# Patient Record
Sex: FEMALE | Race: WHITE | Employment: FULL TIME | URBAN - NONMETROPOLITAN AREA
[De-identification: names, ages, dates, MRNs, and addresses within clinical notes are randomized per-mention and may not be internally consistent; named-entity substitution may affect disease eponyms.]

---

## 2017-01-05 ENCOUNTER — OFFICE VISIT (OUTPATIENT)
Dept: FAMILY MEDICINE CLINIC | Age: 52
End: 2017-01-05
Payer: COMMERCIAL

## 2017-01-05 VITALS
BODY MASS INDEX: 36.32 KG/M2 | HEART RATE: 97 BPM | DIASTOLIC BLOOD PRESSURE: 76 MMHG | HEIGHT: 65 IN | SYSTOLIC BLOOD PRESSURE: 124 MMHG | WEIGHT: 218 LBS | TEMPERATURE: 97.6 F | OXYGEN SATURATION: 98 %

## 2017-01-05 DIAGNOSIS — J06.9 ACUTE URI: ICD-10-CM

## 2017-01-05 DIAGNOSIS — R30.0 DYSURIA: Primary | ICD-10-CM

## 2017-01-05 DIAGNOSIS — R30.0 DYSURIA: ICD-10-CM

## 2017-01-05 LAB
AMORPHOUS: ABNORMAL /HPF
BACTERIA: ABNORMAL /HPF
BILIRUBIN URINE: ABNORMAL
BLOOD, URINE: ABNORMAL
CLARITY: CLEAR
COLOR: ABNORMAL
GLUCOSE URINE: NEGATIVE MG/DL
KETONES, URINE: NEGATIVE MG/DL
LEUKOCYTE ESTERASE, URINE: NEGATIVE
NITRITE, URINE: NEGATIVE
PH UA: 5.5
PROTEIN UA: 30 MG/DL
SPECIFIC GRAVITY UA: >=1.03
URINE TYPE: ABNORMAL
UROBILINOGEN, URINE: 0.2 E.U./DL
WBC UA: ABNORMAL /HPF (ref 0–5)

## 2017-01-05 PROCEDURE — 96372 THER/PROPH/DIAG INJ SC/IM: CPT | Performed by: NURSE PRACTITIONER

## 2017-01-05 PROCEDURE — 99214 OFFICE O/P EST MOD 30 MIN: CPT | Performed by: NURSE PRACTITIONER

## 2017-01-05 RX ORDER — DEXAMETHASONE SODIUM PHOSPHATE 4 MG/ML
4 INJECTION, SOLUTION INTRA-ARTICULAR; INTRALESIONAL; INTRAMUSCULAR; INTRAVENOUS; SOFT TISSUE ONCE
Status: COMPLETED | OUTPATIENT
Start: 2017-01-05 | End: 2017-01-05

## 2017-01-05 RX ORDER — TRIAMCINOLONE ACETONIDE 40 MG/ML
40 INJECTION, SUSPENSION INTRA-ARTICULAR; INTRAMUSCULAR ONCE
Status: COMPLETED | OUTPATIENT
Start: 2017-01-05 | End: 2017-01-05

## 2017-01-05 RX ORDER — CEFTRIAXONE 1 G/1
1 INJECTION, POWDER, FOR SOLUTION INTRAMUSCULAR; INTRAVENOUS ONCE
Status: COMPLETED | OUTPATIENT
Start: 2017-01-05 | End: 2017-01-05

## 2017-01-05 RX ADMIN — CEFTRIAXONE 1 G: 1 INJECTION, POWDER, FOR SOLUTION INTRAMUSCULAR; INTRAVENOUS at 16:03

## 2017-01-05 RX ADMIN — TRIAMCINOLONE ACETONIDE 40 MG: 40 INJECTION, SUSPENSION INTRA-ARTICULAR; INTRAMUSCULAR at 16:04

## 2017-01-05 RX ADMIN — DEXAMETHASONE SODIUM PHOSPHATE 4 MG: 4 INJECTION, SOLUTION INTRA-ARTICULAR; INTRALESIONAL; INTRAMUSCULAR; INTRAVENOUS; SOFT TISSUE at 16:04

## 2017-01-05 ASSESSMENT — ENCOUNTER SYMPTOMS
SHORTNESS OF BREATH: 1
SINUS PRESSURE: 1
RHINORRHEA: 1
COUGH: 1

## 2017-07-27 RX ORDER — ESOMEPRAZOLE MAGNESIUM 40 MG/1
CAPSULE, DELAYED RELEASE ORAL
Qty: 30 CAPSULE | Refills: 0 | Status: SHIPPED | OUTPATIENT
Start: 2017-07-27 | End: 2017-09-04 | Stop reason: SDUPTHER

## 2017-09-05 RX ORDER — ESOMEPRAZOLE MAGNESIUM 40 MG/1
CAPSULE, DELAYED RELEASE ORAL
Qty: 30 CAPSULE | Refills: 0 | Status: SHIPPED | OUTPATIENT
Start: 2017-09-05 | End: 2017-12-01 | Stop reason: SDUPTHER

## 2017-10-22 ENCOUNTER — APPOINTMENT (OUTPATIENT)
Dept: GENERAL RADIOLOGY | Age: 52
DRG: 313 | End: 2017-10-22
Payer: COMMERCIAL

## 2017-10-22 ENCOUNTER — HOSPITAL ENCOUNTER (INPATIENT)
Age: 52
LOS: 1 days | Discharge: HOME | DRG: 313 | End: 2017-10-23
Attending: EMERGENCY MEDICINE | Admitting: INTERNAL MEDICINE
Payer: COMMERCIAL

## 2017-10-22 DIAGNOSIS — R07.89 CHEST DISCOMFORT: Primary | ICD-10-CM

## 2017-10-22 LAB
ALBUMIN SERPL-MCNC: 4.1 G/DL (ref 3.5–5.2)
ALP BLD-CCNC: 79 U/L (ref 35–104)
ALT SERPL-CCNC: 17 U/L (ref 5–33)
ANION GAP SERPL CALCULATED.3IONS-SCNC: 13 MMOL/L (ref 7–19)
AST SERPL-CCNC: 17 U/L (ref 5–32)
BASOPHILS ABSOLUTE: 0.1 K/UL (ref 0–0.2)
BASOPHILS RELATIVE PERCENT: 1.2 % (ref 0–1)
BILIRUB SERPL-MCNC: <0.2 MG/DL (ref 0.2–1.2)
BUN BLDV-MCNC: 13 MG/DL (ref 6–20)
C-REACTIVE PROTEIN: 0.49 MG/DL (ref 0–0.5)
CALCIUM SERPL-MCNC: 9.4 MG/DL (ref 8.6–10)
CHLORIDE BLD-SCNC: 102 MMOL/L (ref 98–111)
CO2: 25 MMOL/L (ref 22–29)
CREAT SERPL-MCNC: 1 MG/DL (ref 0.5–0.9)
EOSINOPHILS ABSOLUTE: 0.1 K/UL (ref 0–0.6)
EOSINOPHILS RELATIVE PERCENT: 2.1 % (ref 0–5)
GFR NON-AFRICAN AMERICAN: 59
GLUCOSE BLD-MCNC: 109 MG/DL (ref 74–109)
HCT VFR BLD CALC: 39 % (ref 37–47)
HEMOGLOBIN: 12.6 G/DL (ref 12–16)
LYMPHOCYTES ABSOLUTE: 2.3 K/UL (ref 1.1–4.5)
LYMPHOCYTES RELATIVE PERCENT: 40.1 % (ref 20–40)
MCH RBC QN AUTO: 29.9 PG (ref 27–31)
MCHC RBC AUTO-ENTMCNC: 32.3 G/DL (ref 33–37)
MCV RBC AUTO: 92.6 FL (ref 81–99)
MONOCYTES ABSOLUTE: 0.4 K/UL (ref 0–0.9)
MONOCYTES RELATIVE PERCENT: 7.3 % (ref 0–10)
NEUTROPHILS ABSOLUTE: 2.7 K/UL (ref 1.5–7.5)
NEUTROPHILS RELATIVE PERCENT: 48.9 % (ref 50–65)
PDW BLD-RTO: 13.6 % (ref 11.5–14.5)
PERFORMED ON: NORMAL
PERFORMED ON: NORMAL
PLATELET # BLD: 297 K/UL (ref 130–400)
PLATELET SLIDE REVIEW: ABNORMAL
PMV BLD AUTO: 10.5 FL (ref 9.4–12.3)
POC TROPONIN I: 0 NG/ML (ref 0–0.08)
POC TROPONIN I: 0 NG/ML (ref 0–0.08)
POTASSIUM SERPL-SCNC: 4.3 MMOL/L (ref 3.5–5)
RBC # BLD: 4.21 M/UL (ref 4.2–5.4)
SEDIMENTATION RATE, ERYTHROCYTE: 22 MM/HR (ref 0–25)
SODIUM BLD-SCNC: 140 MMOL/L (ref 136–145)
TOTAL PROTEIN: 7.2 G/DL (ref 6.6–8.7)
TROPONIN: <0.01 NG/ML (ref 0–0.03)
WBC # BLD: 5.6 K/UL (ref 4.8–10.8)

## 2017-10-22 PROCEDURE — 99223 1ST HOSP IP/OBS HIGH 75: CPT | Performed by: INTERNAL MEDICINE

## 2017-10-22 PROCEDURE — 85025 COMPLETE CBC W/AUTO DIFF WBC: CPT

## 2017-10-22 PROCEDURE — 86140 C-REACTIVE PROTEIN: CPT

## 2017-10-22 PROCEDURE — 36415 COLL VENOUS BLD VENIPUNCTURE: CPT

## 2017-10-22 PROCEDURE — 2140000000 HC CCU INTERMEDIATE R&B

## 2017-10-22 PROCEDURE — 99285 EMERGENCY DEPT VISIT HI MDM: CPT

## 2017-10-22 PROCEDURE — 6370000000 HC RX 637 (ALT 250 FOR IP): Performed by: EMERGENCY MEDICINE

## 2017-10-22 PROCEDURE — 85652 RBC SED RATE AUTOMATED: CPT

## 2017-10-22 PROCEDURE — 71010 XR CHEST PORTABLE: CPT

## 2017-10-22 PROCEDURE — 93005 ELECTROCARDIOGRAM TRACING: CPT

## 2017-10-22 PROCEDURE — 99284 EMERGENCY DEPT VISIT MOD MDM: CPT | Performed by: EMERGENCY MEDICINE

## 2017-10-22 PROCEDURE — 6370000000 HC RX 637 (ALT 250 FOR IP): Performed by: INTERNAL MEDICINE

## 2017-10-22 PROCEDURE — 6360000002 HC RX W HCPCS: Performed by: INTERNAL MEDICINE

## 2017-10-22 PROCEDURE — 80053 COMPREHEN METABOLIC PANEL: CPT

## 2017-10-22 PROCEDURE — 2580000003 HC RX 258: Performed by: INTERNAL MEDICINE

## 2017-10-22 PROCEDURE — 84484 ASSAY OF TROPONIN QUANT: CPT

## 2017-10-22 RX ORDER — LISINOPRIL AND HYDROCHLOROTHIAZIDE 12.5; 1 MG/1; MG/1
1 TABLET ORAL DAILY
Status: DISCONTINUED | OUTPATIENT
Start: 2017-10-23 | End: 2017-10-22 | Stop reason: CLARIF

## 2017-10-22 RX ORDER — ACETAMINOPHEN 325 MG/1
650 TABLET ORAL EVERY 4 HOURS PRN
Status: DISCONTINUED | OUTPATIENT
Start: 2017-10-22 | End: 2017-10-23 | Stop reason: HOSPADM

## 2017-10-22 RX ORDER — FAMOTIDINE 20 MG/1
40 TABLET, FILM COATED ORAL 2 TIMES DAILY
Status: DISCONTINUED | OUTPATIENT
Start: 2017-10-22 | End: 2017-10-23 | Stop reason: HOSPADM

## 2017-10-22 RX ORDER — SODIUM CHLORIDE 0.9 % (FLUSH) 0.9 %
10 SYRINGE (ML) INJECTION EVERY 12 HOURS SCHEDULED
Status: DISCONTINUED | OUTPATIENT
Start: 2017-10-22 | End: 2017-10-23 | Stop reason: HOSPADM

## 2017-10-22 RX ORDER — ASPIRIN 81 MG/1
81 TABLET, CHEWABLE ORAL DAILY
Status: DISCONTINUED | OUTPATIENT
Start: 2017-10-23 | End: 2017-10-23 | Stop reason: HOSPADM

## 2017-10-22 RX ORDER — LISINOPRIL 10 MG/1
10 TABLET ORAL DAILY
Status: DISCONTINUED | OUTPATIENT
Start: 2017-10-23 | End: 2017-10-23 | Stop reason: HOSPADM

## 2017-10-22 RX ORDER — SODIUM CHLORIDE 0.9 % (FLUSH) 0.9 %
10 SYRINGE (ML) INJECTION PRN
Status: DISCONTINUED | OUTPATIENT
Start: 2017-10-22 | End: 2017-10-23 | Stop reason: HOSPADM

## 2017-10-22 RX ORDER — HYDROCHLOROTHIAZIDE 12.5 MG/1
12.5 CAPSULE, GELATIN COATED ORAL DAILY
Status: DISCONTINUED | OUTPATIENT
Start: 2017-10-23 | End: 2017-10-23 | Stop reason: HOSPADM

## 2017-10-22 RX ORDER — PANTOPRAZOLE SODIUM 40 MG/1
40 TABLET, DELAYED RELEASE ORAL
Status: DISCONTINUED | OUTPATIENT
Start: 2017-10-23 | End: 2017-10-23 | Stop reason: HOSPADM

## 2017-10-22 RX ORDER — ONDANSETRON 2 MG/ML
4 INJECTION INTRAMUSCULAR; INTRAVENOUS EVERY 6 HOURS PRN
Status: DISCONTINUED | OUTPATIENT
Start: 2017-10-22 | End: 2017-10-23 | Stop reason: HOSPADM

## 2017-10-22 RX ORDER — ASPIRIN 81 MG/1
324 TABLET, CHEWABLE ORAL ONCE
Status: COMPLETED | OUTPATIENT
Start: 2017-10-22 | End: 2017-10-22

## 2017-10-22 RX ORDER — NITROGLYCERIN 0.4 MG/1
0.4 TABLET SUBLINGUAL EVERY 5 MIN PRN
Status: DISCONTINUED | OUTPATIENT
Start: 2017-10-22 | End: 2017-10-23 | Stop reason: HOSPADM

## 2017-10-22 RX ORDER — ESOMEPRAZOLE MAGNESIUM 40 MG/1
40 CAPSULE, DELAYED RELEASE ORAL
Status: DISCONTINUED | OUTPATIENT
Start: 2017-10-23 | End: 2017-10-22 | Stop reason: CLARIF

## 2017-10-22 RX ORDER — BUTALBITAL, ACETAMINOPHEN AND CAFFEINE 50; 325; 40 MG/1; MG/1; MG/1
1 TABLET ORAL EVERY 4 HOURS PRN
Status: DISCONTINUED | OUTPATIENT
Start: 2017-10-22 | End: 2017-10-23 | Stop reason: HOSPADM

## 2017-10-22 RX ADMIN — ENOXAPARIN SODIUM 40 MG: 40 INJECTION SUBCUTANEOUS at 21:20

## 2017-10-22 RX ADMIN — ACETAMINOPHEN 650 MG: 325 TABLET, FILM COATED ORAL at 21:20

## 2017-10-22 RX ADMIN — NITROGLYCERIN 0.4 MG: 0.4 TABLET SUBLINGUAL at 18:25

## 2017-10-22 RX ADMIN — Medication 10 ML: at 21:20

## 2017-10-22 RX ADMIN — NITROGLYCERIN 0.4 MG: 0.4 TABLET SUBLINGUAL at 18:15

## 2017-10-22 RX ADMIN — ASPIRIN 81 MG CHEWABLE TABLET 324 MG: 81 TABLET CHEWABLE at 18:15

## 2017-10-22 RX ADMIN — FAMOTIDINE 40 MG: 20 TABLET ORAL at 21:52

## 2017-10-22 ASSESSMENT — PAIN DESCRIPTION - PAIN TYPE: TYPE: ACUTE PAIN

## 2017-10-22 ASSESSMENT — ENCOUNTER SYMPTOMS
BACK PAIN: 1
COUGH: 0
VOMITING: 0
NAUSEA: 1
SHORTNESS OF BREATH: 1

## 2017-10-22 ASSESSMENT — PAIN SCALES - GENERAL
PAINLEVEL_OUTOF10: 2
PAINLEVEL_OUTOF10: 7
PAINLEVEL_OUTOF10: 7

## 2017-10-22 ASSESSMENT — PAIN DESCRIPTION - ORIENTATION: ORIENTATION: LEFT

## 2017-10-22 ASSESSMENT — PAIN DESCRIPTION - DESCRIPTORS: DESCRIPTORS: SHARP;PRESSURE

## 2017-10-22 ASSESSMENT — PAIN DESCRIPTION - LOCATION: LOCATION: CHEST

## 2017-10-22 ASSESSMENT — PAIN DESCRIPTION - FREQUENCY: FREQUENCY: INTERMITTENT

## 2017-10-22 ASSESSMENT — PAIN DESCRIPTION - DIRECTION: RADIATING_TOWARDS: THROUGH TO BACK

## 2017-10-22 NOTE — ED PROVIDER NOTES
children: N/A    Years of education: N/A     Social History Main Topics    Smoking status: Never Smoker    Smokeless tobacco: None    Alcohol use None    Drug use: Unknown    Sexual activity: Not Asked     Other Topics Concern    None     Social History Narrative    None       SCREENINGS    [place] Coma Scale  Eye Opening: Spontaneous  Best Verbal Response: Oriented  Best Motor Response: Obeys commands  Abdirahman Coma Scale Score: 15        PHYSICAL EXAM    (up to 7 for level 4, 8 or more for level 5)     ED Triage Vitals [10/22/17 1614]   BP Temp Temp Source Pulse Resp SpO2 Height Weight   (!) 177/101 98.1 °F (36.7 °C) Tympanic 104 22 100 % 5' 5\" (1.651 m) 220 lb (99.8 kg)       Physical Exam   Constitutional: She is oriented to person, place, and time. She appears well-developed and well-nourished. No distress. HENT:   Mouth/Throat: Oropharynx is clear and moist.   Neck: Normal range of motion. Neck supple. Cardiovascular: Normal rate, regular rhythm and normal heart sounds. Pulmonary/Chest: Effort normal and breath sounds normal.   Abdominal: Soft. There is no tenderness. Musculoskeletal: She exhibits no edema. Neurological: She is alert and oriented to person, place, and time. Skin: Skin is warm and dry. She is not diaphoretic. Psychiatric: She has a normal mood and affect. Nursing note and vitals reviewed.       DIAGNOSTIC RESULTS     EKG: All EKG's are interpreted by the Emergency Department Physician who either signs or Co-signs this chart in the absence of a cardiologist.    EKG 1: sinus tach, , LVH changes, no ST elevation or depression  EKG 2: no sig change except not tachy    RADIOLOGY:   Non-plain film images such as CT, Ultrasound and MRI are read by the radiologist. Plain radiographic images are visualized and preliminarily interpreted by the emergency physician with the below findings:        Interpretation per the Radiologist below, if available at the time of this MDM  Number of Diagnoses or Management Options  Diagnosis management comments: Improved after NTG. Discussed with Dr. Bhupendra landin in ED. Admits. CRITICAL CARE TIME   Total Critical Care time was 0 minutes, excluding separately reportable procedures. There was a high probability of clinically significant/life threatening deterioration in the patient's condition which required my urgent intervention. CONSULTS:  None    PROCEDURES:  Unless otherwise noted below, none     Procedures    FINAL IMPRESSION      1.  Chest discomfort          DISPOSITION/PLAN   DISPOSITION     PATIENT REFERRED TO:  Lesley San MD  [address] [place] [address]  [phone]            DISCHARGE MEDICATIONS:  Current Discharge Medication List             (Please note that portions of this note were completed with a voice recognition program.  Efforts were made to edit the dictations but occasionally words are mis-transcribed.)    Marilou Reza MD (electronically signed)  Attending Emergency Physician        Marilou Reza MD  10/23/17 [address] [place] MD  10/23/17 2535

## 2017-10-22 NOTE — ED NOTES
Repeat troponin running. Patient resting quietly with no s/s of distress.      [address] [facility]  10/22/17 1271

## 2017-10-23 ENCOUNTER — APPOINTMENT (OUTPATIENT)
Dept: NUCLEAR MEDICINE | Age: 52
DRG: 313 | End: 2017-10-23
Payer: COMMERCIAL

## 2017-10-23 VITALS
DIASTOLIC BLOOD PRESSURE: 87 MMHG | SYSTOLIC BLOOD PRESSURE: 155 MMHG | BODY MASS INDEX: 37.75 KG/M2 | RESPIRATION RATE: 16 BRPM | OXYGEN SATURATION: 99 % | HEIGHT: 65 IN | HEART RATE: 72 BPM | WEIGHT: 226.6 LBS | TEMPERATURE: 97.4 F

## 2017-10-23 LAB
ALBUMIN SERPL-MCNC: 3.4 G/DL (ref 3.5–5.2)
ALP BLD-CCNC: 63 U/L (ref 35–104)
ALT SERPL-CCNC: 13 U/L (ref 5–33)
ANION GAP SERPL CALCULATED.3IONS-SCNC: 11 MMOL/L (ref 7–19)
AST SERPL-CCNC: 13 U/L (ref 5–32)
BASOPHILS ABSOLUTE: 0.1 K/UL (ref 0–0.2)
BASOPHILS RELATIVE PERCENT: 1.1 % (ref 0–1)
BILIRUB SERPL-MCNC: <0.2 MG/DL (ref 0.2–1.2)
BUN BLDV-MCNC: 13 MG/DL (ref 6–20)
CALCIUM SERPL-MCNC: 8.9 MG/DL (ref 8.6–10)
CHLORIDE BLD-SCNC: 104 MMOL/L (ref 98–111)
CHOLESTEROL, TOTAL: 227 MG/DL (ref 160–199)
CO2: 27 MMOL/L (ref 22–29)
CREAT SERPL-MCNC: 1 MG/DL (ref 0.5–0.9)
EOSINOPHILS ABSOLUTE: 0.2 K/UL (ref 0–0.6)
EOSINOPHILS RELATIVE PERCENT: 2.1 % (ref 0–5)
GFR NON-AFRICAN AMERICAN: 59
GLUCOSE BLD-MCNC: 100 MG/DL (ref 74–109)
HCT VFR BLD CALC: 34.8 % (ref 37–47)
HDLC SERPL-MCNC: 42 MG/DL (ref 65–121)
HEMOGLOBIN: 11.5 G/DL (ref 12–16)
INR BLD: 1.07 (ref 0.88–1.18)
LDL CHOLESTEROL CALCULATED: 118 MG/DL
LV EF: 66 %
LVEF MODALITY: NORMAL
LYMPHOCYTES ABSOLUTE: 3.2 K/UL (ref 1.1–4.5)
LYMPHOCYTES RELATIVE PERCENT: 45.5 % (ref 20–40)
MCH RBC QN AUTO: 30.2 PG (ref 27–31)
MCHC RBC AUTO-ENTMCNC: 33 G/DL (ref 33–37)
MCV RBC AUTO: 91.3 FL (ref 81–99)
MONOCYTES ABSOLUTE: 0.7 K/UL (ref 0–0.9)
MONOCYTES RELATIVE PERCENT: 9.9 % (ref 0–10)
NEUTROPHILS ABSOLUTE: 2.9 K/UL (ref 1.5–7.5)
NEUTROPHILS RELATIVE PERCENT: 41.1 % (ref 50–65)
PDW BLD-RTO: 13.7 % (ref 11.5–14.5)
PLATELET # BLD: 256 K/UL (ref 130–400)
PMV BLD AUTO: 9.4 FL (ref 9.4–12.3)
POTASSIUM SERPL-SCNC: 4.1 MMOL/L (ref 3.5–5)
PROTHROMBIN TIME: 13.8 SEC (ref 12–14.6)
RBC # BLD: 3.81 M/UL (ref 4.2–5.4)
SODIUM BLD-SCNC: 142 MMOL/L (ref 136–145)
TOTAL PROTEIN: 6.5 G/DL (ref 6.6–8.7)
TRIGL SERPL-MCNC: 333 MG/DL (ref 0–149)
TROPONIN: <0.01 NG/ML (ref 0–0.03)
TROPONIN: <0.01 NG/ML (ref 0–0.03)
WBC # BLD: 7.1 K/UL (ref 4.8–10.8)

## 2017-10-23 PROCEDURE — 3430000000 HC RX DIAGNOSTIC RADIOPHARMACEUTICAL: Performed by: INTERNAL MEDICINE

## 2017-10-23 PROCEDURE — 78452 HT MUSCLE IMAGE SPECT MULT: CPT

## 2017-10-23 PROCEDURE — 85025 COMPLETE CBC W/AUTO DIFF WBC: CPT

## 2017-10-23 PROCEDURE — 6360000002 HC RX W HCPCS: Performed by: INTERNAL MEDICINE

## 2017-10-23 PROCEDURE — 85610 PROTHROMBIN TIME: CPT

## 2017-10-23 PROCEDURE — 84484 ASSAY OF TROPONIN QUANT: CPT

## 2017-10-23 PROCEDURE — 36415 COLL VENOUS BLD VENIPUNCTURE: CPT

## 2017-10-23 PROCEDURE — A9500 TC99M SESTAMIBI: HCPCS | Performed by: INTERNAL MEDICINE

## 2017-10-23 PROCEDURE — 99238 HOSP IP/OBS DSCHRG MGMT 30/<: CPT | Performed by: INTERNAL MEDICINE

## 2017-10-23 PROCEDURE — 80061 LIPID PANEL: CPT

## 2017-10-23 PROCEDURE — 93017 CV STRESS TEST TRACING ONLY: CPT

## 2017-10-23 PROCEDURE — 2580000003 HC RX 258: Performed by: INTERNAL MEDICINE

## 2017-10-23 PROCEDURE — 80053 COMPREHEN METABOLIC PANEL: CPT

## 2017-10-23 PROCEDURE — 6370000000 HC RX 637 (ALT 250 FOR IP): Performed by: INTERNAL MEDICINE

## 2017-10-23 RX ADMIN — FAMOTIDINE 40 MG: 20 TABLET ORAL at 10:24

## 2017-10-23 RX ADMIN — Medication 10 ML: at 10:25

## 2017-10-23 RX ADMIN — HYDROCHLOROTHIAZIDE 12.5 MG: 12.5 CAPSULE ORAL at 10:32

## 2017-10-23 RX ADMIN — REGADENOSON 0.4 MG: 0.08 INJECTION, SOLUTION INTRAVENOUS at 07:00

## 2017-10-23 RX ADMIN — TETRAKIS(2-METHOXYISOBUTYLISOCYANIDE)COPPER(I) TETRAFLUOROBORATE 30 MILLICURIE: 1 INJECTION, POWDER, LYOPHILIZED, FOR SOLUTION INTRAVENOUS at 10:18

## 2017-10-23 RX ADMIN — TETRAKIS(2-METHOXYISOBUTYLISOCYANIDE)COPPER(I) TETRAFLUOROBORATE 10 MILLICURIE: 1 INJECTION, POWDER, LYOPHILIZED, FOR SOLUTION INTRAVENOUS at 10:18

## 2017-10-23 RX ADMIN — ASPIRIN 81 MG CHEWABLE TABLET 81 MG: 81 TABLET CHEWABLE at 10:25

## 2017-10-23 RX ADMIN — ACETAMINOPHEN 650 MG: 325 TABLET, FILM COATED ORAL at 10:25

## 2017-10-23 RX ADMIN — LISINOPRIL 10 MG: 10 TABLET ORAL at 10:25

## 2017-10-23 ASSESSMENT — PAIN SCALES - GENERAL: PAINLEVEL_OUTOF10: 5

## 2017-10-23 NOTE — DISCHARGE SUMMARY
[facility]    Discharge Summary        Patient ID: Ventura Mir      Patient's PCP: Chau Cannon MD    Admit Date: 10/22/2017     Discharge Date:  10/23/17     Admitting Physician:  Edy Burden MD      Discharge Physician: Edy Burdne MD     Discharge Diagnoses:    1. Chest discomfort of ? Etiology, doubt myocardial ischemia    lexiscan / cardiolyte, 10/23/17  negative for myocardial ischemia, EF 66  %    2. Coronary artery disease    10/14/2014  SE Positive for clinical myocardial ischemia  10/14/2017  Cath  Moderate diffuse disease in mid and distal LAD, normal LVFX  10/23/17 lexiscan negative for myocardial ischemia, EF 66  %     3. Pereyra esophagus      Cardiac Specific Diagnoses:    Specialty Problems     None            The patient was seen and examined on day of discharge and this discharge summary is in conjunction with any daily progress note from day of discharge. History of Present Illness:     Ventura Mir is a 50 y.o. female who presents to [facility] ED with symptoms / signs / problem or diagnosis of chest discomfort. It is sharp and has been going on since Friday. The symptoms began several days ago. The chest discomfort began at rest and lasted less than a minute. It was not worse with activity. There was not partial relief with rest.  The chest discomfort was not described as pressure, fullness and tightness. It was not crushing. It was it located in the central chest with radiation to the back, throat and left shoulder. It was described as mild. There were no associated manifestations such as nausea, vomiting, diaphoresis, presyncope, syncope, dizzyness, weakness, cold sweats, or shortness of air. Hospital Course:     After admission, there was no enzymatic or electrocardiographic evidence of myocardial ischemia.       The patient had a [place] Kemi / cardiolyte, 10/23/17   which was grossly negative for myocardial ischemia by imaging criteria. This was a most reassuring report and strongly suggested that the patient's chest discomfort was of low probably of representing myocardial ischemia. The rest of the patient's hospitalization was uneventful. She was discharged home on medical therapy with outpatient follow up. Consults:     None      Significant Diagnostic Studies:    1. lexiscan / cardiolyte, 10/23/17    2. Significant Therapeutic Endeavors:      1. none      Activity: activity as directed per discharge instructions. Diet:  Cardiac diet    Labs:  For convenience and continuity at follow-up the following most recent labs are provided:    CBC:    Lab Results   Component Value Date    WBC 7.1 10/23/2017    HGB 11.5 10/23/2017    HCT 34.8 10/23/2017     10/23/2017       Renal:    Lab Results   Component Value Date     10/23/2017    K 4.1 10/23/2017     10/23/2017    CO2 27 10/23/2017    BUN 13 10/23/2017    CREATININE 1.0 10/23/2017    CALCIUM 8.9 10/23/2017         Discharge Medications:     Current Discharge Medication List           Details   esomeprazole (NEXIUM) 40 MG delayed release capsule TAKE ONE CAPSULE BY MOUTH EVERY MORNING BEFORE BREAKFAST  Qty: 30 capsule, Refills: 0      CPAP Machine MISC by Does not apply route  Qty: 1 each, Refills: 0      lisinopril-hydrochlorothiazide (PRINZIDE;ZESTORETIC) 10-12.5 MG per tablet Take 1 tablet by mouth daily  Qty: 30 tablet, Refills: 5    Associated Diagnoses: Essential hypertension      butalbital-acetaminophen-caffeine (FIORICET, ESGIC) -40 MG per tablet Take 1 tablet by mouth every 4 hours as needed for Headaches  Qty: 60 tablet, Refills: 0    Associated Diagnoses: History of headache      ranitidine (ZANTAC) 300 MG tablet Take 300 mg by mouth 2 times daily      Cetirizine HCl (ZYRTEC ALLERGY) 10 MG CAPS Take 1 capsule by mouth daily  Qty: 30 capsule, Refills: 0    Associated Diagnoses: Acute URI      celecoxib (CELEBREX) 200 MG capsule Take 1

## 2017-10-23 NOTE — H&P
Surgical History:   Procedure Laterality Date    CARDIAC CATHETERIZATION  10/14/13  JDT    EF 50%    CHOLECYSTECTOMY      HYSTERECTOMY      TONSILLECTOMY           Home Medications:   Prior to Admission medications    Medication Sig Start Date End Date Taking? Authorizing Provider   esomeprazole (NEXIUM) 40 MG delayed release capsule TAKE ONE CAPSULE BY MOUTH EVERY MORNING BEFORE BREAKFAST 9/5/17   MICHELE Laura   Cetirizine HCl (ZYRTEC ALLERGY) 10 MG CAPS Take 1 capsule by mouth daily 1/5/17   MICHELE Lerner   CPAP Machine MISC by Does not apply route 10/1/16   MICHELE Bello   celecoxib (CELEBREX) 200 MG capsule Take 1 capsule by mouth daily 9/27/16   MICHELE Bello   lisinopril-hydrochlorothiazide (PRINZIDE;ZESTORETIC) 10-12.5 MG per tablet Take 1 tablet by mouth daily 9/1/16   MICHELE Bello   butalbital-acetaminophen-caffeine (FIORICET, ESGIC) -04 MG per tablet Take 1 tablet by mouth every 4 hours as needed for Headaches 9/1/16   MICHELE Bello   estradiol (ESTRACE) 1 MG tablet Take 1 tablet by mouth daily 9/1/16   MICHELE Bello   tiZANidine (ZANAFLEX) 4 MG tablet Take 1 tablet by mouth every 8 hours as needed (muscle spasms) 9/1/16   MICHELE Bello   ranitidine (ZANTAC) 300 MG tablet Take 300 mg by mouth 2 times daily    Historical Provider, MD        Facility Administered Medications: Allergies:  Morphine     Social History:       Social History     Social History    Marital status:      Spouse name: N/A    Number of children: N/A    Years of education: N/A     Occupational History    Not on file. Social History Main Topics    Smoking status: Never Smoker    Smokeless tobacco: Not on file    Alcohol use Not on file    Drug use: Unknown    Sexual activity: Not on file     Other Topics Concern    Not on file     Social History Narrative    No narrative on file       Family History:   History reviewed.  No pertinent family history. REVIEW OF SYSTEMS:     Except as noted in the HPI, all other systems are negative        PHYSICAL EXAMINATION:     /66   Pulse 75   Temp 98.1 °F (36.7 °C) (Tympanic)   Resp 22   Ht 5' 5\" (1.651 m)   Wt 220 lb (99.8 kg)   SpO2 93%   BMI 36.61 kg/m²     GENERAL - well developed and well nourished, in no amount of generalized distress  HEENT   PERRLA, Hearing appears normal, conjunctiva and lids are normal, ears and nose appear normal  NECK - no thyromegaly, no JVD, trachea is in the midline  CARDIOVASCULAR  PMI is in the left mid line clavicular position, Normal S1 and S2 with a grade 1/6 systolic murmur. No S3 or S4    PULMONARY   no respiratory distress. scattered wheezes and rales. ABDOMEN   soft, non tender, no rebound, no hepatomegaly or splenomegaly  MUSCULOSKELETAL   Prone/Supine, digitals and nails are without clubbing or cyanosis  EXTREMITIES - trace edema  NEUROLOGIC - cranial nerves, II-XII, are normal  SKIN - turgor is normal, no rash  PSYCHIATRIC - normal mood and affect, alert and orientated x 3, judgement and insight appear appropriate      LABORATORY EVALUATION & TESTING:    I have personally reviewed and interpreted the results of the following diagnostic testing      EKG and or Telemetry:  which was personally reviewed me:  Sinus rhythm,   Pulse Readings from Last 1 Encounters:   10/22/17 75    bpm,  without Acute changes    Troponin:  negative myocardial necrosis (  0)    CBC:   Recent Labs      10/22/17   1644   WBC  5.6   HGB  12.6   HCT  39.0   MCV  92.6   PLT  297     BMP:   Recent Labs      10/22/17   1644   NA  140   K  4.3   CL  102   CO2  25   BUN  13   CREATININE  1.0*     Cardiac Enzymes:   Recent Labs      10/22/17   1648  10/22/17   1850   TROPONINI  0.00  0.00     PT/INR: No results for input(s): PROTIME, INR in the last 72 hours. APTT: No results for input(s): APTT in the last 72 hours.   Liver Profile:  Lab Results   Component Value Date    AST 17 10/22/2017    ALT 17 10/22/2017    BILITOT <0.2 10/22/2017    ALKPHOS 79 10/22/2017   No results found for: CHOL, HDL, TRIG  TSH:  Lab Results   Component Value Date    TSH 1.28 09/01/2016     UA:   Lab Results   Component Value Date    COLORU RONALDO 01/05/2017    PHUR 5.5 01/05/2017    WBCUA 0-1 01/05/2017    RBCUA 9 09/01/2016    MUCUS 1+ 11/30/2014    BACTERIA 2+ 01/05/2017    CLARITYU Clear 01/05/2017    SPECGRAV >=1.030 01/05/2017    LEUKOCYTESUR Negative 01/05/2017    UROBILINOGEN 0.2 01/05/2017    BILIRUBINUR SMALL 01/05/2017    BLOODU MODERATE 01/05/2017    GLUCOSEU Negative 01/05/2017    AMORPHOUS 1+ 01/05/2017             ALL THE CARDIOLOGY PROBLEMS ARE LISTED ABOVE; HOWEVER, THE FOLLOWING SPECIFIC CARDIAC PROBLEMS WERE ADDRESSED AND TREATED DURING THE HOSPITAL VISIT TODAY:                                                                                                                                                                                                                                              MEDICAL DECISION MAKING             Cardiac Specific Problem / Diagnosis  Discussion and Data Reviewed Diagnostic Procedures Ordered Management Options Selected           1. Chest discomfort of ? etiology  are worsening   The chest discomfort is was not exertional and does not appear to represent myocardial ischemia. Nonetheless, She has the following risk factors for the presence of coronary artery disease:    Risk Factor Yes / No / Unknown       Gender Female   Cigarette Use No   Family History of Cardiovascular Disease N/A   Diabetes Mellitus no   Hypercholesteremia no   Hypertension no         She also has the following cardiac history:    Specialty Problems     None              Yes:     lexiscan / cardiolyte, tomorrow  Continue current medications:     yes           2.  CAD Initial presentation during this evaluation   Review and summation of old records:    10/14/2014  SE Positive for clinical

## 2017-10-23 NOTE — PLAN OF CARE
Problem: Pain:  Goal: Pain level will decrease  Pain level will decrease  Outcome: Met This Shift    Goal: Control of acute pain  Control of acute pain  Outcome: Met This Shift    Goal: Control of chronic pain  Control of chronic pain  Outcome: Met This Shift

## 2017-10-24 LAB
EKG P AXIS: 21 DEGREES
EKG P AXIS: 34 DEGREES
EKG P-R INTERVAL: 156 MS
EKG P-R INTERVAL: 158 MS
EKG Q-T INTERVAL: 334 MS
EKG Q-T INTERVAL: 406 MS
EKG QRS DURATION: 86 MS
EKG QRS DURATION: 94 MS
EKG QTC CALCULATION (BAZETT): 406 MS
EKG QTC CALCULATION (BAZETT): 409 MS
EKG T AXIS: 2 DEGREES
EKG T AXIS: 3 DEGREES

## 2017-11-14 DIAGNOSIS — N63.0 BREAST MASS IN FEMALE: Primary | ICD-10-CM

## 2017-12-01 RX ORDER — ESOMEPRAZOLE MAGNESIUM 40 MG/1
CAPSULE, DELAYED RELEASE ORAL
Qty: 30 CAPSULE | Refills: 0 | Status: SHIPPED | OUTPATIENT
Start: 2017-12-01 | End: 2018-02-23 | Stop reason: SDUPTHER

## 2018-02-23 ENCOUNTER — OFFICE VISIT (OUTPATIENT)
Dept: FAMILY MEDICINE CLINIC | Age: 53
End: 2018-02-23
Payer: COMMERCIAL

## 2018-02-23 VITALS
OXYGEN SATURATION: 98 % | DIASTOLIC BLOOD PRESSURE: 82 MMHG | TEMPERATURE: 98.9 F | BODY MASS INDEX: 38.61 KG/M2 | HEART RATE: 101 BPM | SYSTOLIC BLOOD PRESSURE: 134 MMHG | WEIGHT: 232 LBS | RESPIRATION RATE: 16 BRPM

## 2018-02-23 DIAGNOSIS — R10.11 RUQ PAIN: ICD-10-CM

## 2018-02-23 DIAGNOSIS — R10.11 RUQ PAIN: Primary | ICD-10-CM

## 2018-02-23 DIAGNOSIS — M54.2 NECK PAIN: ICD-10-CM

## 2018-02-23 DIAGNOSIS — Z87.898 HISTORY OF HEADACHE: ICD-10-CM

## 2018-02-23 DIAGNOSIS — Z12.31 SCREENING MAMMOGRAM, ENCOUNTER FOR: ICD-10-CM

## 2018-02-23 DIAGNOSIS — F41.8 SITUATIONAL ANXIETY: ICD-10-CM

## 2018-02-23 DIAGNOSIS — I10 ESSENTIAL HYPERTENSION: ICD-10-CM

## 2018-02-23 LAB
ALBUMIN SERPL-MCNC: 4.4 G/DL (ref 3.5–5.2)
ALP BLD-CCNC: 78 U/L (ref 35–104)
ALT SERPL-CCNC: 24 U/L (ref 5–33)
AMYLASE: 36 U/L (ref 28–100)
ANION GAP SERPL CALCULATED.3IONS-SCNC: 15 MMOL/L (ref 7–19)
AST SERPL-CCNC: 19 U/L (ref 5–32)
BASOPHILS ABSOLUTE: 0.1 K/UL (ref 0–0.2)
BASOPHILS RELATIVE PERCENT: 1 % (ref 0–1)
BILIRUB SERPL-MCNC: 0.4 MG/DL (ref 0.2–1.2)
BUN BLDV-MCNC: 13 MG/DL (ref 6–20)
CALCIUM SERPL-MCNC: 9.6 MG/DL (ref 8.6–10)
CHLORIDE BLD-SCNC: 98 MMOL/L (ref 98–111)
CO2: 26 MMOL/L (ref 22–29)
CREAT SERPL-MCNC: 0.9 MG/DL (ref 0.5–0.9)
EOSINOPHILS ABSOLUTE: 0.1 K/UL (ref 0–0.6)
EOSINOPHILS RELATIVE PERCENT: 1.3 % (ref 0–5)
GFR NON-AFRICAN AMERICAN: >60
GLUCOSE BLD-MCNC: 105 MG/DL (ref 74–109)
HCT VFR BLD CALC: 39.5 % (ref 37–47)
HEMOGLOBIN: 12.7 G/DL (ref 12–16)
LIPASE: 21 U/L (ref 13–60)
LYMPHOCYTES ABSOLUTE: 2.2 K/UL (ref 1.1–4.5)
LYMPHOCYTES RELATIVE PERCENT: 33.3 % (ref 20–40)
MCH RBC QN AUTO: 29.8 PG (ref 27–31)
MCHC RBC AUTO-ENTMCNC: 32.2 G/DL (ref 33–37)
MCV RBC AUTO: 92.7 FL (ref 81–99)
MONOCYTES ABSOLUTE: 0.6 K/UL (ref 0–0.9)
MONOCYTES RELATIVE PERCENT: 8.2 % (ref 0–10)
NEUTROPHILS ABSOLUTE: 3.7 K/UL (ref 1.5–7.5)
NEUTROPHILS RELATIVE PERCENT: 55.9 % (ref 50–65)
PDW BLD-RTO: 14 % (ref 11.5–14.5)
PLATELET # BLD: 323 K/UL (ref 130–400)
PMV BLD AUTO: 9.6 FL (ref 9.4–12.3)
POTASSIUM SERPL-SCNC: 3.6 MMOL/L (ref 3.5–5)
RBC # BLD: 4.26 M/UL (ref 4.2–5.4)
SODIUM BLD-SCNC: 139 MMOL/L (ref 136–145)
TOTAL PROTEIN: 7.7 G/DL (ref 6.6–8.7)
TSH SERPL DL<=0.05 MIU/L-ACNC: 1.26 UIU/ML (ref 0.27–4.2)
WBC # BLD: 6.7 K/UL (ref 4.8–10.8)

## 2018-02-23 PROCEDURE — 99214 OFFICE O/P EST MOD 30 MIN: CPT | Performed by: NURSE PRACTITIONER

## 2018-02-23 RX ORDER — CELECOXIB 200 MG/1
200 CAPSULE ORAL DAILY
Qty: 30 CAPSULE | Refills: 3 | Status: SHIPPED | OUTPATIENT
Start: 2018-02-23 | End: 2018-07-08 | Stop reason: SDUPTHER

## 2018-02-23 RX ORDER — DICYCLOMINE HCL 20 MG
20 TABLET ORAL 3 TIMES DAILY PRN
Qty: 120 TABLET | Refills: 0 | Status: SHIPPED | OUTPATIENT
Start: 2018-02-23 | End: 2018-10-26

## 2018-02-23 RX ORDER — ESOMEPRAZOLE MAGNESIUM 40 MG/1
CAPSULE, DELAYED RELEASE ORAL
Qty: 30 CAPSULE | Refills: 5 | Status: SHIPPED | OUTPATIENT
Start: 2018-02-23 | End: 2018-09-08 | Stop reason: SDUPTHER

## 2018-02-23 RX ORDER — LISINOPRIL AND HYDROCHLOROTHIAZIDE 12.5; 1 MG/1; MG/1
1 TABLET ORAL DAILY
Qty: 30 TABLET | Refills: 5 | Status: SHIPPED | OUTPATIENT
Start: 2018-02-23 | End: 2018-10-26 | Stop reason: SDUPTHER

## 2018-02-23 RX ORDER — BUTALBITAL, ACETAMINOPHEN AND CAFFEINE 50; 325; 40 MG/1; MG/1; MG/1
1 TABLET ORAL EVERY 4 HOURS PRN
Qty: 60 TABLET | Refills: 0 | Status: SHIPPED | OUTPATIENT
Start: 2018-02-23 | End: 2018-10-26 | Stop reason: SDUPTHER

## 2018-02-23 RX ORDER — BUSPIRONE HYDROCHLORIDE 10 MG/1
TABLET ORAL
Qty: 60 TABLET | Refills: 1 | Status: SHIPPED | OUTPATIENT
Start: 2018-02-23 | End: 2018-03-29 | Stop reason: SDUPTHER

## 2018-02-23 RX ORDER — HYDROCHLOROTHIAZIDE 12.5 MG/1
CAPSULE, GELATIN COATED ORAL
Qty: 30 CAPSULE | Refills: 5 | Status: SHIPPED | OUTPATIENT
Start: 2018-02-23 | End: 2018-10-26 | Stop reason: SDUPTHER

## 2018-02-23 ASSESSMENT — ENCOUNTER SYMPTOMS
NAUSEA: 0
VOMITING: 0
ABDOMINAL DISTENTION: 1
ABDOMINAL PAIN: 1
SHORTNESS OF BREATH: 0

## 2018-02-26 ENCOUNTER — HOSPITAL ENCOUNTER (OUTPATIENT)
Dept: GENERAL RADIOLOGY | Age: 53
Discharge: HOME | End: 2018-02-26
Payer: COMMERCIAL

## 2018-02-26 DIAGNOSIS — R10.11 RUQ PAIN: ICD-10-CM

## 2018-02-26 PROCEDURE — 2500000003 HC RX 250 WO HCPCS: Performed by: NURSE PRACTITIONER

## 2018-02-26 PROCEDURE — 74176 CT ABD & PELVIS W/O CONTRAST: CPT

## 2018-02-26 RX ADMIN — BARIUM SULFATE 450 ML: 21 SUSPENSION ORAL at 09:12

## 2018-02-27 ENCOUNTER — OFFICE VISIT (OUTPATIENT)
Dept: FAMILY MEDICINE CLINIC | Age: 53
End: 2018-02-27
Payer: COMMERCIAL

## 2018-02-27 VITALS
WEIGHT: 232 LBS | OXYGEN SATURATION: 98 % | DIASTOLIC BLOOD PRESSURE: 84 MMHG | RESPIRATION RATE: 16 BRPM | TEMPERATURE: 98.1 F | BODY MASS INDEX: 38.61 KG/M2 | SYSTOLIC BLOOD PRESSURE: 118 MMHG | HEART RATE: 95 BPM

## 2018-02-27 DIAGNOSIS — K59.01 SLOW TRANSIT CONSTIPATION: Primary | ICD-10-CM

## 2018-02-27 DIAGNOSIS — G47.30 SLEEP APNEA, UNSPECIFIED TYPE: ICD-10-CM

## 2018-02-27 PROCEDURE — 99213 OFFICE O/P EST LOW 20 MIN: CPT | Performed by: NURSE PRACTITIONER

## 2018-02-27 RX ORDER — POLYETHYLENE GLYCOL 3350 17 G/17G
17 POWDER, FOR SOLUTION ORAL DAILY
Qty: 510 G | Refills: 0 | Status: SHIPPED | OUTPATIENT
Start: 2018-02-27 | End: 2018-03-29

## 2018-02-27 NOTE — PROGRESS NOTES
corrected for weight) is  valid for stable renal function.  Calcium 02/23/2018 9.6  8.6 - 10.0 mg/dL Final    Total Protein 02/23/2018 7.7  6.6 - 8.7 g/dL Final    Alb 02/23/2018 4.4  3.5 - 5.2 g/dL Final    Total Bilirubin 02/23/2018 0.4  0.2 - 1.2 mg/dL Final    Alkaline Phosphatase 02/23/2018 78  35 - 104 U/L Final    ALT 02/23/2018 24  5 - 33 U/L Final    AST 02/23/2018 19  5 - 32 U/L Final    TSH 02/23/2018 1.260  0.270 - 4.200 uIU/mL Final    Amylase 02/23/2018 36  28 - 100 U/L Final    Lipase 02/23/2018 21  13 - 60 U/L Final   Lab has been reviewed in detail with pt today also. No fevers reported. Pt has been at work today. Pt has also told the nurse she needs a reorder for cpap. She wears it nightly.           Past Medical History:   Diagnosis Date    Pereyra esophagus     Chest pressure 10/22/2017    10/14/2014  SE Positive for clinical myocardial ischemia 10/14/2017  Cath  Moderate diffuse disease in mid and distal LAD, normal LVFX     GERD (gastroesophageal reflux disease)     Obesity      Current Outpatient Prescriptions on File Prior to Visit   Medication Sig Dispense Refill    esomeprazole (NEXIUM) 40 MG delayed release capsule TAKE ONE CAPSULE BY MOUTH EVERY MORNING BEFORE BREAKFAST 30 capsule 5    celecoxib (CELEBREX) 200 MG capsule Take 1 capsule by mouth daily 30 capsule 3    lisinopril-hydrochlorothiazide (PRINZIDE;ZESTORETIC) 10-12.5 MG per tablet Take 1 tablet by mouth daily 30 tablet 5    butalbital-acetaminophen-caffeine (FIORICET, ESGIC) -40 MG per tablet Take 1 tablet by mouth every 4 hours as needed for Headaches 60 tablet 0    busPIRone (BUSPAR) 10 MG tablet 1/2-1 po bid prn anxiety 60 tablet 1    hydrochlorothiazide (MICROZIDE) 12.5 MG capsule 1 po daily for bp/fluid MAY TAKE WITH lisinopril/hctz 30 capsule 5    dicyclomine (BENTYL) 20 MG tablet Take 1 tablet by mouth 3 times daily as needed (spasms) 120 tablet 0    CPAP Machine MISC by Does not apply route 1 each 0    ranitidine (ZANTAC) 300 MG tablet Take 300 mg by mouth 2 times daily       No current facility-administered medications on file prior to visit. Allergies   Allergen Reactions    Morphine Hives     Past Surgical History:   Procedure Laterality Date    CARDIAC CATHETERIZATION  10/14/13  JDT    EF 50%    CHOLECYSTECTOMY      HYSTERECTOMY      TONSILLECTOMY       Family History   Problem Relation Age of Onset    Breast Cancer Paternal Aunt      Social History     Social History    Marital status:      Spouse name: N/A    Number of children: N/A    Years of education: N/A     Occupational History    Not on file. Social History Main Topics    Smoking status: Never Smoker    Smokeless tobacco: Never Used    Alcohol use Not on file    Drug use: Unknown    Sexual activity: Not on file     Other Topics Concern    Not on file     Social History Narrative    No narrative on file       Review of Systems   Constitutional: Negative for unexpected weight change. Respiratory:        Sleep apnea   Gastrointestinal: Positive for abdominal pain and constipation. Negative for abdominal distention and diarrhea. OBJECTIVE:    Physical Exam   Constitutional: She is oriented to person, place, and time. She appears well-developed and well-nourished. HENT:   Head: Normocephalic and atraumatic. Eyes: Conjunctivae and EOM are normal. Pupils are equal, round, and reactive to light. Neck: Normal range of motion. Neck supple. No tracheal deviation present. Cardiovascular: Normal rate, regular rhythm and normal heart sounds. Pulmonary/Chest: Effort normal and breath sounds normal.   Abdominal: Soft. Normal appearance and bowel sounds are normal. There is tenderness (mildly noted with deep palpation) in the right upper quadrant, epigastric area and left upper quadrant. Neurological: She is alert and oriented to person, place, and time. Skin: Skin is warm and dry.

## 2018-03-01 ASSESSMENT — ENCOUNTER SYMPTOMS
CONSTIPATION: 1
ABDOMINAL DISTENTION: 0
DIARRHEA: 0
ABDOMINAL PAIN: 1

## 2018-03-29 DIAGNOSIS — F41.8 SITUATIONAL ANXIETY: ICD-10-CM

## 2018-03-29 RX ORDER — BUSPIRONE HYDROCHLORIDE 10 MG/1
TABLET ORAL
Qty: 180 TABLET | Refills: 0 | Status: SHIPPED | OUTPATIENT
Start: 2018-03-29 | End: 2018-08-09 | Stop reason: SDUPTHER

## 2018-07-08 DIAGNOSIS — M54.2 NECK PAIN: ICD-10-CM

## 2018-07-11 RX ORDER — CELECOXIB 200 MG/1
CAPSULE ORAL
Qty: 30 CAPSULE | Refills: 3 | Status: SHIPPED | OUTPATIENT
Start: 2018-07-11 | End: 2019-02-11

## 2018-10-18 RX ORDER — ESOMEPRAZOLE MAGNESIUM 40 MG/1
CAPSULE, DELAYED RELEASE ORAL
Qty: 30 CAPSULE | Refills: 0 | Status: SHIPPED | OUTPATIENT
Start: 2018-10-18 | End: 2018-11-19 | Stop reason: SDUPTHER

## 2018-10-26 ENCOUNTER — OFFICE VISIT (OUTPATIENT)
Dept: FAMILY MEDICINE CLINIC | Age: 53
End: 2018-10-26
Payer: COMMERCIAL

## 2018-10-26 VITALS
HEIGHT: 65 IN | HEART RATE: 78 BPM | SYSTOLIC BLOOD PRESSURE: 116 MMHG | WEIGHT: 226 LBS | BODY MASS INDEX: 37.65 KG/M2 | TEMPERATURE: 97.8 F | OXYGEN SATURATION: 99 % | RESPIRATION RATE: 20 BRPM | DIASTOLIC BLOOD PRESSURE: 76 MMHG

## 2018-10-26 DIAGNOSIS — I10 ESSENTIAL HYPERTENSION: ICD-10-CM

## 2018-10-26 DIAGNOSIS — R51.9 PERSISTENT HEADACHES: ICD-10-CM

## 2018-10-26 DIAGNOSIS — Z11.4 SCREENING FOR HIV WITHOUT PRESENCE OF RISK FACTORS: ICD-10-CM

## 2018-10-26 DIAGNOSIS — K21.9 GASTROESOPHAGEAL REFLUX DISEASE, ESOPHAGITIS PRESENCE NOT SPECIFIED: Primary | ICD-10-CM

## 2018-10-26 DIAGNOSIS — Z87.898 HISTORY OF HEADACHE: ICD-10-CM

## 2018-10-26 LAB
ALBUMIN SERPL-MCNC: 4.4 G/DL (ref 3.5–5.2)
ALP BLD-CCNC: 98 U/L (ref 35–104)
ALT SERPL-CCNC: 40 U/L (ref 5–33)
ANION GAP SERPL CALCULATED.3IONS-SCNC: 17 MMOL/L (ref 7–19)
AST SERPL-CCNC: 33 U/L (ref 5–32)
BACTERIA: NEGATIVE /HPF
BASOPHILS ABSOLUTE: 0.1 K/UL (ref 0–0.2)
BASOPHILS RELATIVE PERCENT: 0.8 % (ref 0–1)
BILIRUB SERPL-MCNC: 0.3 MG/DL (ref 0.2–1.2)
BILIRUBIN URINE: NEGATIVE
BLOOD, URINE: ABNORMAL
BUN BLDV-MCNC: 20 MG/DL (ref 6–20)
CALCIUM SERPL-MCNC: 9.8 MG/DL (ref 8.6–10)
CHLORIDE BLD-SCNC: 98 MMOL/L (ref 98–111)
CHOLESTEROL, TOTAL: 276 MG/DL (ref 160–199)
CLARITY: CLEAR
CO2: 27 MMOL/L (ref 22–29)
COLOR: YELLOW
CREAT SERPL-MCNC: 1 MG/DL (ref 0.5–0.9)
EOSINOPHILS ABSOLUTE: 0.1 K/UL (ref 0–0.6)
EOSINOPHILS RELATIVE PERCENT: 1.2 % (ref 0–5)
EPITHELIAL CELLS, UA: 2 /HPF (ref 0–5)
GFR NON-AFRICAN AMERICAN: 58
GLUCOSE BLD-MCNC: 99 MG/DL (ref 74–109)
GLUCOSE URINE: NEGATIVE MG/DL
HBA1C MFR BLD: 5.6 % (ref 4–6)
HCT VFR BLD CALC: 39.2 % (ref 37–47)
HDLC SERPL-MCNC: 63 MG/DL (ref 65–121)
HEMOGLOBIN: 12.5 G/DL (ref 12–16)
HYALINE CASTS: 5 /HPF (ref 0–8)
KETONES, URINE: NEGATIVE MG/DL
LDL CHOLESTEROL CALCULATED: 171 MG/DL
LEUKOCYTE ESTERASE, URINE: NEGATIVE
LYMPHOCYTES ABSOLUTE: 2.2 K/UL (ref 1.1–4.5)
LYMPHOCYTES RELATIVE PERCENT: 32.8 % (ref 20–40)
MCH RBC QN AUTO: 29.6 PG (ref 27–31)
MCHC RBC AUTO-ENTMCNC: 31.9 G/DL (ref 33–37)
MCV RBC AUTO: 92.9 FL (ref 81–99)
MONOCYTES ABSOLUTE: 0.5 K/UL (ref 0–0.9)
MONOCYTES RELATIVE PERCENT: 6.9 % (ref 0–10)
NEUTROPHILS ABSOLUTE: 3.8 K/UL (ref 1.5–7.5)
NEUTROPHILS RELATIVE PERCENT: 58 % (ref 50–65)
NITRITE, URINE: NEGATIVE
PDW BLD-RTO: 13.8 % (ref 11.5–14.5)
PH UA: 6
PLATELET # BLD: 300 K/UL (ref 130–400)
PMV BLD AUTO: 9.7 FL (ref 9.4–12.3)
POTASSIUM SERPL-SCNC: 3.9 MMOL/L (ref 3.5–5)
PROTEIN UA: ABNORMAL MG/DL
RAPID HIV 1&2: NORMAL
RBC # BLD: 4.22 M/UL (ref 4.2–5.4)
RBC UA: 13 /HPF (ref 0–4)
SODIUM BLD-SCNC: 142 MMOL/L (ref 136–145)
SPECIFIC GRAVITY UA: 1.03
T4 FREE: 1.1 NG/DL (ref 0.9–1.7)
TOTAL PROTEIN: 8 G/DL (ref 6.6–8.7)
TRIGL SERPL-MCNC: 208 MG/DL (ref 0–149)
TSH SERPL DL<=0.05 MIU/L-ACNC: 1.87 UIU/ML (ref 0.27–4.2)
URINE REFLEX TO CULTURE: ABNORMAL
UROBILINOGEN, URINE: 0.2 E.U./DL
WBC # BLD: 6.6 K/UL (ref 4.8–10.8)
WBC UA: 2 /HPF (ref 0–5)

## 2018-10-26 PROCEDURE — 99214 OFFICE O/P EST MOD 30 MIN: CPT | Performed by: NURSE PRACTITIONER

## 2018-10-26 RX ORDER — METHYLPREDNISOLONE 4 MG/1
TABLET ORAL
Qty: 1 KIT | Refills: 0 | Status: SHIPPED | OUTPATIENT
Start: 2018-10-26 | End: 2019-02-11

## 2018-10-26 RX ORDER — LISINOPRIL AND HYDROCHLOROTHIAZIDE 12.5; 1 MG/1; MG/1
1 TABLET ORAL DAILY
Qty: 90 TABLET | Refills: 1 | Status: SHIPPED | OUTPATIENT
Start: 2018-10-26 | End: 2020-02-07 | Stop reason: ALTCHOICE

## 2018-10-26 RX ORDER — CELECOXIB 200 MG/1
CAPSULE ORAL
Qty: 30 CAPSULE | Refills: 3 | Status: CANCELLED | OUTPATIENT
Start: 2018-10-26

## 2018-10-26 RX ORDER — ESOMEPRAZOLE MAGNESIUM 40 MG/1
CAPSULE, DELAYED RELEASE ORAL
Qty: 30 CAPSULE | Refills: 0 | Status: CANCELLED | OUTPATIENT
Start: 2018-10-26

## 2018-10-26 RX ORDER — BUSPIRONE HYDROCHLORIDE 10 MG/1
TABLET ORAL
Qty: 60 TABLET | Refills: 0 | Status: CANCELLED | OUTPATIENT
Start: 2018-10-26

## 2018-10-26 RX ORDER — HYDROCHLOROTHIAZIDE 12.5 MG/1
CAPSULE, GELATIN COATED ORAL
Qty: 180 CAPSULE | Refills: 1 | Status: SHIPPED | OUTPATIENT
Start: 2018-10-26 | End: 2020-02-07 | Stop reason: ALTCHOICE

## 2018-10-26 RX ORDER — BUTALBITAL, ACETAMINOPHEN AND CAFFEINE 50; 325; 40 MG/1; MG/1; MG/1
1 TABLET ORAL EVERY 4 HOURS PRN
Qty: 60 TABLET | Refills: 0 | Status: SHIPPED | OUTPATIENT
Start: 2018-10-26 | End: 2019-08-09 | Stop reason: SDUPTHER

## 2018-10-26 ASSESSMENT — PATIENT HEALTH QUESTIONNAIRE - PHQ9
SUM OF ALL RESPONSES TO PHQ QUESTIONS 1-9: 0
2. FEELING DOWN, DEPRESSED OR HOPELESS: 0
SUM OF ALL RESPONSES TO PHQ QUESTIONS 1-9: 0
SUM OF ALL RESPONSES TO PHQ9 QUESTIONS 1 & 2: 0
1. LITTLE INTEREST OR PLEASURE IN DOING THINGS: 0

## 2018-10-26 ASSESSMENT — ENCOUNTER SYMPTOMS
DIARRHEA: 0
CONSTIPATION: 0
SHORTNESS OF BREATH: 0

## 2018-10-26 NOTE — PROGRESS NOTES
Constitutional: Negative for unexpected weight change. HENT: Negative for congestion. Respiratory: Negative for shortness of breath. Cardiovascular: Negative for chest pain. Gastrointestinal: Negative for constipation and diarrhea. Musculoskeletal: Positive for arthralgias. Neurological: Positive for headaches. OBJECTIVE:    Physical Exam   Constitutional: She is oriented to person, place, and time. She appears well-developed and well-nourished. No distress. HENT:   Head: Normocephalic and atraumatic. Right Ear: Tympanic membrane, external ear and ear canal normal.   Left Ear: Tympanic membrane, external ear and ear canal normal.   Nose: Nose normal. Right sinus exhibits no maxillary sinus tenderness and no frontal sinus tenderness. Left sinus exhibits no maxillary sinus tenderness and no frontal sinus tenderness. Mouth/Throat: Uvula is midline, oropharynx is clear and moist and mucous membranes are normal. No oropharyngeal exudate, posterior oropharyngeal edema or posterior oropharyngeal erythema. Eyes: Pupils are equal, round, and reactive to light. Conjunctivae and EOM are normal.   Neck: Normal range of motion. Neck supple. No tracheal deviation present. Cardiovascular: Normal rate, regular rhythm and normal heart sounds. Pulmonary/Chest: Effort normal and breath sounds normal. No respiratory distress. Abdominal: Soft. Bowel sounds are normal. She exhibits no distension. Lymphadenopathy:     She has no cervical adenopathy. Neurological: She is alert and oriented to person, place, and time. Skin: Skin is warm and dry. She is not diaphoretic. Psychiatric: She has a normal mood and affect. Her behavior is normal.   Nursing note and vitals reviewed.      /76 (Site: Left Upper Arm, Position: Sitting, Cuff Size: Large Adult)   Pulse 78   Temp 97.8 °F (36.6 °C) (Temporal)   Resp 20   Ht 5' 5\" (1.651 m)   Wt 226 lb (102.5 kg)   SpO2 99%   BMI 37.61 kg/m²

## 2018-10-29 ENCOUNTER — OFFICE VISIT (OUTPATIENT)
Dept: FAMILY MEDICINE CLINIC | Age: 53
End: 2018-10-29
Payer: COMMERCIAL

## 2018-10-29 VITALS
DIASTOLIC BLOOD PRESSURE: 80 MMHG | RESPIRATION RATE: 20 BRPM | HEIGHT: 65 IN | SYSTOLIC BLOOD PRESSURE: 124 MMHG | BODY MASS INDEX: 38.15 KG/M2 | TEMPERATURE: 98.3 F | WEIGHT: 229 LBS | OXYGEN SATURATION: 95 % | HEART RATE: 57 BPM

## 2018-10-29 DIAGNOSIS — E66.9 OBESE BODY HABITUS: ICD-10-CM

## 2018-10-29 DIAGNOSIS — R94.4 DECREASED GFR: Primary | ICD-10-CM

## 2018-10-29 DIAGNOSIS — E78.2 MIXED HYPERLIPIDEMIA: ICD-10-CM

## 2018-10-29 PROCEDURE — 99213 OFFICE O/P EST LOW 20 MIN: CPT | Performed by: NURSE PRACTITIONER

## 2018-10-29 NOTE — PROGRESS NOTES
SUBJECTIVE:    Patient ID: Rigoberto Lozada is a52 y.o. female. Rigoberto Lozada is here today for Weight Loss (Patient presents for weight loss management visit and would like to take [place]; states that insurance will cover it with prior auth)  . HPI:   HPI     Pt is here today for consideration of Saxenda for wt management. Pt has called insurance and been told that it will be covered with prior authorization [phone]  Pt states that her headache has resolved since last ov. Pt has been losing wt with diet and exercise but feels discouraged with the pace. Pt is aware of s/e of Saxenda and wishes to continue      No visits with results within 1 Day(s) from this visit.    Latest known visit with results is:   Orders Only on 10/26/2018   Component Date Value Ref Range Status    Color, UA 10/26/2018 YELLOW  Straw/Yellow Final    Clarity, UA 10/26/2018 Clear  Clear Final    Glucose, Ur 10/26/2018 Negative  Negative mg/dL Final    Bilirubin Urine 10/26/2018 Negative  Negative Final    Ketones, Urine 10/26/2018 Negative  Negative mg/dL Final    Specific [place], UA 10/26/2018 1.026  1.005 - 1.030 Final    Blood, Urine 10/26/2018 MODERATE* Negative Final    pH, UA 10/26/2018 6.0  5.0 - 8.0 Final    Protein, UA 10/26/2018 TRACE* Negative mg/dL Final    Urobilinogen, Urine 10/26/2018 0.2  <2.0 E.U./dL Final    Nitrite, Urine 10/26/2018 Negative  Negative Final    Leukocyte Esterase, Urine 10/26/2018 Negative  Negative Final    Urine Reflex to Culture 10/26/2018 Not Indicated   Final    WBC 10/26/2018 6.6  4.8 - 10.8 K/uL Final    RBC 10/26/2018 4.22  4.20 - 5.40 M/uL Final    Hemoglobin 10/26/2018 12.5  12.0 - 16.0 g/dL Final    Hematocrit 10/26/2018 39.2  37.0 - 47.0 % Final    MCV 10/26/2018 92.9  81.0 - 99.0 fL Final    MCH 10/26/2018 29.6  27.0 - 31.0 pg Final    MCHC 10/26/2018 31.9* 33.0 - 37.0 g/dL Final    RDW 10/26/2018 13.8  11.5 - 14.5 % Final    Platelets 80/85/9612 300  130 - 400 K/uL Final    MPV 10/26/2018 9.7  9.4 - 12.3 fL Final    Neutrophils % 10/26/2018 58.0  50.0 - 65.0 % Final    Lymphocytes % 10/26/2018 32.8  20.0 - 40.0 % Final    Monocytes % 10/26/2018 6.9  0.0 - 10.0 % Final    Eosinophils % 10/26/2018 1.2  0.0 - 5.0 % Final    Basophils % 10/26/2018 0.8  0.0 - 1.0 % Final    Neutrophils # 10/26/2018 3.8  1.5 - 7.5 K/uL Final    Lymphocytes # 10/26/2018 2.2  1.1 - 4.5 K/uL Final    Monocytes # 10/26/2018 0.50  0.00 - 0.90 K/uL Final    Eosinophils # 10/26/2018 0.10  0.00 - 0.60 K/uL Final    Basophils # 10/26/2018 0.10  0.00 - 0.20 K/uL Final    Sodium 10/26/2018 142  136 - 145 mmol/L Final    Potassium 10/26/2018 3.9  3.5 - 5.0 mmol/L Final    Chloride 10/26/2018 98  98 - 111 mmol/L Final    CO2 10/26/2018 27  22 - 29 mmol/L Final    Anion Gap 10/26/2018 17  7 - 19 mmol/L Final    Glucose 10/26/2018 99  74 - 109 mg/dL Final    BUN 10/26/2018 20  6 - 20 mg/dL Final    CREATININE 10/26/2018 1.0* 0.5 - 0.9 mg/dL Final    GFR Non- 10/26/2018 58* >60 Final    Comment: This calculation may be inaccurate for patients under the age of 25 years. For ages 25 and older, a GFR >60 mL/min/1.73m2 (not corrected for weight) is  valid for stable renal function.       Calcium 10/26/2018 9.8  8.6 - 10.0 mg/dL Final    Total Protein 10/26/2018 8.0  6.6 - 8.7 g/dL Final    Alb 10/26/2018 4.4  3.5 - 5.2 g/dL Final    Total Bilirubin 10/26/2018 0.3  0.2 - 1.2 mg/dL Final    Alkaline Phosphatase 10/26/2018 98  35 - 104 U/L Final    ALT 10/26/2018 40* 5 - 33 U/L Final    AST 10/26/2018 33* 5 - 32 U/L Final    Cholesterol, Total 10/26/2018 276* 160 - 199 mg/dL Final    Comment: <160 MG/DL=OPTIMAL    160-199 MG/DL= DESIRABLE    200-239 MG/DL=BORDERLINE-INCREASED RISK OF ATHEROSCLEROTIC  CARDIOVASCULAR DISEASE    > OR = 240 MG/DL-ASSOCIATED WITH AN INCREASED RISK OF  ATHROSCLEROTIC CARDIOVASCULAR DISEASE      Triglycerides 10/26/2018 208* 0 - 149 mg/dL

## 2018-11-06 ENCOUNTER — TELEPHONE (OUTPATIENT)
Dept: FAMILY MEDICINE CLINIC | Age: 53
End: 2018-11-06

## 2018-11-07 DIAGNOSIS — R94.4 DECREASED GFR: ICD-10-CM

## 2018-11-07 LAB
ANION GAP SERPL CALCULATED.3IONS-SCNC: 13 MMOL/L (ref 7–19)
BUN BLDV-MCNC: 10 MG/DL (ref 6–20)
CALCIUM SERPL-MCNC: 9.9 MG/DL (ref 8.6–10)
CHLORIDE BLD-SCNC: 101 MMOL/L (ref 98–111)
CO2: 28 MMOL/L (ref 22–29)
CREAT SERPL-MCNC: 1 MG/DL (ref 0.5–0.9)
GFR NON-AFRICAN AMERICAN: 58
GLUCOSE BLD-MCNC: 98 MG/DL (ref 74–109)
POTASSIUM SERPL-SCNC: 3.6 MMOL/L (ref 3.5–5)
SODIUM BLD-SCNC: 142 MMOL/L (ref 136–145)

## 2018-11-19 RX ORDER — ESOMEPRAZOLE MAGNESIUM 40 MG/1
CAPSULE, DELAYED RELEASE ORAL
Qty: 30 CAPSULE | Refills: 5 | Status: ON HOLD | OUTPATIENT
Start: 2018-11-19 | End: 2019-03-30 | Stop reason: ALTCHOICE

## 2019-02-11 ENCOUNTER — HOSPITAL ENCOUNTER (EMERGENCY)
Age: 54
Discharge: HOME | End: 2019-02-11
Payer: COMMERCIAL

## 2019-02-11 ENCOUNTER — APPOINTMENT (OUTPATIENT)
Dept: CT IMAGING | Age: 54
End: 2019-02-11
Payer: COMMERCIAL

## 2019-02-11 VITALS
RESPIRATION RATE: 19 BRPM | BODY MASS INDEX: 37.49 KG/M2 | SYSTOLIC BLOOD PRESSURE: 142 MMHG | TEMPERATURE: 98.2 F | HEIGHT: 65 IN | DIASTOLIC BLOOD PRESSURE: 70 MMHG | WEIGHT: 225 LBS | HEART RATE: 80 BPM | OXYGEN SATURATION: 94 %

## 2019-02-11 DIAGNOSIS — R10.10 PAIN OF UPPER ABDOMEN: Primary | ICD-10-CM

## 2019-02-11 DIAGNOSIS — R82.81 PYURIA: ICD-10-CM

## 2019-02-11 LAB
ALBUMIN SERPL-MCNC: 4 G/DL (ref 3.5–5.2)
ALP BLD-CCNC: 101 U/L (ref 35–104)
ALT SERPL-CCNC: 48 U/L (ref 5–33)
ANION GAP SERPL CALCULATED.3IONS-SCNC: 13 MMOL/L (ref 7–19)
AST SERPL-CCNC: 15 U/L (ref 5–32)
BACTERIA: NEGATIVE /HPF
BASOPHILS ABSOLUTE: 0.1 K/UL (ref 0–0.2)
BASOPHILS RELATIVE PERCENT: 0.9 % (ref 0–1)
BILIRUB SERPL-MCNC: <0.2 MG/DL (ref 0.2–1.2)
BILIRUBIN URINE: NEGATIVE
BLOOD, URINE: ABNORMAL
BUN BLDV-MCNC: 13 MG/DL (ref 6–20)
CALCIUM SERPL-MCNC: 8.5 MG/DL (ref 8.6–10)
CHLORIDE BLD-SCNC: 100 MMOL/L (ref 98–111)
CLARITY: CLEAR
CO2: 25 MMOL/L (ref 22–29)
COLOR: YELLOW
CREAT SERPL-MCNC: 0.8 MG/DL (ref 0.5–0.9)
EOSINOPHILS ABSOLUTE: 0.1 K/UL (ref 0–0.6)
EOSINOPHILS RELATIVE PERCENT: 0.9 % (ref 0–5)
EPITHELIAL CELLS, UA: 4 /HPF (ref 0–5)
GFR NON-AFRICAN AMERICAN: >60
GLUCOSE BLD-MCNC: 93 MG/DL (ref 74–109)
GLUCOSE URINE: NEGATIVE MG/DL
HCT VFR BLD CALC: 35.9 % (ref 37–47)
HEMOGLOBIN: 11.4 G/DL (ref 12–16)
HYALINE CASTS: 16 /HPF (ref 0–8)
KETONES, URINE: NEGATIVE MG/DL
LEUKOCYTE ESTERASE, URINE: ABNORMAL
LIPASE: 19 U/L (ref 13–60)
LYMPHOCYTES ABSOLUTE: 2.7 K/UL (ref 1.1–4.5)
LYMPHOCYTES RELATIVE PERCENT: 33.7 % (ref 20–40)
MCH RBC QN AUTO: 29.6 PG (ref 27–31)
MCHC RBC AUTO-ENTMCNC: 31.8 G/DL (ref 33–37)
MCV RBC AUTO: 93.2 FL (ref 81–99)
MONOCYTES ABSOLUTE: 0.6 K/UL (ref 0–0.9)
MONOCYTES RELATIVE PERCENT: 7.8 % (ref 0–10)
NEUTROPHILS ABSOLUTE: 4.5 K/UL (ref 1.5–7.5)
NEUTROPHILS RELATIVE PERCENT: 56.4 % (ref 50–65)
NITRITE, URINE: NEGATIVE
PDW BLD-RTO: 14.4 % (ref 11.5–14.5)
PH UA: 6
PLATELET # BLD: 280 K/UL (ref 130–400)
PMV BLD AUTO: 9.4 FL (ref 9.4–12.3)
POTASSIUM SERPL-SCNC: 3.8 MMOL/L (ref 3.5–5)
PROTEIN UA: ABNORMAL MG/DL
RBC # BLD: 3.85 M/UL (ref 4.2–5.4)
RBC UA: 6 /HPF (ref 0–4)
SODIUM BLD-SCNC: 138 MMOL/L (ref 136–145)
SPECIFIC GRAVITY UA: 1.03
TOTAL PROTEIN: 7.2 G/DL (ref 6.6–8.7)
URINE REFLEX TO CULTURE: YES
UROBILINOGEN, URINE: 0.2 E.U./DL
WBC # BLD: 7.9 K/UL (ref 4.8–10.8)
WBC UA: 27 /HPF (ref 0–5)

## 2019-02-11 PROCEDURE — 36415 COLL VENOUS BLD VENIPUNCTURE: CPT

## 2019-02-11 PROCEDURE — 99284 EMERGENCY DEPT VISIT MOD MDM: CPT

## 2019-02-11 PROCEDURE — 6360000002 HC RX W HCPCS: Performed by: NURSE PRACTITIONER

## 2019-02-11 PROCEDURE — 74177 CT ABD & PELVIS W/CONTRAST: CPT

## 2019-02-11 PROCEDURE — 87086 URINE CULTURE/COLONY COUNT: CPT

## 2019-02-11 PROCEDURE — 80053 COMPREHEN METABOLIC PANEL: CPT

## 2019-02-11 PROCEDURE — 6360000004 HC RX CONTRAST MEDICATION: Performed by: NURSE PRACTITIONER

## 2019-02-11 PROCEDURE — 83690 ASSAY OF LIPASE: CPT

## 2019-02-11 PROCEDURE — 81001 URINALYSIS AUTO W/SCOPE: CPT

## 2019-02-11 PROCEDURE — 99284 EMERGENCY DEPT VISIT MOD MDM: CPT | Performed by: NURSE PRACTITIONER

## 2019-02-11 PROCEDURE — 96374 THER/PROPH/DIAG INJ IV PUSH: CPT

## 2019-02-11 PROCEDURE — 85025 COMPLETE CBC W/AUTO DIFF WBC: CPT

## 2019-02-11 PROCEDURE — 96375 TX/PRO/DX INJ NEW DRUG ADDON: CPT

## 2019-02-11 PROCEDURE — 2580000003 HC RX 258: Performed by: NURSE PRACTITIONER

## 2019-02-11 RX ORDER — ONDANSETRON 2 MG/ML
4 INJECTION INTRAMUSCULAR; INTRAVENOUS ONCE
Status: COMPLETED | OUTPATIENT
Start: 2019-02-11 | End: 2019-02-11

## 2019-02-11 RX ORDER — CEPHALEXIN 500 MG/1
500 CAPSULE ORAL 2 TIMES DAILY
Qty: 10 CAPSULE | Refills: 0 | Status: SHIPPED | OUTPATIENT
Start: 2019-02-11 | End: 2019-02-16

## 2019-02-11 RX ORDER — ONDANSETRON 4 MG/1
4 TABLET, ORALLY DISINTEGRATING ORAL EVERY 8 HOURS PRN
Qty: 15 TABLET | Refills: 0 | Status: SHIPPED | OUTPATIENT
Start: 2019-02-11 | End: 2019-02-26

## 2019-02-11 RX ORDER — 0.9 % SODIUM CHLORIDE 0.9 %
1000 INTRAVENOUS SOLUTION INTRAVENOUS ONCE
Status: COMPLETED | OUTPATIENT
Start: 2019-02-11 | End: 2019-02-11

## 2019-02-11 RX ADMIN — Medication 1 MG: at 21:32

## 2019-02-11 RX ADMIN — IOPAMIDOL 95 ML: 755 INJECTION, SOLUTION INTRAVENOUS at 22:00

## 2019-02-11 RX ADMIN — ONDANSETRON 4 MG: 2 INJECTION INTRAMUSCULAR; INTRAVENOUS at 21:32

## 2019-02-11 RX ADMIN — SODIUM CHLORIDE 1000 ML: 9 INJECTION, SOLUTION INTRAVENOUS at 21:32

## 2019-02-11 ASSESSMENT — ENCOUNTER SYMPTOMS
ABDOMINAL PAIN: 1
VOMITING: 0
NAUSEA: 1

## 2019-02-11 ASSESSMENT — PAIN SCALES - GENERAL
PAINLEVEL_OUTOF10: 6

## 2019-02-13 LAB — URINE CULTURE, ROUTINE: NORMAL

## 2019-03-27 ENCOUNTER — APPOINTMENT (OUTPATIENT)
Dept: CT IMAGING | Age: 54
End: 2019-03-27
Payer: COMMERCIAL

## 2019-03-27 ENCOUNTER — HOSPITAL ENCOUNTER (EMERGENCY)
Age: 54
Discharge: HOME | End: 2019-03-27
Attending: EMERGENCY MEDICINE
Payer: COMMERCIAL

## 2019-03-27 VITALS
RESPIRATION RATE: 17 BRPM | BODY MASS INDEX: 37.99 KG/M2 | HEART RATE: 108 BPM | DIASTOLIC BLOOD PRESSURE: 74 MMHG | HEIGHT: 65 IN | SYSTOLIC BLOOD PRESSURE: 128 MMHG | WEIGHT: 228 LBS | OXYGEN SATURATION: 94 % | TEMPERATURE: 99.4 F

## 2019-03-27 DIAGNOSIS — R74.8 LIVER ENZYME ELEVATION: ICD-10-CM

## 2019-03-27 DIAGNOSIS — R07.89 ATYPICAL CHEST PAIN: ICD-10-CM

## 2019-03-27 DIAGNOSIS — R10.84 GENERALIZED ABDOMINAL PAIN: ICD-10-CM

## 2019-03-27 DIAGNOSIS — R11.2 NAUSEA AND VOMITING, INTRACTABILITY OF VOMITING NOT SPECIFIED, UNSPECIFIED VOMITING TYPE: Primary | ICD-10-CM

## 2019-03-27 LAB
ALBUMIN SERPL-MCNC: 4.6 G/DL (ref 3.5–5.2)
ALP BLD-CCNC: 108 U/L (ref 35–104)
ALT SERPL-CCNC: 53 U/L (ref 5–33)
ANION GAP SERPL CALCULATED.3IONS-SCNC: 11 MMOL/L (ref 7–19)
AST SERPL-CCNC: 41 U/L (ref 5–32)
BILIRUB SERPL-MCNC: 0.5 MG/DL (ref 0.2–1.2)
BILIRUBIN URINE: NEGATIVE
BLOOD, URINE: ABNORMAL
BUN BLDV-MCNC: 13 MG/DL (ref 6–20)
CALCIUM SERPL-MCNC: 9.9 MG/DL (ref 8.6–10)
CHLORIDE BLD-SCNC: 100 MMOL/L (ref 98–111)
CLARITY: CLEAR
CO2: 26 MMOL/L (ref 22–29)
COLOR: YELLOW
CREAT SERPL-MCNC: 0.9 MG/DL (ref 0.5–0.9)
EKG P AXIS: 29 DEGREES
EKG P AXIS: 33 DEGREES
EKG P-R INTERVAL: 148 MS
EKG P-R INTERVAL: 152 MS
EKG Q-T INTERVAL: 332 MS
EKG Q-T INTERVAL: 354 MS
EKG QRS DURATION: 82 MS
EKG QRS DURATION: 88 MS
EKG QTC CALCULATION (BAZETT): 400 MS
EKG QTC CALCULATION (BAZETT): 401 MS
EKG T AXIS: 27 DEGREES
EKG T AXIS: 36 DEGREES
EPITHELIAL CELLS, UA: 1 /HPF (ref 0–5)
GFR NON-AFRICAN AMERICAN: >60
GLUCOSE BLD-MCNC: 120 MG/DL (ref 74–109)
GLUCOSE URINE: NEGATIVE MG/DL
HCT VFR BLD CALC: 43.4 % (ref 37–47)
HEMOGLOBIN: 14 G/DL (ref 12–16)
HYALINE CASTS: 0 /HPF (ref 0–8)
KETONES, URINE: NEGATIVE MG/DL
LACTIC ACID: 1.6 MMOL/L (ref 0.5–1.9)
LEUKOCYTE ESTERASE, URINE: ABNORMAL
LIPASE: 19 U/L (ref 13–60)
MCH RBC QN AUTO: 30.1 PG (ref 27–31)
MCHC RBC AUTO-ENTMCNC: 32.3 G/DL (ref 33–37)
MCV RBC AUTO: 93.3 FL (ref 81–99)
NITRITE, URINE: NEGATIVE
PDW BLD-RTO: 13.6 % (ref 11.5–14.5)
PH UA: 8.5 (ref 5–8)
PLATELET # BLD: 352 K/UL (ref 130–400)
PMV BLD AUTO: 9.8 FL (ref 9.4–12.3)
POTASSIUM SERPL-SCNC: 4.2 MMOL/L (ref 3.5–5)
PROTEIN UA: NEGATIVE MG/DL
RBC # BLD: 4.65 M/UL (ref 4.2–5.4)
RBC UA: 5 /HPF (ref 0–4)
SODIUM BLD-SCNC: 137 MMOL/L (ref 136–145)
SPECIFIC GRAVITY UA: 1.02 (ref 1–1.03)
TOTAL PROTEIN: 8.4 G/DL (ref 6.6–8.7)
TROPONIN: <0.01 NG/ML (ref 0–0.03)
TROPONIN: <0.01 NG/ML (ref 0–0.03)
UROBILINOGEN, URINE: 0.2 E.U./DL
WBC # BLD: 12 K/UL (ref 4.8–10.8)
WBC UA: 2 /HPF (ref 0–5)

## 2019-03-27 PROCEDURE — 83605 ASSAY OF LACTIC ACID: CPT

## 2019-03-27 PROCEDURE — 6360000002 HC RX W HCPCS: Performed by: EMERGENCY MEDICINE

## 2019-03-27 PROCEDURE — 36415 COLL VENOUS BLD VENIPUNCTURE: CPT

## 2019-03-27 PROCEDURE — 96374 THER/PROPH/DIAG INJ IV PUSH: CPT

## 2019-03-27 PROCEDURE — 99284 EMERGENCY DEPT VISIT MOD MDM: CPT | Performed by: EMERGENCY MEDICINE

## 2019-03-27 PROCEDURE — 83690 ASSAY OF LIPASE: CPT

## 2019-03-27 PROCEDURE — 2580000003 HC RX 258: Performed by: EMERGENCY MEDICINE

## 2019-03-27 PROCEDURE — 85027 COMPLETE CBC AUTOMATED: CPT

## 2019-03-27 PROCEDURE — 81001 URINALYSIS AUTO W/SCOPE: CPT

## 2019-03-27 PROCEDURE — 96376 TX/PRO/DX INJ SAME DRUG ADON: CPT

## 2019-03-27 PROCEDURE — 93005 ELECTROCARDIOGRAM TRACING: CPT

## 2019-03-27 PROCEDURE — 80053 COMPREHEN METABOLIC PANEL: CPT

## 2019-03-27 PROCEDURE — 6360000004 HC RX CONTRAST MEDICATION: Performed by: EMERGENCY MEDICINE

## 2019-03-27 PROCEDURE — 74177 CT ABD & PELVIS W/CONTRAST: CPT

## 2019-03-27 PROCEDURE — 96375 TX/PRO/DX INJ NEW DRUG ADDON: CPT

## 2019-03-27 PROCEDURE — 84484 ASSAY OF TROPONIN QUANT: CPT

## 2019-03-27 PROCEDURE — 99284 EMERGENCY DEPT VISIT MOD MDM: CPT

## 2019-03-27 RX ORDER — 0.9 % SODIUM CHLORIDE 0.9 %
1000 INTRAVENOUS SOLUTION INTRAVENOUS ONCE
Status: COMPLETED | OUTPATIENT
Start: 2019-03-27 | End: 2019-03-27

## 2019-03-27 RX ORDER — ONDANSETRON 2 MG/ML
INJECTION INTRAMUSCULAR; INTRAVENOUS
Status: DISCONTINUED
Start: 2019-03-27 | End: 2019-03-27 | Stop reason: HOSPADM

## 2019-03-27 RX ORDER — ONDANSETRON 2 MG/ML
4 INJECTION INTRAMUSCULAR; INTRAVENOUS ONCE
Status: COMPLETED | OUTPATIENT
Start: 2019-03-27 | End: 2019-03-27

## 2019-03-27 RX ORDER — ONDANSETRON 4 MG/1
4 TABLET, ORALLY DISINTEGRATING ORAL EVERY 8 HOURS PRN
Qty: 12 TABLET | Refills: 0 | Status: SHIPPED | OUTPATIENT
Start: 2019-03-27 | End: 2020-02-07 | Stop reason: ALTCHOICE

## 2019-03-27 RX ADMIN — SODIUM CHLORIDE 1000 ML: 9 INJECTION, SOLUTION INTRAVENOUS at 06:05

## 2019-03-27 RX ADMIN — ONDANSETRON 4 MG: 2 INJECTION INTRAMUSCULAR; INTRAVENOUS at 06:05

## 2019-03-27 RX ADMIN — ONDANSETRON 4 MG: 2 INJECTION INTRAMUSCULAR; INTRAVENOUS at 07:46

## 2019-03-27 RX ADMIN — IOPAMIDOL 90 ML: 755 INJECTION, SOLUTION INTRAVENOUS at 07:22

## 2019-03-27 RX ADMIN — HYDROMORPHONE HYDROCHLORIDE 1 MG: 1 INJECTION, SOLUTION INTRAMUSCULAR; INTRAVENOUS; SUBCUTANEOUS at 07:06

## 2019-03-27 ASSESSMENT — ENCOUNTER SYMPTOMS
NAUSEA: 1
ABDOMINAL DISTENTION: 0
VOMITING: 1
SHORTNESS OF BREATH: 0
WHEEZING: 0
ABDOMINAL PAIN: 1

## 2019-03-27 ASSESSMENT — PAIN SCALES - GENERAL
PAINLEVEL_OUTOF10: 2
PAINLEVEL_OUTOF10: 8

## 2019-03-30 ENCOUNTER — HOSPITAL ENCOUNTER (INPATIENT)
Age: 54
LOS: 3 days | Discharge: HOME | DRG: 200 | End: 2019-04-02
Attending: EMERGENCY MEDICINE | Admitting: SURGERY
Payer: OTHER MISCELLANEOUS

## 2019-03-30 ENCOUNTER — APPOINTMENT (OUTPATIENT)
Dept: CT IMAGING | Age: 54
DRG: 200 | End: 2019-03-30
Payer: OTHER MISCELLANEOUS

## 2019-03-30 ENCOUNTER — APPOINTMENT (OUTPATIENT)
Dept: GENERAL RADIOLOGY | Age: 54
DRG: 200 | End: 2019-03-30
Payer: OTHER MISCELLANEOUS

## 2019-03-30 DIAGNOSIS — S22.41XA CLOSED TRAUMATIC FRACTURE OF RIBS OF RIGHT SIDE WITH PNEUMOTHORAX, INITIAL ENCOUNTER: ICD-10-CM

## 2019-03-30 DIAGNOSIS — S22.41XD CLOSED FRACTURE OF MULTIPLE RIBS OF RIGHT SIDE WITH ROUTINE HEALING, SUBSEQUENT ENCOUNTER: ICD-10-CM
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
## 2019-03-30 DIAGNOSIS — S27.0XXA CLOSED TRAUMATIC FRACTURE OF RIBS OF RIGHT SIDE WITH PNEUMOTHORAX, INITIAL ENCOUNTER: ICD-10-CM

## 2019-03-30 DIAGNOSIS — S22.49XA MULTIPLE RIB FRACTURES INVOLVING FIRST RIB: ICD-10-CM

## 2019-03-30 DIAGNOSIS — S80.01XA CONTUSION OF RIGHT KNEE, INITIAL ENCOUNTER: ICD-10-CM

## 2019-03-30 DIAGNOSIS — S50.11XA CONTUSION OF RIGHT FOREARM, INITIAL ENCOUNTER: ICD-10-CM

## 2019-03-30 LAB
ALBUMIN SERPL-MCNC: 3.9 G/DL (ref 3.5–5.2)
ALP BLD-CCNC: 95 U/L (ref 35–104)
ALT SERPL-CCNC: 81 U/L (ref 5–33)
AMYLASE: 31 U/L (ref 28–100)
ANION GAP SERPL CALCULATED.3IONS-SCNC: 13 MMOL/L (ref 7–19)
AST SERPL-CCNC: 112 U/L (ref 5–32)
BASE EXCESS ARTERIAL: -1.2 MMOL/L (ref -2–2)
BASOPHILS ABSOLUTE: 0 K/UL (ref 0–0.2)
BASOPHILS RELATIVE PERCENT: 0.3 % (ref 0–1)
BILIRUB SERPL-MCNC: <0.2 MG/DL (ref 0.2–1.2)
BUN BLDV-MCNC: 20 MG/DL (ref 6–20)
CALCIUM SERPL-MCNC: 8.8 MG/DL (ref 8.6–10)
CARBOXYHEMOGLOBIN ARTERIAL: 1.4 % (ref 0–5)
CHLORIDE BLD-SCNC: 105 MMOL/L (ref 98–111)
CO2: 22 MMOL/L (ref 22–29)
CREAT SERPL-MCNC: 0.8 MG/DL (ref 0.5–0.9)
EOSINOPHILS ABSOLUTE: 0.1 K/UL (ref 0–0.6)
EOSINOPHILS RELATIVE PERCENT: 0.9 % (ref 0–5)
GFR NON-AFRICAN AMERICAN: >60
GLUCOSE BLD-MCNC: 115 MG/DL (ref 74–109)
HCO3 ARTERIAL: 23.6 MMOL/L (ref 22–26)
HCT VFR BLD CALC: 39.2 % (ref 37–47)
HEMOGLOBIN, ART, EXTENDED: 12 G/DL (ref 12–16)
HEMOGLOBIN: 12.4 G/DL (ref 12–16)
LIPASE: 38 U/L (ref 13–60)
LYMPHOCYTES ABSOLUTE: 2 K/UL (ref 1.1–4.5)
LYMPHOCYTES RELATIVE PERCENT: 14.1 % (ref 20–40)
MAGNESIUM: 1.9 MG/DL (ref 1.6–2.6)
MCH RBC QN AUTO: 30.1 PG (ref 27–31)
MCHC RBC AUTO-ENTMCNC: 31.6 G/DL (ref 33–37)
MCV RBC AUTO: 95.1 FL (ref 81–99)
METHEMOGLOBIN ARTERIAL: 0.7 %
MONOCYTES ABSOLUTE: 0.8 K/UL (ref 0–0.9)
MONOCYTES RELATIVE PERCENT: 5.4 % (ref 0–10)
NEUTROPHILS ABSOLUTE: 11 K/UL (ref 1.5–7.5)
NEUTROPHILS RELATIVE PERCENT: 78.6 % (ref 50–65)
O2 CONTENT ARTERIAL: 15.2 ML/DL
O2 SAT, ARTERIAL: 90.2 %
O2 THERAPY: ABNORMAL
PCO2 ARTERIAL: 39 MMHG (ref 35–45)
PDW BLD-RTO: 13.8 % (ref 11.5–14.5)
PH ARTERIAL: 7.39 (ref 7.35–7.45)
PLATELET # BLD: 301 K/UL (ref 130–400)
PMV BLD AUTO: 9.6 FL (ref 9.4–12.3)
PO2 ARTERIAL: 55 MMHG (ref 80–100)
POTASSIUM REFLEX MAGNESIUM: 3.4 MMOL/L (ref 3.5–5)
POTASSIUM, WHOLE BLOOD: 3.1
RBC # BLD: 4.12 M/UL (ref 4.2–5.4)
SODIUM BLD-SCNC: 140 MMOL/L (ref 136–145)
TOTAL PROTEIN: 7.2 G/DL (ref 6.6–8.7)
TROPONIN: <0.01 NG/ML (ref 0–0.03)
WBC # BLD: 14 K/UL (ref 4.8–10.8)

## 2019-03-30 PROCEDURE — 72128 CT CHEST SPINE W/O DYE: CPT

## 2019-03-30 PROCEDURE — 93005 ELECTROCARDIOGRAM TRACING: CPT

## 2019-03-30 PROCEDURE — 80053 COMPREHEN METABOLIC PANEL: CPT

## 2019-03-30 PROCEDURE — 2580000003 HC RX 258: Performed by: SURGERY

## 2019-03-30 PROCEDURE — 99285 EMERGENCY DEPT VISIT HI MDM: CPT

## 2019-03-30 PROCEDURE — 6370000000 HC RX 637 (ALT 250 FOR IP): Performed by: SURGERY

## 2019-03-30 PROCEDURE — 84132 ASSAY OF SERUM POTASSIUM: CPT

## 2019-03-30 PROCEDURE — 74177 CT ABD & PELVIS W/CONTRAST: CPT

## 2019-03-30 PROCEDURE — 82150 ASSAY OF AMYLASE: CPT

## 2019-03-30 PROCEDURE — 1210000000 HC MED SURG R&B

## 2019-03-30 PROCEDURE — 36415 COLL VENOUS BLD VENIPUNCTURE: CPT

## 2019-03-30 PROCEDURE — 6360000004 HC RX CONTRAST MEDICATION: Performed by: EMERGENCY MEDICINE

## 2019-03-30 PROCEDURE — 71260 CT THORAX DX C+: CPT

## 2019-03-30 PROCEDURE — 70450 CT HEAD/BRAIN W/O DYE: CPT

## 2019-03-30 PROCEDURE — 36600 WITHDRAWAL OF ARTERIAL BLOOD: CPT

## 2019-03-30 PROCEDURE — 99285 EMERGENCY DEPT VISIT HI MDM: CPT | Performed by: EMERGENCY MEDICINE

## 2019-03-30 PROCEDURE — 6360000002 HC RX W HCPCS

## 2019-03-30 PROCEDURE — 84484 ASSAY OF TROPONIN QUANT: CPT

## 2019-03-30 PROCEDURE — 71045 X-RAY EXAM CHEST 1 VIEW: CPT

## 2019-03-30 PROCEDURE — 73560 X-RAY EXAM OF KNEE 1 OR 2: CPT

## 2019-03-30 PROCEDURE — 85025 COMPLETE CBC W/AUTO DIFF WBC: CPT

## 2019-03-30 PROCEDURE — 6360000002 HC RX W HCPCS: Performed by: SURGERY

## 2019-03-30 PROCEDURE — 6360000002 HC RX W HCPCS: Performed by: EMERGENCY MEDICINE

## 2019-03-30 PROCEDURE — 82803 BLOOD GASES ANY COMBINATION: CPT

## 2019-03-30 PROCEDURE — 83690 ASSAY OF LIPASE: CPT

## 2019-03-30 PROCEDURE — 83735 ASSAY OF MAGNESIUM: CPT

## 2019-03-30 PROCEDURE — 72125 CT NECK SPINE W/O DYE: CPT

## 2019-03-30 RX ORDER — SODIUM CHLORIDE 0.9 % (FLUSH) 0.9 %
10 SYRINGE (ML) INJECTION EVERY 12 HOURS SCHEDULED
Status: DISCONTINUED | OUTPATIENT
Start: 2019-03-30 | End: 2019-04-02 | Stop reason: HOSPADM

## 2019-03-30 RX ORDER — LISINOPRIL 10 MG/1
10 TABLET ORAL DAILY
Status: DISCONTINUED | OUTPATIENT
Start: 2019-03-30 | End: 2019-04-02 | Stop reason: HOSPADM

## 2019-03-30 RX ORDER — SODIUM CHLORIDE 9 MG/ML
INJECTION, SOLUTION INTRAVENOUS CONTINUOUS
Status: DISCONTINUED | OUTPATIENT
Start: 2019-03-30 | End: 2019-04-02 | Stop reason: HOSPADM

## 2019-03-30 RX ORDER — LORAZEPAM 2 MG/ML
1 INJECTION INTRAMUSCULAR ONCE
Status: COMPLETED | OUTPATIENT
Start: 2019-03-30 | End: 2019-03-30

## 2019-03-30 RX ORDER — HYDROCHLOROTHIAZIDE 12.5 MG/1
12.5 CAPSULE, GELATIN COATED ORAL DAILY
Status: DISCONTINUED | OUTPATIENT
Start: 2019-03-30 | End: 2019-04-02 | Stop reason: HOSPADM

## 2019-03-30 RX ORDER — ONDANSETRON 2 MG/ML
INJECTION INTRAMUSCULAR; INTRAVENOUS
Status: COMPLETED
Start: 2019-03-30 | End: 2019-03-30

## 2019-03-30 RX ORDER — LISINOPRIL AND HYDROCHLOROTHIAZIDE 12.5; 1 MG/1; MG/1
1 TABLET ORAL DAILY
Status: DISCONTINUED | OUTPATIENT
Start: 2019-03-30 | End: 2019-03-30 | Stop reason: CLARIF

## 2019-03-30 RX ORDER — FAMOTIDINE 20 MG/1
40 TABLET, FILM COATED ORAL 2 TIMES DAILY
Status: DISCONTINUED | OUTPATIENT
Start: 2019-03-30 | End: 2019-04-02 | Stop reason: HOSPADM

## 2019-03-30 RX ORDER — ONDANSETRON 2 MG/ML
4 INJECTION INTRAMUSCULAR; INTRAVENOUS EVERY 8 HOURS PRN
Status: DISCONTINUED | OUTPATIENT
Start: 2019-03-30 | End: 2019-04-02 | Stop reason: HOSPADM

## 2019-03-30 RX ORDER — SODIUM CHLORIDE 0.9 % (FLUSH) 0.9 %
10 SYRINGE (ML) INJECTION PRN
Status: DISCONTINUED | OUTPATIENT
Start: 2019-03-30 | End: 2019-04-02 | Stop reason: HOSPADM

## 2019-03-30 RX ORDER — ACETAMINOPHEN 325 MG/1
650 TABLET ORAL EVERY 4 HOURS PRN
Status: DISCONTINUED | OUTPATIENT
Start: 2019-03-30 | End: 2019-04-02 | Stop reason: HOSPADM

## 2019-03-30 RX ORDER — FENTANYL CITRATE 50 UG/ML
50 INJECTION, SOLUTION INTRAMUSCULAR; INTRAVENOUS EVERY 30 MIN PRN
Status: DISCONTINUED | OUTPATIENT
Start: 2019-03-30 | End: 2019-04-02 | Stop reason: HOSPADM

## 2019-03-30 RX ORDER — PANTOPRAZOLE SODIUM 40 MG/1
40 TABLET, DELAYED RELEASE ORAL
Status: DISCONTINUED | OUTPATIENT
Start: 2019-03-31 | End: 2019-04-02 | Stop reason: HOSPADM

## 2019-03-30 RX ORDER — ONDANSETRON 2 MG/ML
4 INJECTION INTRAMUSCULAR; INTRAVENOUS ONCE
Status: COMPLETED | OUTPATIENT
Start: 2019-03-30 | End: 2019-03-30

## 2019-03-30 RX ADMIN — ONDANSETRON 4 MG: 2 INJECTION INTRAMUSCULAR; INTRAVENOUS at 20:23

## 2019-03-30 RX ADMIN — FENTANYL CITRATE 50 MCG: 50 INJECTION INTRAMUSCULAR; INTRAVENOUS at 19:05

## 2019-03-30 RX ADMIN — ONDANSETRON 4 MG: 2 INJECTION INTRAMUSCULAR; INTRAVENOUS at 18:09

## 2019-03-30 RX ADMIN — HYDROMORPHONE HYDROCHLORIDE 1 MG: 1 INJECTION, SOLUTION INTRAMUSCULAR; INTRAVENOUS; SUBCUTANEOUS at 20:24

## 2019-03-30 RX ADMIN — FENTANYL CITRATE 50 MCG: 50 INJECTION INTRAMUSCULAR; INTRAVENOUS at 18:09

## 2019-03-30 RX ADMIN — LORAZEPAM 1 MG: 2 INJECTION INTRAMUSCULAR; INTRAVENOUS at 19:05

## 2019-03-30 RX ADMIN — SODIUM CHLORIDE: 9 INJECTION, SOLUTION INTRAVENOUS at 20:24

## 2019-03-30 RX ADMIN — HYDROMORPHONE HYDROCHLORIDE 1 MG: 1 INJECTION, SOLUTION INTRAMUSCULAR; INTRAVENOUS; SUBCUTANEOUS at 23:34

## 2019-03-30 RX ADMIN — FENTANYL CITRATE 50 MCG: 50 INJECTION INTRAMUSCULAR; INTRAVENOUS at 16:44

## 2019-03-30 RX ADMIN — IOPAMIDOL 90 ML: 755 INJECTION, SOLUTION INTRAVENOUS at 17:12

## 2019-03-30 RX ADMIN — FAMOTIDINE 40 MG: 20 TABLET ORAL at 22:10

## 2019-03-30 ASSESSMENT — ENCOUNTER SYMPTOMS
EYE DISCHARGE: 0
SINUS PRESSURE: 0
BLOOD IN STOOL: 0
SORE THROAT: 0
CONSTIPATION: 0
NAUSEA: 0
CHOKING: 0
FACIAL SWELLING: 0
ABDOMINAL PAIN: 0
DIARRHEA: 0
APNEA: 0
ABDOMINAL DISTENTION: 0
VOICE CHANGE: 0

## 2019-03-30 ASSESSMENT — PAIN SCALES - GENERAL
PAINLEVEL_OUTOF10: 5
PAINLEVEL_OUTOF10: 0
PAINLEVEL_OUTOF10: 8
PAINLEVEL_OUTOF10: 5
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
## 2019-03-30 NOTE — ED NOTES
Patient states she is feeling anxious and in pain.  Dr. Reardon Doctor notified and states to use the PRN fentanyl dosing [id number] [facility]  03/30/19 2652

## 2019-03-30 NOTE — ED NOTES
Pt aware urine sample is needed, but unable to provide sample. Refusing cath.      Radha Burkett RN  03/30/19 [id number]

## 2019-03-31 ENCOUNTER — APPOINTMENT (OUTPATIENT)
Dept: GENERAL RADIOLOGY | Age: 54
DRG: 200 | End: 2019-03-31
Payer: OTHER MISCELLANEOUS

## 2019-03-31 PROBLEM — S22.31XA CLOSED FRACTURE OF RIB OF RIGHT SIDE: Status: ACTIVE | Noted: 2019-03-31

## 2019-03-31 LAB
BACTERIA: NEGATIVE /HPF
BILIRUBIN URINE: ABNORMAL
BLOOD, URINE: ABNORMAL
CLARITY: ABNORMAL
COLOR: ABNORMAL
EKG P AXIS: 60 DEGREES
EKG P-R INTERVAL: 154 MS
EKG Q-T INTERVAL: 322 MS
EKG QRS DURATION: 96 MS
EKG QTC CALCULATION (BAZETT): 365 MS
EKG T AXIS: 58 DEGREES
EPITHELIAL CELLS, UA: 12 /HPF (ref 0–5)
GLUCOSE URINE: NEGATIVE MG/DL
HYALINE CASTS: 18 /HPF (ref 0–8)
KETONES, URINE: NEGATIVE MG/DL
LEUKOCYTE ESTERASE, URINE: ABNORMAL
NITRITE, URINE: NEGATIVE
PH UA: 6 (ref 5–8)
PROTEIN UA: 30 MG/DL
RBC UA: 9 /HPF (ref 0–4)
SPECIFIC GRAVITY UA: >1.045 (ref 1–1.03)
URINE REFLEX TO CULTURE: YES
UROBILINOGEN, URINE: 0.2 E.U./DL
WBC UA: 6 /HPF (ref 0–5)

## 2019-03-31 PROCEDURE — 81001 URINALYSIS AUTO W/SCOPE: CPT

## 2019-03-31 PROCEDURE — 2700000000 HC OXYGEN THERAPY PER DAY

## 2019-03-31 PROCEDURE — 87077 CULTURE AEROBIC IDENTIFY: CPT

## 2019-03-31 PROCEDURE — 87086 URINE CULTURE/COLONY COUNT: CPT

## 2019-03-31 PROCEDURE — 1210000000 HC MED SURG R&B

## 2019-03-31 PROCEDURE — 94640 AIRWAY INHALATION TREATMENT: CPT

## 2019-03-31 PROCEDURE — 94664 DEMO&/EVAL PT USE INHALER: CPT

## 2019-03-31 PROCEDURE — 99222 1ST HOSP IP/OBS MODERATE 55: CPT | Performed by: SURGERY

## 2019-03-31 PROCEDURE — 6370000000 HC RX 637 (ALT 250 FOR IP): Performed by: SURGERY

## 2019-03-31 PROCEDURE — 71046 X-RAY EXAM CHEST 2 VIEWS: CPT

## 2019-03-31 PROCEDURE — 71045 X-RAY EXAM CHEST 1 VIEW: CPT

## 2019-03-31 PROCEDURE — 6360000002 HC RX W HCPCS: Performed by: SURGERY

## 2019-03-31 RX ORDER — LIDOCAINE 4 G/G
1 PATCH TOPICAL DAILY
Status: DISCONTINUED | OUTPATIENT
Start: 2019-03-31 | End: 2019-04-02 | Stop reason: HOSPADM

## 2019-03-31 RX ORDER — METOCLOPRAMIDE HYDROCHLORIDE 5 MG/ML
10 INJECTION INTRAMUSCULAR; INTRAVENOUS EVERY 6 HOURS
Status: DISCONTINUED | OUTPATIENT
Start: 2019-03-31 | End: 2019-04-02

## 2019-03-31 RX ORDER — IPRATROPIUM BROMIDE AND ALBUTEROL SULFATE 2.5; .5 MG/3ML; MG/3ML
1 SOLUTION RESPIRATORY (INHALATION)
Status: DISCONTINUED | OUTPATIENT
Start: 2019-03-31 | End: 2019-04-02 | Stop reason: HOSPADM

## 2019-03-31 RX ORDER — HYDROCODONE BITARTRATE AND ACETAMINOPHEN 5; 325 MG/1; MG/1
1 TABLET ORAL EVERY 4 HOURS PRN
Status: DISCONTINUED | OUTPATIENT
Start: 2019-03-31 | End: 2019-04-02 | Stop reason: HOSPADM

## 2019-03-31 RX ORDER — HYDROCODONE BITARTRATE AND ACETAMINOPHEN 5; 325 MG/1; MG/1
2 TABLET ORAL EVERY 4 HOURS PRN
Status: DISCONTINUED | OUTPATIENT
Start: 2019-03-31 | End: 2019-04-02 | Stop reason: HOSPADM

## 2019-03-31 RX ADMIN — ENOXAPARIN SODIUM 40 MG: 40 INJECTION SUBCUTANEOUS at 07:57

## 2019-03-31 RX ADMIN — IPRATROPIUM BROMIDE AND ALBUTEROL SULFATE 1 AMPULE: 2.5; .5 SOLUTION RESPIRATORY (INHALATION) at 19:03

## 2019-03-31 RX ADMIN — PANTOPRAZOLE SODIUM 40 MG: 40 TABLET, DELAYED RELEASE ORAL at 05:58

## 2019-03-31 RX ADMIN — ONDANSETRON 4 MG: 2 INJECTION INTRAMUSCULAR; INTRAVENOUS at 02:57

## 2019-03-31 RX ADMIN — FAMOTIDINE 40 MG: 20 TABLET ORAL at 07:57

## 2019-03-31 RX ADMIN — LISINOPRIL 10 MG: 10 TABLET ORAL at 07:57

## 2019-03-31 RX ADMIN — IPRATROPIUM BROMIDE AND ALBUTEROL SULFATE 1 AMPULE: 2.5; .5 SOLUTION RESPIRATORY (INHALATION) at 11:29

## 2019-03-31 RX ADMIN — HYDROMORPHONE HYDROCHLORIDE 1 MG: 1 INJECTION, SOLUTION INTRAMUSCULAR; INTRAVENOUS; SUBCUTANEOUS at 02:57

## 2019-03-31 RX ADMIN — FAMOTIDINE 40 MG: 20 TABLET ORAL at 20:13

## 2019-03-31 RX ADMIN — METOCLOPRAMIDE 10 MG: 5 INJECTION, SOLUTION INTRAMUSCULAR; INTRAVENOUS at 11:04

## 2019-03-31 RX ADMIN — METOCLOPRAMIDE 10 MG: 5 INJECTION, SOLUTION INTRAMUSCULAR; INTRAVENOUS at 16:10

## 2019-03-31 RX ADMIN — METOCLOPRAMIDE 10 MG: 5 INJECTION, SOLUTION INTRAMUSCULAR; INTRAVENOUS at 23:02

## 2019-03-31 RX ADMIN — HYDROMORPHONE HYDROCHLORIDE 1 MG: 1 INJECTION, SOLUTION INTRAMUSCULAR; INTRAVENOUS; SUBCUTANEOUS at 05:58

## 2019-03-31 RX ADMIN — HYDROCODONE BITARTRATE AND ACETAMINOPHEN 2 TABLET: 5; 325 TABLET ORAL at 16:09

## 2019-03-31 RX ADMIN — HYDROCODONE BITARTRATE AND ACETAMINOPHEN 2 TABLET: 5; 325 TABLET ORAL at 11:04

## 2019-03-31 RX ADMIN — HYDROCHLOROTHIAZIDE 12.5 MG: 12.5 CAPSULE ORAL at 07:57

## 2019-03-31 RX ADMIN — ONDANSETRON 4 MG: 2 INJECTION INTRAMUSCULAR; INTRAVENOUS at 13:16

## 2019-03-31 RX ADMIN — IPRATROPIUM BROMIDE AND ALBUTEROL SULFATE 1 AMPULE: 2.5; .5 SOLUTION RESPIRATORY (INHALATION) at 15:50

## 2019-03-31 RX ADMIN — HYDROCODONE BITARTRATE AND ACETAMINOPHEN 2 TABLET: 5; 325 TABLET ORAL at 20:13

## 2019-03-31 RX ADMIN — ONDANSETRON 4 MG: 2 INJECTION INTRAMUSCULAR; INTRAVENOUS at 23:02

## 2019-03-31 ASSESSMENT — ENCOUNTER SYMPTOMS
COUGH: 1
ABDOMINAL PAIN: 0
SHORTNESS OF BREATH: 0
CONSTIPATION: 0
RHINORRHEA: 0
SORE THROAT: 0
WHEEZING: 1
ABDOMINAL DISTENTION: 0
EYE REDNESS: 0
EYE PAIN: 0
BACK PAIN: 0

## 2019-03-31 ASSESSMENT — PAIN SCALES - GENERAL
PAINLEVEL_OUTOF10: 6
PAINLEVEL_OUTOF10: 0
PAINLEVEL_OUTOF10: 0
PAINLEVEL_OUTOF10: 10
PAINLEVEL_OUTOF10: 3
PAINLEVEL_OUTOF10: 0
PAINLEVEL_OUTOF10: 3
PAINLEVEL_OUTOF10: 8
PAINLEVEL_OUTOF10: 9
PAINLEVEL_OUTOF10: 5
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
## 2019-03-31 NOTE — PROGRESS NOTES
Pt was complaint about sharp pain in upper left chest area when she coughs, she also has some anxiety,  Chest xray that was ordered for 0600 we did now, to make sure nothing has changed.
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
## 2019-04-01 ENCOUNTER — APPOINTMENT (OUTPATIENT)
Dept: GENERAL RADIOLOGY | Age: 54
DRG: 200 | End: 2019-04-01
Payer: OTHER MISCELLANEOUS

## 2019-04-01 PROCEDURE — 1210000000 HC MED SURG R&B

## 2019-04-01 PROCEDURE — 6370000000 HC RX 637 (ALT 250 FOR IP): Performed by: SURGERY

## 2019-04-01 PROCEDURE — 97165 OT EVAL LOW COMPLEX 30 MIN: CPT

## 2019-04-01 PROCEDURE — 2580000003 HC RX 258: Performed by: SURGERY

## 2019-04-01 PROCEDURE — 6360000002 HC RX W HCPCS: Performed by: SURGERY

## 2019-04-01 PROCEDURE — 97535 SELF CARE MNGMENT TRAINING: CPT

## 2019-04-01 PROCEDURE — 94640 AIRWAY INHALATION TREATMENT: CPT

## 2019-04-01 PROCEDURE — 71046 X-RAY EXAM CHEST 2 VIEWS: CPT

## 2019-04-01 PROCEDURE — 99232 SBSQ HOSP IP/OBS MODERATE 35: CPT | Performed by: SURGERY

## 2019-04-01 PROCEDURE — 6360000002 HC RX W HCPCS: Performed by: NURSE PRACTITIONER

## 2019-04-01 RX ORDER — CYCLOBENZAPRINE HCL 5 MG
5 TABLET ORAL 3 TIMES DAILY PRN
Status: DISCONTINUED | OUTPATIENT
Start: 2019-04-01 | End: 2019-04-02 | Stop reason: HOSPADM

## 2019-04-01 RX ORDER — CYCLOBENZAPRINE HCL 10 MG
10 TABLET ORAL 3 TIMES DAILY PRN
Status: DISCONTINUED | OUTPATIENT
Start: 2019-04-01 | End: 2019-04-01

## 2019-04-01 RX ORDER — NITROFURANTOIN 25; 75 MG/1; MG/1
100 CAPSULE ORAL EVERY 12 HOURS SCHEDULED
Status: DISCONTINUED | OUTPATIENT
Start: 2019-04-01 | End: 2019-04-02 | Stop reason: HOSPADM

## 2019-04-01 RX ORDER — KETOROLAC TROMETHAMINE 30 MG/ML
30 INJECTION, SOLUTION INTRAMUSCULAR; INTRAVENOUS EVERY 8 HOURS
Status: DISCONTINUED | OUTPATIENT
Start: 2019-04-01 | End: 2019-04-02 | Stop reason: ALTCHOICE

## 2019-04-01 RX ADMIN — HYDROCODONE BITARTRATE AND ACETAMINOPHEN 2 TABLET: 5; 325 TABLET ORAL at 06:18

## 2019-04-01 RX ADMIN — PANTOPRAZOLE SODIUM 40 MG: 40 TABLET, DELAYED RELEASE ORAL at 06:18

## 2019-04-01 RX ADMIN — HYDROCODONE BITARTRATE AND ACETAMINOPHEN 2 TABLET: 5; 325 TABLET ORAL at 10:27

## 2019-04-01 RX ADMIN — HYDROCODONE BITARTRATE AND ACETAMINOPHEN 2 TABLET: 5; 325 TABLET ORAL at 02:19

## 2019-04-01 RX ADMIN — METOCLOPRAMIDE 10 MG: 5 INJECTION, SOLUTION INTRAMUSCULAR; INTRAVENOUS at 06:18

## 2019-04-01 RX ADMIN — IPRATROPIUM BROMIDE AND ALBUTEROL SULFATE 1 AMPULE: 2.5; .5 SOLUTION RESPIRATORY (INHALATION) at 11:19

## 2019-04-01 RX ADMIN — Medication 10 ML: at 20:44

## 2019-04-01 RX ADMIN — NITROFURANTOIN MONOHYDRATE/MACROCRYSTALLINE 100 MG: 25; 75 CAPSULE ORAL at 11:50

## 2019-04-01 RX ADMIN — LISINOPRIL 10 MG: 10 TABLET ORAL at 07:46

## 2019-04-01 RX ADMIN — ONDANSETRON 4 MG: 2 INJECTION INTRAMUSCULAR; INTRAVENOUS at 14:10

## 2019-04-01 RX ADMIN — Medication 10 ML: at 07:47

## 2019-04-01 RX ADMIN — ENOXAPARIN SODIUM 40 MG: 40 INJECTION SUBCUTANEOUS at 07:46

## 2019-04-01 RX ADMIN — METOCLOPRAMIDE 10 MG: 5 INJECTION, SOLUTION INTRAMUSCULAR; INTRAVENOUS at 10:27

## 2019-04-01 RX ADMIN — METOCLOPRAMIDE 10 MG: 5 INJECTION, SOLUTION INTRAMUSCULAR; INTRAVENOUS at 16:43

## 2019-04-01 RX ADMIN — KETOROLAC TROMETHAMINE 30 MG: 30 INJECTION, SOLUTION INTRAMUSCULAR; INTRAVENOUS at 16:43

## 2019-04-01 RX ADMIN — KETOROLAC TROMETHAMINE 30 MG: 30 INJECTION, SOLUTION INTRAMUSCULAR; INTRAVENOUS at 07:46

## 2019-04-01 RX ADMIN — FAMOTIDINE 40 MG: 20 TABLET ORAL at 20:45

## 2019-04-01 RX ADMIN — FAMOTIDINE 40 MG: 20 TABLET ORAL at 07:46

## 2019-04-01 RX ADMIN — HYDROCODONE BITARTRATE AND ACETAMINOPHEN 1 TABLET: 5; 325 TABLET ORAL at 20:45

## 2019-04-01 RX ADMIN — HYDROCHLOROTHIAZIDE 12.5 MG: 12.5 CAPSULE ORAL at 07:46

## 2019-04-01 RX ADMIN — HYDROCODONE BITARTRATE AND ACETAMINOPHEN 2 TABLET: 5; 325 TABLET ORAL at 14:45

## 2019-04-01 RX ADMIN — IPRATROPIUM BROMIDE AND ALBUTEROL SULFATE 1 AMPULE: 2.5; .5 SOLUTION RESPIRATORY (INHALATION) at 07:30

## 2019-04-01 ASSESSMENT — PAIN SCALES - GENERAL
PAINLEVEL_OUTOF10: 6
PAINLEVEL_OUTOF10: 0
PAINLEVEL_OUTOF10: 7
PAINLEVEL_OUTOF10: 6
PAINLEVEL_OUTOF10: 7
PAINLEVEL_OUTOF10: 7
PAINLEVEL_OUTOF10: 8
PAINLEVEL_OUTOF10: 7

## 2019-04-01 ASSESSMENT — PAIN DESCRIPTION - PAIN TYPE: TYPE: ACUTE PAIN

## 2019-04-01 NOTE — PLAN OF CARE
Problem: Falls - Risk of:  Goal: Will remain free from falls  Description  Will remain free from falls  3/31/2019 2344 by Stephanie Abraham RN  Outcome: Ongoing  Goal: Absence of physical injury  Description  Absence of physical injury  3/31/2019 2344 by Stephanie Abraham RN  Outcome: Ongoing     Problem: Pain:  Goal: Pain level will decrease  Description  Pain level will decrease  3/31/2019 2344 by Stephanie Abraham RN  Outcome: Ongoing  Goal: Control of acute pain  Description  Control of acute pain  3/31/2019 2344 by Stephanie Abraham RN  Outcome: Ongoing  Goal: Control of chronic pain  Description  Control of chronic pain  3/31/2019 2344 by Stephanie Abraham RN  Outcome: Ongoing     Problem: Breathing Pattern - Ineffective:  Goal: Ability to achieve and maintain a regular respiratory rate will improve  Description  Ability to achieve and maintain a regular respiratory rate will improve  3/31/2019 2344 by Stephanie Abraham RN  Outcome: Ongoing
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
## 2019-04-02 VITALS
SYSTOLIC BLOOD PRESSURE: 128 MMHG | BODY MASS INDEX: 38.15 KG/M2 | TEMPERATURE: 96.8 F | HEIGHT: 65 IN | DIASTOLIC BLOOD PRESSURE: 69 MMHG | HEART RATE: 83 BPM | RESPIRATION RATE: 16 BRPM | WEIGHT: 229 LBS | OXYGEN SATURATION: 93 %

## 2019-04-02 LAB
ANION GAP SERPL CALCULATED.3IONS-SCNC: 12 MMOL/L (ref 7–19)
BUN BLDV-MCNC: 7 MG/DL (ref 6–20)
CALCIUM SERPL-MCNC: 8.4 MG/DL (ref 8.6–10)
CHLORIDE BLD-SCNC: 103 MMOL/L (ref 98–111)
CO2: 26 MMOL/L (ref 22–29)
CREAT SERPL-MCNC: 0.8 MG/DL (ref 0.5–0.9)
EKG P AXIS: 29 DEGREES
EKG P-R INTERVAL: 156 MS
EKG Q-T INTERVAL: 374 MS
EKG QRS DURATION: 92 MS
EKG QTC CALCULATION (BAZETT): 409 MS
EKG T AXIS: 69 DEGREES
GFR NON-AFRICAN AMERICAN: >60
GLUCOSE BLD-MCNC: 85 MG/DL (ref 74–109)
ORGANISM: ABNORMAL
POTASSIUM SERPL-SCNC: 3.5 MMOL/L (ref 3.5–5)
SODIUM BLD-SCNC: 141 MMOL/L (ref 136–145)
TROPONIN: <0.01 NG/ML (ref 0–0.03)
URINE CULTURE, ROUTINE: ABNORMAL
URINE CULTURE, ROUTINE: ABNORMAL

## 2019-04-02 PROCEDURE — 6360000002 HC RX W HCPCS: Performed by: NURSE PRACTITIONER

## 2019-04-02 PROCEDURE — 6370000000 HC RX 637 (ALT 250 FOR IP): Performed by: SURGERY

## 2019-04-02 PROCEDURE — 99232 SBSQ HOSP IP/OBS MODERATE 35: CPT | Performed by: SURGERY

## 2019-04-02 PROCEDURE — 80048 BASIC METABOLIC PNL TOTAL CA: CPT

## 2019-04-02 PROCEDURE — 6360000002 HC RX W HCPCS: Performed by: SURGERY

## 2019-04-02 PROCEDURE — 36415 COLL VENOUS BLD VENIPUNCTURE: CPT

## 2019-04-02 PROCEDURE — 84484 ASSAY OF TROPONIN QUANT: CPT

## 2019-04-02 PROCEDURE — 93005 ELECTROCARDIOGRAM TRACING: CPT

## 2019-04-02 PROCEDURE — 94640 AIRWAY INHALATION TREATMENT: CPT

## 2019-04-02 RX ORDER — NITROFURANTOIN 25; 75 MG/1; MG/1
100 CAPSULE ORAL EVERY 12 HOURS SCHEDULED
Qty: 14 CAPSULE | Refills: 0 | Status: SHIPPED | OUTPATIENT
Start: 2019-04-02 | End: 2019-04-09 | Stop reason: ALTCHOICE

## 2019-04-02 RX ORDER — HYDROCODONE BITARTRATE AND ACETAMINOPHEN 5; 325 MG/1; MG/1
1 TABLET ORAL EVERY 4 HOURS PRN
Qty: 35 TABLET | Refills: 0 | Status: SHIPPED | OUTPATIENT
Start: 2019-04-02 | End: 2019-04-09

## 2019-04-02 RX ORDER — IBUPROFEN 400 MG/1
400 TABLET ORAL
Qty: 120 TABLET | Refills: 3 | COMMUNITY
Start: 2019-04-02 | End: 2021-06-29

## 2019-04-02 RX ORDER — LIDOCAINE 4 G/G
1 PATCH TOPICAL DAILY
Qty: 5 PATCH | Refills: 2 | Status: SHIPPED | OUTPATIENT
Start: 2019-04-03 | End: 2019-05-07 | Stop reason: ALTCHOICE

## 2019-04-02 RX ORDER — PROMETHAZINE HYDROCHLORIDE 12.5 MG/1
12.5 TABLET ORAL EVERY 6 HOURS PRN
Qty: 15 TABLET | Refills: 1 | Status: SHIPPED | OUTPATIENT
Start: 2019-04-02 | End: 2019-04-09

## 2019-04-02 RX ORDER — PROMETHAZINE HYDROCHLORIDE 25 MG/1
12.5 TABLET ORAL EVERY 6 HOURS PRN
Status: DISCONTINUED | OUTPATIENT
Start: 2019-04-02 | End: 2019-04-02 | Stop reason: HOSPADM

## 2019-04-02 RX ORDER — IBUPROFEN 400 MG/1
400 TABLET ORAL
Status: DISCONTINUED | OUTPATIENT
Start: 2019-04-02 | End: 2019-04-02 | Stop reason: HOSPADM

## 2019-04-02 RX ORDER — CYCLOBENZAPRINE HCL 5 MG
5 TABLET ORAL 3 TIMES DAILY PRN
Qty: 30 TABLET | Refills: 0 | Status: SHIPPED | OUTPATIENT
Start: 2019-04-02 | End: 2019-04-12

## 2019-04-02 RX ADMIN — KETOROLAC TROMETHAMINE 30 MG: 30 INJECTION, SOLUTION INTRAMUSCULAR; INTRAVENOUS at 00:06

## 2019-04-02 RX ADMIN — METOCLOPRAMIDE 10 MG: 5 INJECTION, SOLUTION INTRAMUSCULAR; INTRAVENOUS at 06:27

## 2019-04-02 RX ADMIN — FAMOTIDINE 40 MG: 20 TABLET ORAL at 10:03

## 2019-04-02 RX ADMIN — PROMETHAZINE HYDROCHLORIDE 12.5 MG: 25 TABLET ORAL at 11:02

## 2019-04-02 RX ADMIN — IPRATROPIUM BROMIDE AND ALBUTEROL SULFATE 1 AMPULE: 2.5; .5 SOLUTION RESPIRATORY (INHALATION) at 11:34

## 2019-04-02 RX ADMIN — HYDROCODONE BITARTRATE AND ACETAMINOPHEN 1 TABLET: 5; 325 TABLET ORAL at 08:39

## 2019-04-02 RX ADMIN — NITROFURANTOIN MONOHYDRATE/MACROCRYSTALLINE 100 MG: 25; 75 CAPSULE ORAL at 10:03

## 2019-04-02 RX ADMIN — LISINOPRIL 10 MG: 10 TABLET ORAL at 10:02

## 2019-04-02 RX ADMIN — PANTOPRAZOLE SODIUM 40 MG: 40 TABLET, DELAYED RELEASE ORAL at 10:03

## 2019-04-02 RX ADMIN — HYDROCHLOROTHIAZIDE 12.5 MG: 12.5 CAPSULE ORAL at 10:40

## 2019-04-02 RX ADMIN — ENOXAPARIN SODIUM 40 MG: 40 INJECTION SUBCUTANEOUS at 10:02

## 2019-04-02 RX ADMIN — IBUPROFEN 400 MG: 400 TABLET ORAL at 11:02

## 2019-04-02 ASSESSMENT — PAIN SCALES - GENERAL
PAINLEVEL_OUTOF10: 7
PAINLEVEL_OUTOF10: 6
PAINLEVEL_OUTOF10: 7
PAINLEVEL_OUTOF10: 7

## 2019-04-02 ASSESSMENT — PAIN DESCRIPTION - LOCATION: LOCATION: BACK

## 2019-04-02 NOTE — DISCHARGE INSTR - ACTIVITY
Activity as tolerated. Make sure to take deep breaths and cough every two hours. No driving if taking pain medication.

## 2019-04-02 NOTE — PROGRESS NOTES
Physical Therapy    Evaluation attempted, patient states that she is being discharged home.  She states that she has been up independently without difficulty    Electronically signed by Renea Hawkins PT on 4/2/2019 at 2:05 PM

## 2019-04-08 ENCOUNTER — APPOINTMENT (OUTPATIENT)
Dept: GENERAL RADIOLOGY | Age: 54
End: 2019-04-08
Payer: COMMERCIAL

## 2019-04-08 ENCOUNTER — HOSPITAL ENCOUNTER (EMERGENCY)
Age: 54
Discharge: HOME | End: 2019-04-09
Attending: EMERGENCY MEDICINE
Payer: COMMERCIAL

## 2019-04-08 VITALS
TEMPERATURE: 98 F | HEART RATE: 112 BPM | HEIGHT: 65 IN | RESPIRATION RATE: 16 BRPM | BODY MASS INDEX: 38.32 KG/M2 | OXYGEN SATURATION: 96 % | WEIGHT: 230 LBS | SYSTOLIC BLOOD PRESSURE: 130 MMHG | DIASTOLIC BLOOD PRESSURE: 79 MMHG

## 2019-04-08 DIAGNOSIS — S22.43XD MULTIPLE CLOSED FRACTURES OF RIBS OF BOTH SIDES WITH ROUTINE HEALING, SUBSEQUENT ENCOUNTER: ICD-10-CM

## 2019-04-08 DIAGNOSIS — S30.1XXD HEMATOMA OF ABDOMINAL WALL, SUBSEQUENT ENCOUNTER: ICD-10-CM

## 2019-04-08 DIAGNOSIS — R07.81 RIB PAIN ON RIGHT SIDE: Primary | ICD-10-CM

## 2019-04-08 LAB
ALBUMIN SERPL-MCNC: 3.9 G/DL (ref 3.5–5.2)
ALP BLD-CCNC: 136 U/L (ref 35–104)
ALT SERPL-CCNC: 17 U/L (ref 5–33)
ANION GAP SERPL CALCULATED.3IONS-SCNC: 12 MMOL/L (ref 7–19)
AST SERPL-CCNC: 19 U/L (ref 5–32)
BASOPHILS ABSOLUTE: 0.1 K/UL (ref 0–0.2)
BASOPHILS RELATIVE PERCENT: 0.6 % (ref 0–1)
BILIRUB SERPL-MCNC: 0.5 MG/DL (ref 0.2–1.2)
BUN BLDV-MCNC: 9 MG/DL (ref 6–20)
CALCIUM SERPL-MCNC: 9.4 MG/DL (ref 8.6–10)
CHLORIDE BLD-SCNC: 98 MMOL/L (ref 98–111)
CO2: 26 MMOL/L (ref 22–29)
CREAT SERPL-MCNC: 0.7 MG/DL (ref 0.5–0.9)
EOSINOPHILS ABSOLUTE: 0.2 K/UL (ref 0–0.6)
EOSINOPHILS RELATIVE PERCENT: 2.8 % (ref 0–5)
GFR NON-AFRICAN AMERICAN: >60
GLUCOSE BLD-MCNC: 97 MG/DL (ref 74–109)
HCT VFR BLD CALC: 33 % (ref 37–47)
HEMOGLOBIN: 10.5 G/DL (ref 12–16)
LYMPHOCYTES ABSOLUTE: 2.3 K/UL (ref 1.1–4.5)
LYMPHOCYTES RELATIVE PERCENT: 26.7 % (ref 20–40)
MCH RBC QN AUTO: 30.5 PG (ref 27–31)
MCHC RBC AUTO-ENTMCNC: 31.8 G/DL (ref 33–37)
MCV RBC AUTO: 95.9 FL (ref 81–99)
MONOCYTES ABSOLUTE: 0.6 K/UL (ref 0–0.9)
MONOCYTES RELATIVE PERCENT: 6.7 % (ref 0–10)
NEUTROPHILS ABSOLUTE: 5.4 K/UL (ref 1.5–7.5)
NEUTROPHILS RELATIVE PERCENT: 62.6 % (ref 50–65)
PDW BLD-RTO: 14.6 % (ref 11.5–14.5)
PLATELET # BLD: 442 K/UL (ref 130–400)
PMV BLD AUTO: 9.7 FL (ref 9.4–12.3)
POTASSIUM REFLEX MAGNESIUM: 4.1 MMOL/L (ref 3.5–5)
RBC # BLD: 3.44 M/UL (ref 4.2–5.4)
SODIUM BLD-SCNC: 136 MMOL/L (ref 136–145)
TOTAL PROTEIN: 7.7 G/DL (ref 6.6–8.7)
WBC # BLD: 8.6 K/UL (ref 4.8–10.8)

## 2019-04-08 PROCEDURE — 96374 THER/PROPH/DIAG INJ IV PUSH: CPT

## 2019-04-08 PROCEDURE — 85025 COMPLETE CBC W/AUTO DIFF WBC: CPT

## 2019-04-08 PROCEDURE — 93005 ELECTROCARDIOGRAM TRACING: CPT

## 2019-04-08 PROCEDURE — 80053 COMPREHEN METABOLIC PANEL: CPT

## 2019-04-08 PROCEDURE — 99285 EMERGENCY DEPT VISIT HI MDM: CPT

## 2019-04-08 PROCEDURE — 96375 TX/PRO/DX INJ NEW DRUG ADDON: CPT

## 2019-04-08 PROCEDURE — 36415 COLL VENOUS BLD VENIPUNCTURE: CPT

## 2019-04-08 PROCEDURE — 6360000002 HC RX W HCPCS: Performed by: EMERGENCY MEDICINE

## 2019-04-08 PROCEDURE — 71046 X-RAY EXAM CHEST 2 VIEWS: CPT

## 2019-04-08 RX ORDER — ONDANSETRON 2 MG/ML
4 INJECTION INTRAMUSCULAR; INTRAVENOUS ONCE
Status: COMPLETED | OUTPATIENT
Start: 2019-04-08 | End: 2019-04-08

## 2019-04-08 RX ADMIN — ONDANSETRON 4 MG: 2 INJECTION INTRAMUSCULAR; INTRAVENOUS at 22:41

## 2019-04-08 RX ADMIN — HYDROMORPHONE HYDROCHLORIDE 0.5 MG: 1 INJECTION, SOLUTION INTRAMUSCULAR; INTRAVENOUS; SUBCUTANEOUS at 22:42

## 2019-04-08 ASSESSMENT — PAIN SCALES - GENERAL: PAINLEVEL_OUTOF10: 8

## 2019-04-09 ENCOUNTER — OFFICE VISIT (OUTPATIENT)
Dept: FAMILY MEDICINE CLINIC | Age: 54
End: 2019-04-09
Payer: OTHER MISCELLANEOUS

## 2019-04-09 VITALS
OXYGEN SATURATION: 98 % | RESPIRATION RATE: 20 BRPM | DIASTOLIC BLOOD PRESSURE: 70 MMHG | TEMPERATURE: 98.8 F | WEIGHT: 223.38 LBS | SYSTOLIC BLOOD PRESSURE: 108 MMHG | BODY MASS INDEX: 37.22 KG/M2 | HEIGHT: 65 IN | HEART RATE: 83 BPM

## 2019-04-09 DIAGNOSIS — D75.839 THROMBOCYTOSIS: ICD-10-CM

## 2019-04-09 DIAGNOSIS — S22.49XS CLOSED FRACTURE OF MULTIPLE RIBS, UNSPECIFIED LATERALITY, SEQUELA: ICD-10-CM

## 2019-04-09 DIAGNOSIS — Z09 HOSPITAL DISCHARGE FOLLOW-UP: Primary | ICD-10-CM
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
## 2019-04-09 DIAGNOSIS — D64.9 HEMOGLOBIN LOW: ICD-10-CM

## 2019-04-09 PROCEDURE — 99214 OFFICE O/P EST MOD 30 MIN: CPT | Performed by: NURSE PRACTITIONER

## 2019-04-09 PROCEDURE — 6360000002 HC RX W HCPCS: Performed by: EMERGENCY MEDICINE

## 2019-04-09 PROCEDURE — 96376 TX/PRO/DX INJ SAME DRUG ADON: CPT

## 2019-04-09 PROCEDURE — 99284 EMERGENCY DEPT VISIT MOD MDM: CPT | Performed by: EMERGENCY MEDICINE

## 2019-04-09 PROCEDURE — 1111F DSCHRG MED/CURRENT MED MERGE: CPT | Performed by: NURSE PRACTITIONER

## 2019-04-09 RX ORDER — OXYCODONE AND ACETAMINOPHEN 7.5; 325 MG/1; MG/1
1 TABLET ORAL EVERY 6 HOURS PRN
Qty: 12 TABLET | Refills: 0 | Status: SHIPPED | OUTPATIENT
Start: 2019-04-09 | End: 2019-04-12

## 2019-04-09 RX ADMIN — HYDROMORPHONE HYDROCHLORIDE 0.5 MG: 1 INJECTION, SOLUTION INTRAMUSCULAR; INTRAVENOUS; SUBCUTANEOUS at 00:00

## 2019-04-09 ASSESSMENT — ENCOUNTER SYMPTOMS
NAUSEA: 0
SORE THROAT: 0
SHORTNESS OF BREATH: 1
DIARRHEA: 0
SINUS PRESSURE: 0
FACIAL SWELLING: 0
COUGH: 0
CHOKING: 0
CONSTIPATION: 0
APNEA: 0
EYE DISCHARGE: 0
WHEEZING: 0
BLOOD IN STOOL: 0
VOICE CHANGE: 0
ABDOMINAL PAIN: 1

## 2019-04-09 ASSESSMENT — PAIN SCALES - GENERAL: PAINLEVEL_OUTOF10: 8

## 2019-04-09 NOTE — ED NOTES
Bed: 03  Expected date:   Expected time:   Means of arrival: Car  Comments:     Hang Benitez RN  04/08/19 [id number]
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
## 2019-04-09 NOTE — LETTER
[place] [address]  Phone: [phone]  Fax: [phone]    MICHELE Fierro        April 9, 2019     Patient: Palmira Person   YOB: 1965   Date of Visit: 4/9/2019       To Whom It May Concern: It is my medical opinion that Palmira Person may return to work on ---this will be further determined based upon healing. This process will likely take at least 8wks. .    If you have any questions or concerns, please don't hesitate to call.     Sincerely,        MICHELE Fierro

## 2019-04-10 LAB
EKG P AXIS: 32 DEGREES
EKG P-R INTERVAL: 164 MS
EKG Q-T INTERVAL: 346 MS
EKG QRS DURATION: 90 MS
EKG QTC CALCULATION (BAZETT): 410 MS
EKG T AXIS: 21 DEGREES

## 2019-04-14 ASSESSMENT — ENCOUNTER SYMPTOMS
DIARRHEA: 0
COLOR CHANGE: 1
VOMITING: 0
CONSTIPATION: 0

## 2019-04-15 NOTE — DISCHARGE SUMMARY
Physician Discharge Summary     Patient ID:  Brooks Spence  [id number]  20 y.o.  1965    Admit date: 3/30/2019    Discharge date and time: 4/2/2019  4:13 PM     Admitting Physician: Chen Guzman MD     Discharge Physician: Chen Guzman      Admission Diagnoses: Multiple rib fractures involving first rib [S22.49XA]  Closed fracture of rib of right side [S22.31XA]   Small pneumothorax    Discharge Diagnoses: same, with UTI    Admission Condition: fair    Discharged Condition: stable    Indication for Admission: Severe pain associated with above injuries, need for pain control and pulmonary toilet, monitoring of pneumothorax    Hospital Course: The patient was admitted and given pain and nausea meds. There was concern of enlargement on follow up Xray of her pneumothorax, so CT surgery was consulted. She was found to also have a UTI. Her pain was gradually improved, and her activity increased with the help of therapy. She was then ready to be discharged home. Consults: CT surgery    Significant Diagnostic Studies: labs and radiology    Treatments: IV hydration, antibiotics, analgesia, respiratory therapy and therapies: PT and OT    Discharge Exam:  See progress note    Disposition: home    In process/preliminary results:  Outstanding Order Results     No orders found from 3/1/2019 to 3/31/2019. Patient Instructions:   Discharge Medication List as of 4/2/2019  1:33 PM      START taking these medications    Details   HYDROcodone-acetaminophen (NORCO) 5-325 MG per tablet Take 1 tablet by mouth every 4 hours as needed for Pain for up to 7 days.  For acute pain due to rib fractures, Disp-35 tablet, R-0Print      ibuprofen (ADVIL;MOTRIN) 400 MG tablet Take 1 tablet by mouth 3 times daily (with meals), Disp-120 tablet, R-3OTC      promethazine (PHENERGAN) 12.5 MG tablet Take 1 tablet by mouth every 6 hours as needed for Nausea, Disp-15 tablet, R-1Print      lidocaine 4 % external patch Place 1 patch onto the skin daily, Transdermal, DAILY Starting Wed 4/3/2019, Disp-5 patch, R-2, Print      cyclobenzaprine (FLEXERIL) 5 MG tablet Take 1 tablet by mouth 3 times daily as needed for Muscle spasms, Disp-30 tablet, R-0Print      nitrofurantoin, macrocrystal-monohydrate, (MACROBID) 100 MG capsule Take 1 capsule by mouth every 12 hours for 7 days, Disp-14 capsule, R-0Print         CONTINUE these medications which have NOT CHANGED    Details   ondansetron (ZOFRAN ODT) 4 MG disintegrating tablet Take 1 tablet by mouth every 8 hours as needed for Nausea or Vomiting, Disp-12 tablet, R-0Print      lisinopril-hydrochlorothiazide (PRINZIDE;ZESTORETIC) 10-12.5 MG per tablet Take 1 tablet by mouth daily, Disp-90 tablet, R-1Normal      NEXIUM 40 MG delayed release capsule Take 1 capsule by mouth every morning (before breakfast), Disp-90 capsule, R-1, DAWNormal      hydrochlorothiazide (MICROZIDE) 12.5 MG capsule 1-2 po daily for bp/fluid MAY TAKE WITH lisinopril/hctz, Disp-180 capsule, R-1Normal      butalbital-acetaminophen-caffeine (FIORICET, ESGIC) -40 MG per tablet Take 1 tablet by mouth every 4 hours as needed for Headaches, Disp-60 tablet, R-0Print      ranitidine (ZANTAC) 300 MG tablet Take 300 mg by mouth 2 times daily      Respiratory Therapy Supplies DANA Disp-1 Device, R-0, NormalRenew cpap and supplies for continuation of HS use. CPAP Machine MISC Starting 10/1/2016, Until Discontinued, Disp-1 each, R-0, Print           Activity: activity as tolerated, no driving while on analgesics and no heavy lifting for 2 weeks  Diet: regular diet  Wound Care: none needed    Follow-up with Pancho Ricardo in 2 weeks. Follow up with PCP in 1-2 weeks.     Signed:  Pancho Ricardo    4/15/2019  11:25 AM

## 2019-04-16 DIAGNOSIS — Z09 HOSPITAL DISCHARGE FOLLOW-UP: ICD-10-CM

## 2019-04-16 LAB
BASOPHILS ABSOLUTE: 0.1 K/UL (ref 0–0.2)
BASOPHILS RELATIVE PERCENT: 1.6 % (ref 0–1)
EOSINOPHILS ABSOLUTE: 0.2 K/UL (ref 0–0.6)
EOSINOPHILS RELATIVE PERCENT: 3.4 % (ref 0–5)
HCT VFR BLD CALC: 37.1 % (ref 37–47)
HEMOGLOBIN: 11.1 G/DL (ref 12–16)
LYMPHOCYTES ABSOLUTE: 1.8 K/UL (ref 1.1–4.5)
LYMPHOCYTES RELATIVE PERCENT: 33.4 % (ref 20–40)
MCH RBC QN AUTO: 29 PG (ref 27–31)
MCHC RBC AUTO-ENTMCNC: 29.9 G/DL (ref 33–37)
MCV RBC AUTO: 96.9 FL (ref 81–99)
MONOCYTES ABSOLUTE: 0.4 K/UL (ref 0–0.9)
MONOCYTES RELATIVE PERCENT: 8 % (ref 0–10)
NEUTROPHILS ABSOLUTE: 2.9 K/UL (ref 1.5–7.5)
NEUTROPHILS RELATIVE PERCENT: 53.4 % (ref 50–65)
PDW BLD-RTO: 14.3 % (ref 11.5–14.5)
PLATELET # BLD: 546 K/UL (ref 130–400)
PMV BLD AUTO: 9.4 FL (ref 9.4–12.3)
RBC # BLD: 3.83 M/UL (ref 4.2–5.4)
WBC # BLD: 5.5 K/UL (ref 4.8–10.8)

## 2019-04-18 ENCOUNTER — OFFICE VISIT (OUTPATIENT)
Dept: SURGERY | Age: 54
End: 2019-04-18
Payer: COMMERCIAL

## 2019-04-18 VITALS
TEMPERATURE: 97.8 F | HEIGHT: 65 IN | WEIGHT: 218.6 LBS | DIASTOLIC BLOOD PRESSURE: 74 MMHG | BODY MASS INDEX: 36.42 KG/M2 | SYSTOLIC BLOOD PRESSURE: 120 MMHG

## 2019-04-18 DIAGNOSIS — S22.43XD MULTIPLE CLOSED FRACTURES OF RIBS OF BOTH SIDES WITH ROUTINE HEALING, SUBSEQUENT ENCOUNTER: ICD-10-CM
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
## 2019-04-18 PROCEDURE — 99212 OFFICE O/P EST SF 10 MIN: CPT | Performed by: SURGERY

## 2019-04-30 ENCOUNTER — OFFICE VISIT (OUTPATIENT)
Dept: SURGERY | Age: 54
End: 2019-04-30
Payer: COMMERCIAL

## 2019-04-30 VITALS
HEIGHT: 65 IN | TEMPERATURE: 97.1 F | WEIGHT: 215.6 LBS | OXYGEN SATURATION: 99 % | BODY MASS INDEX: 35.92 KG/M2 | HEART RATE: 65 BPM
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
## 2019-04-30 PROCEDURE — 99213 OFFICE O/P EST LOW 20 MIN: CPT | Performed by: PHYSICIAN ASSISTANT

## 2019-05-06 ENCOUNTER — TELEPHONE (OUTPATIENT)
Dept: FAMILY MEDICINE CLINIC | Age: 54
End: 2019-05-06
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
## 2019-05-07 ENCOUNTER — HOSPITAL ENCOUNTER (EMERGENCY)
Age: 54
Discharge: HOME | End: 2019-05-07
Attending: EMERGENCY MEDICINE
Payer: COMMERCIAL

## 2019-05-07 ENCOUNTER — APPOINTMENT (OUTPATIENT)
Dept: CT IMAGING | Age: 54
End: 2019-05-07
Payer: COMMERCIAL

## 2019-05-07 VITALS
SYSTOLIC BLOOD PRESSURE: 144 MMHG | HEIGHT: 65 IN | BODY MASS INDEX: 35.82 KG/M2 | TEMPERATURE: 97.4 F | OXYGEN SATURATION: 96 % | DIASTOLIC BLOOD PRESSURE: 91 MMHG | HEART RATE: 68 BPM | RESPIRATION RATE: 17 BRPM | WEIGHT: 215 LBS

## 2019-05-07 DIAGNOSIS — R51.9 ACUTE NONINTRACTABLE HEADACHE, UNSPECIFIED HEADACHE TYPE: ICD-10-CM

## 2019-05-07 DIAGNOSIS — R42 VERTIGO: ICD-10-CM

## 2019-05-07 DIAGNOSIS — F07.81 POSTCONCUSSIVE SYNDROME: Primary | ICD-10-CM

## 2019-05-07 LAB
ANION GAP SERPL CALCULATED.3IONS-SCNC: 14 MMOL/L (ref 7–19)
APTT: 27.1 SEC (ref 26–36.2)
BASOPHILS ABSOLUTE: 0.1 K/UL (ref 0–0.2)
BASOPHILS RELATIVE PERCENT: 1.1 % (ref 0–1)
BUN BLDV-MCNC: 15 MG/DL (ref 6–20)
CALCIUM SERPL-MCNC: 9.6 MG/DL (ref 8.6–10)
CHLORIDE BLD-SCNC: 101 MMOL/L (ref 98–111)
CO2: 24 MMOL/L (ref 22–29)
CREAT SERPL-MCNC: 0.7 MG/DL (ref 0.5–0.9)
EOSINOPHILS ABSOLUTE: 0.1 K/UL (ref 0–0.6)
EOSINOPHILS RELATIVE PERCENT: 1.4 % (ref 0–5)
GFR NON-AFRICAN AMERICAN: >60
GLUCOSE BLD-MCNC: 88 MG/DL (ref 74–109)
HCT VFR BLD CALC: 41.2 % (ref 37–47)
HEMOGLOBIN: 13.4 G/DL (ref 12–16)
INR BLD: 1.04 (ref 0.88–1.18)
LYMPHOCYTES ABSOLUTE: 2.5 K/UL (ref 1.1–4.5)
LYMPHOCYTES RELATIVE PERCENT: 35.3 % (ref 20–40)
MCH RBC QN AUTO: 30.7 PG (ref 27–31)
MCHC RBC AUTO-ENTMCNC: 32.5 G/DL (ref 33–37)
MCV RBC AUTO: 94.3 FL (ref 81–99)
MONOCYTES ABSOLUTE: 0.5 K/UL (ref 0–0.9)
MONOCYTES RELATIVE PERCENT: 7.2 % (ref 0–10)
NEUTROPHILS ABSOLUTE: 3.9 K/UL (ref 1.5–7.5)
NEUTROPHILS RELATIVE PERCENT: 54.9 % (ref 50–65)
PDW BLD-RTO: 13.5 % (ref 11.5–14.5)
PLATELET # BLD: 313 K/UL (ref 130–400)
PMV BLD AUTO: 9.6 FL (ref 9.4–12.3)
POTASSIUM REFLEX MAGNESIUM: 3.7 MMOL/L (ref 3.5–5)
PROTHROMBIN TIME: 13 SEC (ref 12–14.6)
RBC # BLD: 4.37 M/UL (ref 4.2–5.4)
REASON FOR REJECTION: NORMAL
REJECTED TEST: NORMAL
SODIUM BLD-SCNC: 139 MMOL/L (ref 136–145)
WBC # BLD: 7.1 K/UL (ref 4.8–10.8)

## 2019-05-07 PROCEDURE — 6360000002 HC RX W HCPCS: Performed by: EMERGENCY MEDICINE

## 2019-05-07 PROCEDURE — 70450 CT HEAD/BRAIN W/O DYE: CPT

## 2019-05-07 PROCEDURE — 93005 ELECTROCARDIOGRAM TRACING: CPT

## 2019-05-07 PROCEDURE — 99284 EMERGENCY DEPT VISIT MOD MDM: CPT

## 2019-05-07 PROCEDURE — 85730 THROMBOPLASTIN TIME PARTIAL: CPT

## 2019-05-07 PROCEDURE — 36415 COLL VENOUS BLD VENIPUNCTURE: CPT

## 2019-05-07 PROCEDURE — 99284 EMERGENCY DEPT VISIT MOD MDM: CPT | Performed by: EMERGENCY MEDICINE

## 2019-05-07 PROCEDURE — 85025 COMPLETE CBC W/AUTO DIFF WBC: CPT

## 2019-05-07 PROCEDURE — 96375 TX/PRO/DX INJ NEW DRUG ADDON: CPT

## 2019-05-07 PROCEDURE — 96374 THER/PROPH/DIAG INJ IV PUSH: CPT

## 2019-05-07 PROCEDURE — 6370000000 HC RX 637 (ALT 250 FOR IP): Performed by: EMERGENCY MEDICINE

## 2019-05-07 PROCEDURE — 85610 PROTHROMBIN TIME: CPT

## 2019-05-07 PROCEDURE — 80048 BASIC METABOLIC PNL TOTAL CA: CPT

## 2019-05-07 PROCEDURE — 2580000003 HC RX 258: Performed by: EMERGENCY MEDICINE

## 2019-05-07 RX ORDER — DIAZEPAM 5 MG/1
5 TABLET ORAL ONCE
Status: COMPLETED | OUTPATIENT
Start: 2019-05-07 | End: 2019-05-07

## 2019-05-07 RX ORDER — DIAZEPAM 5 MG/1
5 TABLET ORAL EVERY 8 HOURS PRN
Qty: 15 TABLET | Refills: 0 | Status: SHIPPED | OUTPATIENT
Start: 2019-05-07 | End: 2019-05-17

## 2019-05-07 RX ORDER — 0.9 % SODIUM CHLORIDE 0.9 %
1000 INTRAVENOUS SOLUTION INTRAVENOUS ONCE
Status: COMPLETED | OUTPATIENT
Start: 2019-05-07 | End: 2019-05-07

## 2019-05-07 RX ORDER — DEXAMETHASONE SODIUM PHOSPHATE 10 MG/ML
10 INJECTION, SOLUTION INTRAMUSCULAR; INTRAVENOUS ONCE
Status: COMPLETED | OUTPATIENT
Start: 2019-05-07 | End: 2019-05-07

## 2019-05-07 RX ORDER — METOCLOPRAMIDE HYDROCHLORIDE 5 MG/ML
10 INJECTION INTRAMUSCULAR; INTRAVENOUS ONCE
Status: COMPLETED | OUTPATIENT
Start: 2019-05-07 | End: 2019-05-07

## 2019-05-07 RX ORDER — KETOROLAC TROMETHAMINE 30 MG/ML
15 INJECTION, SOLUTION INTRAMUSCULAR; INTRAVENOUS ONCE
Status: COMPLETED | OUTPATIENT
Start: 2019-05-07 | End: 2019-05-07

## 2019-05-07 RX ADMIN — KETOROLAC TROMETHAMINE 15 MG: 30 INJECTION, SOLUTION INTRAMUSCULAR; INTRAVENOUS at 19:41

## 2019-05-07 RX ADMIN — DEXAMETHASONE SODIUM PHOSPHATE 10 MG: 10 INJECTION INTRAMUSCULAR; INTRAVENOUS at 19:42

## 2019-05-07 RX ADMIN — SODIUM CHLORIDE 1000 ML: 9 INJECTION, SOLUTION INTRAVENOUS at 18:17

## 2019-05-07 RX ADMIN — DIAZEPAM 5 MG: 5 TABLET ORAL at 18:06

## 2019-05-07 RX ADMIN — METOCLOPRAMIDE 10 MG: 5 INJECTION, SOLUTION INTRAMUSCULAR; INTRAVENOUS at 07:00

## 2019-05-07 ASSESSMENT — ENCOUNTER SYMPTOMS
SHORTNESS OF BREATH: 0
RHINORRHEA: 0
NAUSEA: 1
DIARRHEA: 0
BACK PAIN: 0
CHEST TIGHTNESS: 0
COUGH: 0
SORE THROAT: 0
PHOTOPHOBIA: 0
EYE PAIN: 0
VOMITING: 1
ABDOMINAL PAIN: 0

## 2019-05-07 ASSESSMENT — PAIN DESCRIPTION - LOCATION: LOCATION: HEAD

## 2019-05-07 ASSESSMENT — PAIN SCALES - GENERAL
PAINLEVEL_OUTOF10: 5
PAINLEVEL_OUTOF10: 7
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
## 2019-05-08 ENCOUNTER — TELEPHONE (OUTPATIENT)
Dept: NEUROSURGERY | Age: 54
End: 2019-05-08

## 2019-05-08 NOTE — TELEPHONE ENCOUNTER
Pt is needing an appt with Dr Odalys Broussard. Ref by [facility] ER for Postconcussive syndrome. Needs sooner than [facility] has. Please call the pt.

## 2019-05-09 ENCOUNTER — TELEPHONE (OUTPATIENT)
Dept: NEUROSURGERY | Age: 54
End: 2019-05-09

## 2019-05-09 NOTE — TELEPHONE ENCOUNTER
Called patient and left voicemail with appointment date and time, have sent letter also.  05-09-19 cp

## 2019-05-10 LAB
EKG P AXIS: 26 DEGREES
EKG P-R INTERVAL: 150 MS
EKG Q-T INTERVAL: 390 MS
EKG QRS DURATION: 94 MS
EKG QTC CALCULATION (BAZETT): 390 MS
EKG T AXIS: 13 DEGREES

## 2019-05-13 ENCOUNTER — TELEPHONE (OUTPATIENT)
Dept: FAMILY MEDICINE CLINIC | Age: 54
End: 2019-05-13

## 2019-05-13 NOTE — TELEPHONE ENCOUNTER
Patient called, said she believes she has bowel obstruction, she has been constipated and has been taking stool softeners regularly and is unable to have bowel movement, and has had vomiting. She wanted to know if she could walk in for a CT, I explained to her that she would have to be seen and CT would have to be approved through insurance. She said she did not feel as if she could wait to be seen and felt CT was urgent. She is going to the ER.

## 2019-05-14 ENCOUNTER — OFFICE VISIT (OUTPATIENT)
Dept: SURGERY | Age: 54
End: 2019-05-14
Payer: OTHER MISCELLANEOUS

## 2019-05-14 VITALS
HEIGHT: 65 IN | HEART RATE: 110 BPM | OXYGEN SATURATION: 99 % | BODY MASS INDEX: 35.22 KG/M2 | WEIGHT: 211.4 LBS | TEMPERATURE: 97.1 F
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
## 2019-05-14 PROCEDURE — 99212 OFFICE O/P EST SF 10 MIN: CPT | Performed by: PHYSICIAN ASSISTANT

## 2019-05-17 ENCOUNTER — OFFICE VISIT (OUTPATIENT)
Dept: NEUROLOGY | Age: 54
End: 2019-05-17
Payer: OTHER MISCELLANEOUS

## 2019-05-17 VITALS
WEIGHT: 215 LBS | HEIGHT: 65 IN | DIASTOLIC BLOOD PRESSURE: 89 MMHG | BODY MASS INDEX: 35.82 KG/M2 | HEART RATE: 71 BPM | SYSTOLIC BLOOD PRESSURE: 132 MMHG

## 2019-05-17 DIAGNOSIS — R42 DIZZY: ICD-10-CM

## 2019-05-17 DIAGNOSIS — G44.89 OTHER HEADACHE SYNDROME: Primary | ICD-10-CM

## 2019-05-17 DIAGNOSIS — M54.81 OCCIPITAL NEURALGIA OF RIGHT SIDE: ICD-10-CM

## 2019-05-17 DIAGNOSIS — S22.41XA CLOSED FRACTURE OF MULTIPLE RIBS OF RIGHT SIDE, INITIAL ENCOUNTER: ICD-10-CM

## 2019-05-17 PROCEDURE — 64405 NJX AA&/STRD GR OCPL NRV: CPT | Performed by: PSYCHIATRY & NEUROLOGY

## 2019-05-17 PROCEDURE — 99204 OFFICE O/P NEW MOD 45 MIN: CPT | Performed by: PSYCHIATRY & NEUROLOGY

## 2019-05-17 RX ORDER — PREDNISONE 10 MG/1
TABLET ORAL
Qty: 11 TABLET | Refills: 0 | Status: SHIPPED | OUTPATIENT
Start: 2019-05-17 | End: 2019-08-09 | Stop reason: ALTCHOICE

## 2019-05-17 NOTE — PROGRESS NOTES
REVIEW OF SYSTEMS    Constitutional: []Fever []Sweats []Chills [] Recent Injury   [] Denies all unless marked  HENT:[]Headache  [] Head Injury  [] Sore Throat  [] Ear Pain  [x] Dizziness [] Hearing Loss   [] Denies all unless marked  Spine:  [x] Neck pain  [x] Back pain  [] Sciaticia  [] Denies all unless marked  Cardiovascular:[]Chest Pain []Palpitations [] Heart Disease  [x] Denies all unless marked  Pulmonary: []Shortness of Breath []Cough   [x] Denies all unless marked  Gastrointestinal:  []Abdominal Pain  []Blood in Stool  []Diarrhea [x]Constipation [x]Nausea  [x]Vomiting  [] Denies all unless marked  Genitourinary:  [] Dysuria [] Frequency  [] Incontinence [] Urgency   [x] Denies all unless marked  Musculoskeletal: [] Arthralgia  [] Myalgias [] Muscle cramps  [] Muscle twitches   [x] Denies all unless marked   Extremities:   [] Pain   [] Swelling   [x] Denies all unless marked  Skin:[] Rash  [] Color Change  [x] Denies all unless marked  Neurological:[] Visual Disturbance [] Double Vision [] Slurred Speech [] Trouble swallowing  [x] Vertigo [] Tingling [] Numbness [] Weakness [x] Loss of Balance   [] Loss of Consciousness [x] Memory Loss [] Seizures  [] Denies all unless marked  Psychiatric/Behavioral:[] Depression [] Anxiety  [x] Denies all unless marked  Sleep: []  Insomnia [] Sleep Disturbance [] Snoring [] Restless Legs [] Daytime Sleepiness [] Sleep Apnea  [x] Denies all unless marked
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
## 2019-05-28 ENCOUNTER — OFFICE VISIT (OUTPATIENT)
Dept: SURGERY | Age: 54
End: 2019-05-28
Payer: COMMERCIAL

## 2019-05-28 VITALS
WEIGHT: 215 LBS | OXYGEN SATURATION: 98 % | HEART RATE: 85 BPM | BODY MASS INDEX: 35.82 KG/M2 | HEIGHT: 65 IN | TEMPERATURE: 97.4 F
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
## 2019-05-28 DIAGNOSIS — S22.42XS CLOSED FRACTURE OF MULTIPLE RIBS OF LEFT SIDE, SEQUELA: ICD-10-CM

## 2019-05-28 PROCEDURE — 99213 OFFICE O/P EST LOW 20 MIN: CPT | Performed by: PHYSICIAN ASSISTANT

## 2019-05-29 ENCOUNTER — HOSPITAL ENCOUNTER (OUTPATIENT)
Dept: PHYSICAL THERAPY | Age: 54
Setting detail: THERAPIES SERIES
Discharge: HOME | End: 2019-05-29
Payer: COMMERCIAL

## 2019-05-29 PROCEDURE — 97162 PT EVAL MOD COMPLEX 30 MIN: CPT

## 2019-05-29 PROCEDURE — 95992 CANALITH REPOSITIONING PROC: CPT

## 2019-05-29 ASSESSMENT — PAIN DESCRIPTION - LOCATION: LOCATION: HEAD

## 2019-05-29 ASSESSMENT — PAIN DESCRIPTION - DESCRIPTORS: DESCRIPTORS: HEADACHE

## 2019-05-29 ASSESSMENT — PAIN DESCRIPTION - ORIENTATION: ORIENTATION: RIGHT;LOWER
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
## 2019-05-31 ENCOUNTER — HOSPITAL ENCOUNTER (OUTPATIENT)
Dept: PHYSICAL THERAPY | Age: 54
Setting detail: THERAPIES SERIES
Discharge: HOME | End: 2019-05-31
Payer: COMMERCIAL

## 2019-05-31 PROCEDURE — 97112 NEUROMUSCULAR REEDUCATION: CPT
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
## 2019-06-07 ENCOUNTER — TELEPHONE (OUTPATIENT)
Dept: SURGERY | Age: 54
End: 2019-06-07
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
## 2019-08-09 ENCOUNTER — HOSPITAL ENCOUNTER (OUTPATIENT)
Dept: GENERAL RADIOLOGY | Age: 54
Discharge: HOME | End: 2019-08-09
Payer: COMMERCIAL

## 2019-08-09 ENCOUNTER — OFFICE VISIT (OUTPATIENT)
Dept: FAMILY MEDICINE CLINIC | Age: 54
End: 2019-08-09
Payer: COMMERCIAL

## 2019-08-09 VITALS
TEMPERATURE: 97.3 F | OXYGEN SATURATION: 98 % | BODY MASS INDEX: 37.11 KG/M2 | SYSTOLIC BLOOD PRESSURE: 132 MMHG | HEART RATE: 107 BPM | WEIGHT: 223 LBS | RESPIRATION RATE: 20 BRPM | DIASTOLIC BLOOD PRESSURE: 82 MMHG

## 2019-08-09 DIAGNOSIS — F32.A ANXIETY AND DEPRESSION: ICD-10-CM

## 2019-08-09 DIAGNOSIS — G47.00 INSOMNIA, UNSPECIFIED TYPE: ICD-10-CM

## 2019-08-09 DIAGNOSIS — R07.89 STERNAL PAIN: ICD-10-CM

## 2019-08-09 DIAGNOSIS — R07.89 STERNAL PAIN: Primary | ICD-10-CM

## 2019-08-09 DIAGNOSIS — F41.9 ANXIETY AND DEPRESSION: ICD-10-CM

## 2019-08-09 DIAGNOSIS — Z87.898 HISTORY OF HEADACHE: ICD-10-CM

## 2019-08-09 PROCEDURE — 71120 X-RAY EXAM BREASTBONE 2/>VWS: CPT

## 2019-08-09 PROCEDURE — 99214 OFFICE O/P EST MOD 30 MIN: CPT | Performed by: NURSE PRACTITIONER

## 2019-08-09 RX ORDER — ESCITALOPRAM OXALATE 10 MG/1
TABLET ORAL
Qty: 30 TABLET | Refills: 3 | Status: SHIPPED | OUTPATIENT
Start: 2019-08-09 | End: 2019-10-03 | Stop reason: SDUPTHER

## 2019-08-09 RX ORDER — ZOLPIDEM TARTRATE 10 MG/1
TABLET ORAL
Qty: 30 TABLET | Refills: 0 | Status: SHIPPED | OUTPATIENT
Start: 2019-08-09 | End: 2019-10-19 | Stop reason: SDUPTHER

## 2019-08-09 RX ORDER — BUTALBITAL, ACETAMINOPHEN AND CAFFEINE 50; 325; 40 MG/1; MG/1; MG/1
1 TABLET ORAL EVERY 4 HOURS PRN
Qty: 60 TABLET | Refills: 0 | Status: SHIPPED | OUTPATIENT
Start: 2019-08-09 | End: 2019-09-13 | Stop reason: SDUPTHER

## 2019-08-09 RX ORDER — TIZANIDINE 4 MG/1
TABLET ORAL
Refills: 0 | COMMUNITY
Start: 2019-07-17 | End: 2020-06-24 | Stop reason: ALTCHOICE

## 2019-08-09 ASSESSMENT — ENCOUNTER SYMPTOMS
COLOR CHANGE: 1
SHORTNESS OF BREATH: 0
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
## 2019-08-16 ENCOUNTER — OFFICE VISIT (OUTPATIENT)
Dept: FAMILY MEDICINE CLINIC | Age: 54
End: 2019-08-16
Payer: COMMERCIAL

## 2019-08-16 VITALS
TEMPERATURE: 98.4 F | HEART RATE: 83 BPM | DIASTOLIC BLOOD PRESSURE: 84 MMHG | OXYGEN SATURATION: 99 % | RESPIRATION RATE: 20 BRPM | SYSTOLIC BLOOD PRESSURE: 134 MMHG

## 2019-08-16 DIAGNOSIS — F32.A ANXIETY AND DEPRESSION: ICD-10-CM

## 2019-08-16 DIAGNOSIS — F41.9 ANXIETY AND DEPRESSION: ICD-10-CM

## 2019-08-16 DIAGNOSIS — S22.49XS CLOSED FRACTURE OF MULTIPLE RIBS, UNSPECIFIED LATERALITY, SEQUELA: ICD-10-CM

## 2019-08-16 DIAGNOSIS — R07.89 STERNAL PAIN: ICD-10-CM

## 2019-08-16 DIAGNOSIS — G47.00 INSOMNIA, UNSPECIFIED TYPE: ICD-10-CM

## 2019-08-16 DIAGNOSIS — N63.0 BREAST NODULE: Primary | ICD-10-CM

## 2019-08-16 PROCEDURE — 99214 OFFICE O/P EST MOD 30 MIN: CPT | Performed by: NURSE PRACTITIONER

## 2019-08-16 ASSESSMENT — ENCOUNTER SYMPTOMS: SHORTNESS OF BREATH: 0

## 2019-08-16 NOTE — PROGRESS NOTES
Medical: Not on file     Non-medical: Not on file   Tobacco Use    Smoking status: Never Smoker    Smokeless tobacco: Never Used   Substance and Sexual Activity    Alcohol use: Yes     Alcohol/week: 0.0 standard drinks     Comment: occ    Drug use: No    Sexual activity: Not on file   Lifestyle    Physical activity:     Days per week: Not on file     Minutes per session: Not on file    Stress: Not on file   Relationships    Social connections:     Talks on phone: Not on file     Gets together: Not on file     Attends Jain service: Not on file     Active member of club or organization: Not on file     Attends meetings of clubs or organizations: Not on file     Relationship status: Not on file    Intimate partner violence:     Fear of current or ex partner: Not on file     Emotionally abused: Not on file     Physically abused: Not on file     Forced sexual activity: Not on file   Other Topics Concern    Not on file   Social History Narrative    Not on file       Review of Systems   Constitutional: Positive for fatigue. Negative for unexpected weight change. Respiratory: Negative for shortness of breath. Cardiovascular: Negative for chest pain. Genitourinary:        Breast tenderness   Musculoskeletal: Positive for arthralgias. Psychiatric/Behavioral: Positive for sleep disturbance. OBJECTIVE:    Physical Exam   Constitutional: She is oriented to person, place, and time. She appears well-developed and well-nourished. HENT:   Head: Normocephalic and atraumatic. Eyes: Pupils are equal, round, and reactive to light. Conjunctivae and EOM are normal.   Neck: Normal range of motion. Neck supple. No tracheal deviation present. Cardiovascular: Normal rate, regular rhythm and normal heart sounds. Pulmonary/Chest: Effort normal and breath sounds normal. No stridor. No respiratory distress. She exhibits tenderness (mid sternal to deep palpation).        Neurological: She is alert and oriented to person, place, and time. Skin: Skin is warm and dry. Psychiatric: She has a normal mood and affect. Her speech is normal and behavior is normal. Judgment and thought content normal. Her mood appears not anxious. Her affect is not angry. Cognition and memory are normal. She does not exhibit a depressed mood. Nursing note and vitals reviewed. /84 (Site: Left Upper Arm, Position: Sitting, Cuff Size: Large Adult)   Pulse 83   Temp 98.4 °F (36.9 °C) (Oral)   Resp 20   SpO2 99%      ASSESSMENT:      ICD-10-CM    1. Breast nodule N63.0 AIDE DIGITAL DIAGNOSTIC W OR WO CAD BILATERAL     US BREAST COMPLETE LEFT   2. Closed fracture of multiple ribs, unspecified laterality, sequela S22.49XS    3. Insomnia, unspecified type G47.00    4. Sternal pain R07.89    5. Anxiety and depression F41.9     F32.9        PLAN:    Galo Hutchinson: Breast Pain (Patient presents c/o breast pain on left breast.) and Letter for School/Work  Racquel Chavira for advised resignation r/t dx 2-5  F/u prn and pending results   Diagnosis and orders for this visit are above. Please note that this chart was generated using dragon dictationsoftware. Although every effort was made to ensure the accuracy of this automated transcription, some errors in transcription may have occurred.

## 2019-09-12 ENCOUNTER — TELEPHONE (OUTPATIENT)
Dept: FAMILY MEDICINE CLINIC | Age: 54
End: 2019-09-12

## 2019-09-12 DIAGNOSIS — Z87.898 HISTORY OF HEADACHE: ICD-10-CM

## 2019-09-13 RX ORDER — BUTALBITAL, ACETAMINOPHEN AND CAFFEINE 50; 325; 40 MG/1; MG/1; MG/1
1 TABLET ORAL EVERY 4 HOURS PRN
Qty: 60 TABLET | Refills: 0 | OUTPATIENT
Start: 2019-09-13 | End: 2020-03-09 | Stop reason: SDUPTHER

## 2019-10-01 RX ORDER — ESOMEPRAZOLE MAGNESIUM 40 MG/1
40 CAPSULE, DELAYED RELEASE ORAL
Qty: 90 CAPSULE | Refills: 0 | Status: SHIPPED | OUTPATIENT
Start: 2019-10-01 | End: 2019-12-17 | Stop reason: SDUPTHER

## 2019-10-03 DIAGNOSIS — F41.9 ANXIETY AND DEPRESSION: ICD-10-CM

## 2019-10-03 DIAGNOSIS — F32.A ANXIETY AND DEPRESSION: ICD-10-CM

## 2019-10-03 RX ORDER — ESCITALOPRAM OXALATE 10 MG/1
TABLET ORAL
Qty: 90 TABLET | Refills: 0 | Status: SHIPPED | OUTPATIENT
Start: 2019-10-03 | End: 2019-12-18

## 2019-10-18 DIAGNOSIS — G47.00 INSOMNIA, UNSPECIFIED TYPE: ICD-10-CM

## 2019-10-19 DIAGNOSIS — G47.00 INSOMNIA, UNSPECIFIED TYPE: ICD-10-CM

## 2019-10-19 RX ORDER — ZOLPIDEM TARTRATE 10 MG/1
TABLET ORAL
Qty: 30 TABLET | Refills: 0 | Status: SHIPPED | OUTPATIENT
Start: 2019-10-19 | End: 2019-10-21 | Stop reason: SDUPTHER

## 2019-10-19 RX ORDER — PROMETHAZINE HYDROCHLORIDE 12.5 MG/1
TABLET ORAL
Refills: 1 | COMMUNITY
Start: 2019-10-01 | End: 2020-02-07 | Stop reason: ALTCHOICE

## 2019-10-21 RX ORDER — ZOLPIDEM TARTRATE 10 MG/1
TABLET ORAL
Qty: 30 TABLET | Refills: 0 | OUTPATIENT
Start: 2019-10-19 | End: 2020-01-02 | Stop reason: SDUPTHER

## 2019-11-01 ENCOUNTER — HOSPITAL ENCOUNTER (OUTPATIENT)
Dept: GENERAL RADIOLOGY | Age: 54
Discharge: HOME | End: 2019-11-01
Payer: COMMERCIAL

## 2019-11-01 ENCOUNTER — OFFICE VISIT (OUTPATIENT)
Dept: FAMILY MEDICINE CLINIC | Age: 54
End: 2019-11-01
Payer: COMMERCIAL

## 2019-11-01 VITALS
SYSTOLIC BLOOD PRESSURE: 132 MMHG | HEART RATE: 106 BPM | RESPIRATION RATE: 18 BRPM | OXYGEN SATURATION: 99 % | DIASTOLIC BLOOD PRESSURE: 74 MMHG | BODY MASS INDEX: 37.11 KG/M2 | WEIGHT: 223 LBS | TEMPERATURE: 97.7 F

## 2019-11-01 DIAGNOSIS — R06.02 SHORTNESS OF BREATH: ICD-10-CM

## 2019-11-01 DIAGNOSIS — J40 BRONCHITIS: Primary | ICD-10-CM

## 2019-11-01 PROCEDURE — 99213 OFFICE O/P EST LOW 20 MIN: CPT | Performed by: NURSE PRACTITIONER

## 2019-11-01 PROCEDURE — 71046 X-RAY EXAM CHEST 2 VIEWS: CPT

## 2019-11-01 RX ORDER — METHYLPREDNISOLONE 4 MG/1
TABLET ORAL
Qty: 1 KIT | Refills: 0 | Status: SHIPPED | OUTPATIENT
Start: 2019-11-01 | End: 2020-02-07 | Stop reason: ALTCHOICE

## 2019-11-01 RX ORDER — CEFPROZIL 500 MG/1
500 TABLET, FILM COATED ORAL 2 TIMES DAILY
Qty: 20 TABLET | Refills: 0 | Status: SHIPPED | OUTPATIENT
Start: 2019-11-01 | End: 2019-11-11

## 2019-11-01 RX ORDER — ALBUTEROL SULFATE 90 UG/1
2 AEROSOL, METERED RESPIRATORY (INHALATION) EVERY 6 HOURS PRN
Qty: 1 INHALER | Refills: 3 | Status: SHIPPED | OUTPATIENT
Start: 2019-11-01 | End: 2020-12-02 | Stop reason: SDUPTHER

## 2019-11-01 ASSESSMENT — ENCOUNTER SYMPTOMS
COUGH: 1
VOICE CHANGE: 1
SHORTNESS OF BREATH: 1

## 2019-12-17 RX ORDER — ESOMEPRAZOLE MAGNESIUM 40 MG/1
CAPSULE, DELAYED RELEASE ORAL
Qty: 90 CAPSULE | Refills: 0 | Status: SHIPPED | OUTPATIENT
Start: 2019-12-17 | End: 2020-03-16 | Stop reason: SDUPTHER

## 2020-01-03 RX ORDER — ZOLPIDEM TARTRATE 10 MG/1
TABLET ORAL
Qty: 30 TABLET | Refills: 2 | OUTPATIENT
Start: 2020-01-03 | End: 2020-02-03

## 2020-02-07 ENCOUNTER — HOSPITAL ENCOUNTER (OUTPATIENT)
Dept: GENERAL RADIOLOGY | Age: 55
Discharge: HOME | End: 2020-02-07
Payer: COMMERCIAL

## 2020-02-07 ENCOUNTER — OFFICE VISIT (OUTPATIENT)
Dept: FAMILY MEDICINE CLINIC | Age: 55
End: 2020-02-07
Payer: COMMERCIAL

## 2020-02-07 VITALS
DIASTOLIC BLOOD PRESSURE: 72 MMHG | TEMPERATURE: 97.7 F | RESPIRATION RATE: 18 BRPM | HEART RATE: 82 BPM | OXYGEN SATURATION: 99 % | SYSTOLIC BLOOD PRESSURE: 112 MMHG

## 2020-02-07 DIAGNOSIS — R10.84 GENERALIZED ABDOMINAL PAIN: ICD-10-CM

## 2020-02-07 DIAGNOSIS — R10.10 UPPER ABDOMINAL PAIN: ICD-10-CM

## 2020-02-07 DIAGNOSIS — R10.9 RIGHT FLANK PAIN: ICD-10-CM

## 2020-02-07 LAB
ALBUMIN SERPL-MCNC: 4.3 G/DL (ref 3.5–5.2)
ALP BLD-CCNC: 90 U/L (ref 35–104)
ALT SERPL-CCNC: 20 U/L (ref 5–33)
AMYLASE: 35 U/L (ref 28–100)
ANION GAP SERPL CALCULATED.3IONS-SCNC: 11 MMOL/L (ref 7–19)
AST SERPL-CCNC: 23 U/L (ref 5–32)
BACTERIA: ABNORMAL /HPF
BASOPHILS ABSOLUTE: 0.1 K/UL (ref 0–0.2)
BASOPHILS RELATIVE PERCENT: 0.9 % (ref 0–1)
BILIRUB SERPL-MCNC: <0.2 MG/DL (ref 0.2–1.2)
BILIRUBIN URINE: NEGATIVE
BLOOD, URINE: ABNORMAL
BUN BLDV-MCNC: 14 MG/DL (ref 6–20)
CALCIUM SERPL-MCNC: 9.2 MG/DL (ref 8.6–10)
CHLORIDE BLD-SCNC: 102 MMOL/L (ref 98–111)
CLARITY: ABNORMAL
CO2: 26 MMOL/L (ref 22–29)
COLOR: YELLOW
CREAT SERPL-MCNC: 0.8 MG/DL (ref 0.5–0.9)
EOSINOPHILS ABSOLUTE: 0.1 K/UL (ref 0–0.6)
EOSINOPHILS RELATIVE PERCENT: 1.9 % (ref 0–5)
EPITHELIAL CELLS, UA: ABNORMAL /HPF
GFR NON-AFRICAN AMERICAN: >60
GLUCOSE BLD-MCNC: 99 MG/DL (ref 74–109)
GLUCOSE URINE: NEGATIVE MG/DL
HCT VFR BLD CALC: 40.7 % (ref 37–47)
HEMOGLOBIN: 12.7 G/DL (ref 12–16)
IMMATURE GRANULOCYTES #: 0 K/UL
KETONES, URINE: NEGATIVE MG/DL
LEUKOCYTE ESTERASE, URINE: ABNORMAL
LIPASE: 19 U/L (ref 13–60)
LYMPHOCYTES ABSOLUTE: 2.3 K/UL (ref 1.1–4.5)
LYMPHOCYTES RELATIVE PERCENT: 35.1 % (ref 20–40)
MCH RBC QN AUTO: 30.2 PG (ref 27–31)
MCHC RBC AUTO-ENTMCNC: 31.2 G/DL (ref 33–37)
MCV RBC AUTO: 96.7 FL (ref 81–99)
MONOCYTES ABSOLUTE: 0.5 K/UL (ref 0–0.9)
MONOCYTES RELATIVE PERCENT: 8.2 % (ref 0–10)
MUCUS: ABNORMAL /LPF
NEUTROPHILS ABSOLUTE: 3.5 K/UL (ref 1.5–7.5)
NEUTROPHILS RELATIVE PERCENT: 53.6 % (ref 50–65)
NITRITE, URINE: NEGATIVE
PDW BLD-RTO: 14 % (ref 11.5–14.5)
PH UA: 6 (ref 5–8)
PLATELET # BLD: 295 K/UL (ref 130–400)
PMV BLD AUTO: 9.7 FL (ref 9.4–12.3)
POTASSIUM SERPL-SCNC: 4.2 MMOL/L (ref 3.5–5)
PROTEIN UA: NEGATIVE MG/DL
RBC # BLD: 4.21 M/UL (ref 4.2–5.4)
RBC UA: ABNORMAL /HPF (ref 0–2)
SODIUM BLD-SCNC: 139 MMOL/L (ref 136–145)
SPECIFIC GRAVITY UA: 1.02 (ref 1–1.03)
TOTAL PROTEIN: 7.5 G/DL (ref 6.6–8.7)
URINE REFLEX TO CULTURE: YES
URINE TYPE: ABNORMAL
UROBILINOGEN, URINE: 0.2 E.U./DL
WBC # BLD: 6.5 K/UL (ref 4.8–10.8)
WBC UA: ABNORMAL /HPF (ref 0–5)

## 2020-02-07 PROCEDURE — 99214 OFFICE O/P EST MOD 30 MIN: CPT | Performed by: NURSE PRACTITIONER

## 2020-02-07 PROCEDURE — 74176 CT ABD & PELVIS W/O CONTRAST: CPT

## 2020-02-07 RX ORDER — ONDANSETRON 8 MG/1
8 TABLET, ORALLY DISINTEGRATING ORAL EVERY 8 HOURS PRN
Qty: 20 TABLET | Refills: 0 | Status: SHIPPED | OUTPATIENT
Start: 2020-02-07 | End: 2020-09-21

## 2020-02-07 RX ORDER — DICYCLOMINE HYDROCHLORIDE 10 MG/1
10 CAPSULE ORAL 4 TIMES DAILY
Qty: 120 CAPSULE | Refills: 0 | Status: SHIPPED | OUTPATIENT
Start: 2020-02-07 | End: 2020-12-02 | Stop reason: ALTCHOICE

## 2020-02-07 ASSESSMENT — ENCOUNTER SYMPTOMS
SHORTNESS OF BREATH: 0
BACK PAIN: 1
NAUSEA: 1
VOMITING: 0

## 2020-02-07 NOTE — PROGRESS NOTES
Automated Exposure Control was utilized for   adjustment of the mA and/or KV according to patient size. TECHNIQUE: Noncontrast enhanced images of the abdomen and pelvis   obtained without oral contrast.    FINDINGS:    LOWER CHEST: The lung bases and base of the heart are unremarkable. LIVER: No focal liver lesion. BILIARY SYSTEM: The gallbladder has been removed. There is no   intrahepatic or extrahepatic ductal dilation. PANCREAS: No focal pancreatic lesion. SPLEEN: Unremarkable. KIDNEYS AND URETERS: Bilateral kidneys are unremarkable. The ureters   are decompressed and normal in appearance. ADRENALS: The adrenal glands are normal in appearance. RETROPERITONEUM: No mass, lymphadenopathy or hemorrhage. GI TRACT: No evidence of obstruction or bowel wall thickening. The   appendix is visualized and unremarkable. OTHER: There is no mesenteric mass, lymphadenopathy or fluid   collection. The osseous structures and soft tissues demonstrate no   worrisome lesions. A horizontal scar is noted in the pelvis, likely   from previous  or hysterectomy. PELVIS: No mass lesion, fluid collection or significant   lymphadenopathy is seen in the pelvis. The bladder is decompressed. .       Impression   1. No evidence of acute abdominopelvic process. Signed by Dr Kandy Lopez on 2020 5:04 PM               Past Medical History:   Diagnosis Date    Pereyra esophagus     Chest pressure 10/22/2017    10/14/2014  SE Positive for clinical myocardial ischemia 10/14/2017  Cath  Moderate diffuse disease in mid and distal LAD, normal LVFX     GERD (gastroesophageal reflux disease)     Hypertension     Obesity      Prior to Visit Medications    Medication Sig Taking?  Authorizing Provider   ondansetron (ZOFRAN-ODT) 8 MG TBDP disintegrating tablet Take 1 tablet by mouth every 8 hours as needed for Nausea or Vomiting Yes MICHELE Obando   dicyclomine (BENTYL) 10 MG capsule Take 1 capsule by mouth 4 times daily Prn abd spasms Yes MICHELE Obando   escitalopram (LEXAPRO) 10 MG tablet 1 TABLET DAILY FOR ANXIETY. NEVER ABRUPTLY STOP Yes MICHELE Obando   esomeprazole (NEXIUM) 40 MG delayed release capsule TAKE 1 CAPSULE BY MOUTH EVERY DAY IN THE MORNING BEFORE BREAKFAST Yes MICHELE Obando   albuterol sulfate HFA (PROVENTIL HFA) 108 (90 Base) MCG/ACT inhaler Inhale 2 puffs into the lungs every 6 hours as needed for Wheezing Yes MICHELE Obando   butalbital-acetaminophen-caffeine (FIORICET, ESGIC) -40 MG per tablet Take 1 tablet by mouth every 4 hours as needed for Headaches Yes MICHELE Obando   tiZANidine (ZANAFLEX) 4 MG tablet TAKE 1/2 -1 TABLET BY ORAL ROUTE TWICE DAILY AS NEEDED NOT TO EXCEED 3 DOSES IN 24 HOURS Yes Historical Provider, MD   ibuprofen (ADVIL;MOTRIN) 400 MG tablet Take 1 tablet by mouth 3 times daily (with meals)  Patient taking differently: Take 800 mg by mouth 3 times daily (with meals)  Yes Michelle Parks MD   Respiratory Therapy Supplies DANA Renew cpap and supplies for continuation of HS use.  Yes MICHELE Obando   CPAP Machine MISC by Does not apply route Yes MICHELE Mayo   ranitidine (ZANTAC) 300 MG tablet Take 300 mg by mouth 2 times daily Yes Historical Provider, MD     Allergies   Allergen Reactions    Morphine Hives     Past Surgical History:   Procedure Laterality Date    CARDIAC CATHETERIZATION  10/14/13  JDT    EF 50%    CHOLECYSTECTOMY      HYSTERECTOMY      TONSILLECTOMY       Family History   Problem Relation Age of Onset    Breast Cancer Paternal Aunt      Social History     Socioeconomic History    Marital status:      Spouse name: Not on file    Number of children: Not on file    Years of education: Not on file    Highest education level: Not on file   Occupational History    Not on file   Social Needs    Financial resource strain: Not on file    Food insecurity:     Worry: Not on file     Inability: Not on file   [place] General: Bowel sounds are normal. There is no distension. Tenderness: There is abdominal tenderness (right upper and epigastric and left upper quad). There is right CVA tenderness. There is no guarding or rebound. Skin:     General: Skin is warm and dry. Capillary Refill: Capillary refill takes less than 2 seconds. Neurological:      Mental Status: She is alert and oriented to person, place, and time. Psychiatric:         Mood and Affect: Mood normal. Mood is not anxious or depressed. Affect is not angry. Speech: Speech normal.         Behavior: Behavior normal.         Thought Content: Thought content normal.         Judgment: Judgment normal.         /72 (Site: Left Upper Arm, Position: Sitting, Cuff Size: Large Adult)   Pulse 82   Temp 97.7 °F (36.5 °C) (Temporal)   Resp 18   SpO2 99%      ASSESSMENT:      ICD-10-CM    1. Upper abdominal pain R10.10 Urinalysis Reflex to Culture     CT ABDOMEN PELVIS WO CONTRAST Additional Contrast? None     CBC Auto Differential     Comprehensive Metabolic Panel     AMYLASE     Lipase     H. Pylori Antigen, Stool     CANCELED: CT ABDOMEN PELVIS WO CONTRAST Additional Contrast? None   2. Right flank pain R10.9 Urinalysis Reflex to Culture     CT ABDOMEN PELVIS WO CONTRAST Additional Contrast? None     CBC Auto Differential     Comprehensive Metabolic Panel     AMYLASE     Lipase     H. Pylori Antigen, Stool     CANCELED: CT ABDOMEN PELVIS WO CONTRAST Additional Contrast? None       PLAN:    Sara Armstrong: Abdominal Pain (Patient presents c/o right upper quadrant pain x 4 days; pain is getting worse. 7/10 pain.) and Other (Patient states that she has been having normal BM and doesn't have a gallbladder. )  CT and lab asap  Pt to wait after stat CT. CT reviewed with pt  Start bentyl as ordered  F/u asap if sx are not resolving within 3d. Diagnosis and orders for this visit are above.       Please note that this chart was generated using dragon

## 2020-02-09 LAB
H PYLORI ANTIGEN STOOL: NEGATIVE
URINE CULTURE, ROUTINE: NORMAL

## 2020-03-09 ENCOUNTER — OFFICE VISIT (OUTPATIENT)
Dept: FAMILY MEDICINE CLINIC | Age: 55
End: 2020-03-09
Payer: COMMERCIAL

## 2020-03-09 VITALS
DIASTOLIC BLOOD PRESSURE: 76 MMHG | TEMPERATURE: 97.7 F | OXYGEN SATURATION: 100 % | HEART RATE: 64 BPM | WEIGHT: 223 LBS | RESPIRATION RATE: 18 BRPM | BODY MASS INDEX: 37.11 KG/M2 | SYSTOLIC BLOOD PRESSURE: 112 MMHG

## 2020-03-09 LAB
AMPHETAMINE SCREEN, URINE: NEGATIVE
BARBITURATE SCREEN, URINE: NEGATIVE
BENZODIAZEPINE SCREEN, URINE: NEGATIVE
BUPRENORPHINE URINE: NEGATIVE
COCAINE METABOLITE SCREEN URINE: NEGATIVE
GABAPENTIN SCREEN, URINE: NORMAL
MDMA URINE: NEGATIVE
METHADONE SCREEN, URINE: NEGATIVE
METHAMPHETAMINE, URINE: NEGATIVE
OPIATE SCREEN URINE: NEGATIVE
OXYCODONE SCREEN URINE: NEGATIVE
PHENCYCLIDINE SCREEN URINE: NEGATIVE
PROPOXYPHENE SCREEN, URINE: NORMAL
THC SCREEN, URINE: NEGATIVE
TRICYCLIC ANTIDEPRESSANTS, UR: NORMAL

## 2020-03-09 PROCEDURE — 80305 DRUG TEST PRSMV DIR OPT OBS: CPT | Performed by: NURSE PRACTITIONER

## 2020-03-09 PROCEDURE — 99214 OFFICE O/P EST MOD 30 MIN: CPT | Performed by: NURSE PRACTITIONER

## 2020-03-09 RX ORDER — ZOLPIDEM TARTRATE 5 MG/1
TABLET ORAL
Qty: 30 TABLET | Refills: 2 | Status: SHIPPED | OUTPATIENT
Start: 2020-03-09 | End: 2020-04-09

## 2020-03-09 RX ORDER — IPRATROPIUM BROMIDE AND ALBUTEROL SULFATE 2.5; .5 MG/3ML; MG/3ML
SOLUTION RESPIRATORY (INHALATION)
COMMUNITY
Start: 2020-03-03

## 2020-03-09 RX ORDER — ESCITALOPRAM OXALATE 10 MG/1
TABLET ORAL
Qty: 90 TABLET | Refills: 3 | Status: SHIPPED | OUTPATIENT
Start: 2020-03-09 | End: 2020-12-02 | Stop reason: SDUPTHER

## 2020-03-09 RX ORDER — LORATADINE 10 MG/1
TABLET ORAL
COMMUNITY
Start: 2020-02-24 | End: 2020-06-24 | Stop reason: ALTCHOICE

## 2020-03-09 RX ORDER — BUTALBITAL, ACETAMINOPHEN AND CAFFEINE 50; 325; 40 MG/1; MG/1; MG/1
1 TABLET ORAL EVERY 4 HOURS PRN
Qty: 60 TABLET | Refills: 0 | Status: SHIPPED | OUTPATIENT
Start: 2020-03-09 | End: 2020-05-13 | Stop reason: SDUPTHER

## 2020-03-09 RX ORDER — ZOLPIDEM TARTRATE 5 MG/1
TABLET ORAL
COMMUNITY
Start: 2020-03-03 | End: 2020-03-09 | Stop reason: SDUPTHER

## 2020-03-09 ASSESSMENT — ENCOUNTER SYMPTOMS
SHORTNESS OF BREATH: 0
COUGH: 0

## 2020-03-09 NOTE — LETTER
[facility]  [address] [id number]  Phone: [phone]  Fax: [phone]    MICHELE Horton        March 9, 2020     Patient: Nikolay Rojas   YOB: 1965   Date of Visit: 3/9/2020       To Whom It May Concern: It is my medical opinion that Nikolay Rojas may return to work on 03/10/2020. If you have any questions or concerns, please don't hesitate to call.     Sincerely,        MICHELE Horton

## 2020-03-09 NOTE — PROGRESS NOTES
SUBJECTIVE:    Patient ID: Nichole Bateman is a53 y.o. female. Nichole Bateman is here today for Follow-Up from Hospital (Patient presents for follow up from the hospital and [facility] [place] for bronchitis. Patient states that she got worse even with the medication from [facility].)  . HPI:   HPI   Pt is here for f/u from [facility] inpatient stay. There was no f/u call made/recorded. \"I can breathe\" --response to how she is feeling now compared to admission to ER. Pt had originally been seen at [address] [place] [address] and had doxy, abx injection and diagnosed with bronchitis. Symptoms worsened throughout the week. Pt reports that as breathing worsened, she went to ER. She had cxr reported but no records are available at this time despite request this am.  Pt reports that she also had CT chest near d/c from hospital.    Pt is on no further med tx as she completed levaquin and steroids. Pt restarted claritin yesterday. Continues to feel fatigue. Pt feels she is ready to return to work. Pt is also requesting refills on lexapro for anxiety. She feels it is working well. Also requesting to have refills on ambien and fioricet while here today. She uses ambien prn for sleep. Works well and no negative effects. Uses fioricet prn for tension type headaches and continues to have positive results. Past Medical History:   Diagnosis Date    Pereyra esophagus     Chest pressure 10/22/2017    10/14/2014  SE Positive for clinical myocardial ischemia 10/14/2017  Cath  Moderate diffuse disease in mid and distal LAD, normal LVFX     GERD (gastroesophageal reflux disease)     Hypertension     Obesity      Prior to Visit Medications    Medication Sig Taking?  Authorizing Provider   butalbital-acetaminophen-caffeine (FIORICET, ESGIC) -40 MG per tablet Take 1 tablet by mouth every 4 hours as needed for Headaches Yes MICHELE Obando   zolpidem (AMBIEN) 5 MG tablet TAKE 5 MILLIGRAMS ORAL AS NEEDED AT BEDTIME FOR

## 2020-03-10 ENCOUNTER — TELEPHONE (OUTPATIENT)
Dept: FAMILY MEDICINE CLINIC | Age: 55
End: 2020-03-10

## 2020-03-10 NOTE — TELEPHONE ENCOUNTER
Patient left voice mail for you, said [facility] in [place] is fine, also said she is continuing to take Nexium that was started in hospital.

## 2020-03-16 ENCOUNTER — TELEPHONE (OUTPATIENT)
Dept: FAMILY MEDICINE CLINIC | Age: 55
End: 2020-03-16

## 2020-03-16 RX ORDER — ESOMEPRAZOLE MAGNESIUM 40 MG/1
40 CAPSULE, DELAYED RELEASE ORAL
Qty: 90 CAPSULE | Refills: 0 | Status: SHIPPED | OUTPATIENT
Start: 2020-03-16 | End: 2020-06-19

## 2020-03-18 NOTE — TELEPHONE ENCOUNTER
It was a reorder that I signed off on. It was not an intentional change. I will refill a ambien 10mg 1/2-1 po q HS prn sleep #30R2. Have pharmacy cancel the 5mg.

## 2020-04-29 ENCOUNTER — TELEPHONE (OUTPATIENT)
Dept: FAMILY MEDICINE CLINIC | Age: 55
End: 2020-04-29

## 2020-04-29 RX ORDER — BUDESONIDE AND FORMOTEROL FUMARATE DIHYDRATE 160; 4.5 UG/1; UG/1
2 AEROSOL RESPIRATORY (INHALATION) 2 TIMES DAILY PRN
COMMUNITY
Start: 2020-03-27 | End: 2021-10-20

## 2020-04-29 NOTE — TELEPHONE ENCOUNTER
Patient called asking for refill of Ambien 10 mg to be sent to [facility].     Does patient need to be advised to schedule virtual visit?  Please advise

## 2020-04-29 NOTE — TELEPHONE ENCOUNTER
Please do VADIM and then once reviewed and no discrepancy noted, ok for refill x 2. I saw pt in March 2020.

## 2020-04-30 RX ORDER — ZOLPIDEM TARTRATE 10 MG/1
10 TABLET ORAL NIGHTLY PRN
Qty: 30 TABLET | Refills: 1 | OUTPATIENT
Start: 2020-04-30 | End: 2020-06-29

## 2020-05-14 RX ORDER — BUTALBITAL, ACETAMINOPHEN AND CAFFEINE 50; 325; 40 MG/1; MG/1; MG/1
1 TABLET ORAL EVERY 4 HOURS PRN
Qty: 60 TABLET | Refills: 0 | Status: SHIPPED | OUTPATIENT
Start: 2020-05-14 | End: 2020-09-21 | Stop reason: SDUPTHER

## 2020-06-19 RX ORDER — ESOMEPRAZOLE MAGNESIUM 40 MG/1
CAPSULE, DELAYED RELEASE ORAL
Qty: 90 CAPSULE | Refills: 0 | Status: SHIPPED | OUTPATIENT
Start: 2020-06-19 | End: 2020-09-17 | Stop reason: SDUPTHER

## 2020-06-24 ENCOUNTER — OFFICE VISIT (OUTPATIENT)
Dept: FAMILY MEDICINE CLINIC | Age: 55
End: 2020-06-24
Payer: COMMERCIAL

## 2020-06-24 VITALS
WEIGHT: 224 LBS | OXYGEN SATURATION: 99 % | SYSTOLIC BLOOD PRESSURE: 118 MMHG | HEIGHT: 65 IN | TEMPERATURE: 97.5 F | DIASTOLIC BLOOD PRESSURE: 86 MMHG | HEART RATE: 80 BPM | RESPIRATION RATE: 18 BRPM | BODY MASS INDEX: 37.32 KG/M2

## 2020-06-24 PROCEDURE — 99213 OFFICE O/P EST LOW 20 MIN: CPT | Performed by: NURSE PRACTITIONER

## 2020-06-24 NOTE — PROGRESS NOTES
SUBJECTIVE:    Patient ID: Giulia Cisneros is a53 y.o. female. Giulia Cisneros is here today for Breast Problem (Patient presents c/o lumpy breasts.)  . HPI:   HPI     Approx 3wks ago felt knot to left breast and then since then has noted another beside it. No fevers reported. Also reports some tenderness on exam today to right medial breast.  Last mammo was Aug 2019  Estradiol 1mg had been stopped and then restarted prior to the tenderness. Pt is not taking this at this time  No redness reported  No discharge reported. Past Medical History:   Diagnosis Date    Pereyra esophagus     Chest pressure 10/22/2017    10/14/2014  SE Positive for clinical myocardial ischemia 10/14/2017  Cath  Moderate diffuse disease in mid and distal LAD, normal LVFX     GERD (gastroesophageal reflux disease)     Hypertension     Obesity      Prior to Visit Medications    Medication Sig Taking? Authorizing Provider   esomeprazole (NEXIUM) 40 MG delayed release capsule TAKE 1 CAPSULE BY MOUTH EVERY DAY IN THE MORNING BEFORE BREAKFAST Yes MICHELE Obando   butalbital-acetaminophen-caffeine (FIORICET, ESGIC) -40 MG per tablet Take 1 tablet by mouth every 4 hours as needed for Headaches Yes MICHELE Obando   zolpidem (AMBIEN) 10 MG tablet Take 1 tablet by mouth nightly as needed for Sleep for up to 60 days. Yes MICHELE Obando   SYMBICORT 160-4.5 MCG/ACT AERO TAKE 2 PUFFS BY MOUTH TWICE A DAY Yes Historical Provider, MD   ipratropium-albuterol (DUONEB) 0.5-2.5 (3) MG/3ML SOLN nebulizer solution INHALE 3MLS VIA NEBULIZER 4 TIMES DAILY AS NEEDED Yes Historical Provider, MD   escitalopram (LEXAPRO) 10 MG tablet 1 TABLET DAILY FOR ANXIETY.  NEVER ABRUPTLY STOP Yes MICHELE Obando   ondansetron (ZOFRAN-ODT) 8 MG TBDP disintegrating tablet Take 1 tablet by mouth every 8 hours as needed for Nausea or Vomiting Yes MICHELE Obando   dicyclomine (BENTYL) 10 MG capsule Take 1 capsule by mouth 4 times daily Prn abd spasms Yes Nahed

## 2020-07-08 RX ORDER — ZOLPIDEM TARTRATE 10 MG/1
10 TABLET ORAL NIGHTLY PRN
COMMUNITY
End: 2020-07-08 | Stop reason: SDUPTHER

## 2020-07-08 RX ORDER — ZOLPIDEM TARTRATE 10 MG/1
10 TABLET ORAL NIGHTLY PRN
Qty: 30 TABLET | Refills: 1 | Status: SHIPPED | OUTPATIENT
Start: 2020-07-08 | End: 2020-09-21 | Stop reason: SDUPTHER

## 2020-07-08 NOTE — TELEPHONE ENCOUNTER
Please advise. Patient states that the [facility] pharmacy said she needs to fill this every month otherwise they have to run a VADIM and it takes her longer to get her medication when its needed.

## 2020-07-09 NOTE — TELEPHONE ENCOUNTER
VADIM was reviewed today per office protocol. Report shows No discrepancies. Fill pattern is consistent from single provider(s) at single pharmacy(s).

## 2020-07-12 ENCOUNTER — APPOINTMENT (OUTPATIENT)
Dept: GENERAL RADIOLOGY | Facility: HOSPITAL | Age: 55
End: 2020-07-12

## 2020-07-12 ENCOUNTER — HOSPITAL ENCOUNTER (EMERGENCY)
Facility: HOSPITAL | Age: 55
Discharge: HOME | End: 2020-07-13
Admitting: EMERGENCY MEDICINE

## 2020-07-12 DIAGNOSIS — J06.9 VIRAL URI WITH COUGH: Primary | ICD-10-CM

## 2020-07-12 LAB
ALBUMIN SERPL-MCNC: 3.8 G/DL (ref 3.5–5.2)
ALBUMIN/GLOB SERPL: 1.4 G/DL
ALP SERPL-CCNC: 78 U/L (ref 39–117)
ALT SERPL W P-5'-P-CCNC: 42 U/L (ref 1–33)
ANION GAP SERPL CALCULATED.3IONS-SCNC: 12 MMOL/L (ref 5–15)
AST SERPL-CCNC: 29 U/L (ref 1–32)
B PARAPERT DNA SPEC QL NAA+PROBE: NOT DETECTED
B PERT DNA SPEC QL NAA+PROBE: NOT DETECTED
BASOPHILS # BLD AUTO: 0.08 10*3/MM3 (ref 0–0.2)
BASOPHILS NFR BLD AUTO: 1.3 % (ref 0–1.5)
BILIRUB SERPL-MCNC: <0.2 MG/DL (ref 0–1.2)
BUN SERPL-MCNC: 10 MG/DL (ref 6–20)
BUN/CREAT SERPL: 13.2 (ref 7–25)
C PNEUM DNA NPH QL NAA+NON-PROBE: NOT DETECTED
CALCIUM SPEC-SCNC: 8.9 MG/DL (ref 8.6–10.5)
CHLORIDE SERPL-SCNC: 105 MMOL/L (ref 98–107)
CO2 SERPL-SCNC: 26 MMOL/L (ref 22–29)
CREAT SERPL-MCNC: 0.76 MG/DL (ref 0.57–1)
CRP SERPL-MCNC: 0.39 MG/DL (ref 0–0.5)
D DIMER PPP FEU-MCNC: 0.49 MG/L (FEU) (ref 0–0.5)
D-LACTATE SERPL-SCNC: 1.8 MMOL/L (ref 0.5–2)
DEPRECATED RDW RBC AUTO: 45.3 FL (ref 37–54)
EOSINOPHIL # BLD AUTO: 0.14 10*3/MM3 (ref 0–0.4)
EOSINOPHIL NFR BLD AUTO: 2.3 % (ref 0.3–6.2)
ERYTHROCYTE [DISTWIDTH] IN BLOOD BY AUTOMATED COUNT: 13.6 % (ref 12.3–15.4)
FERRITIN SERPL-MCNC: 60.2 NG/ML (ref 13–150)
FLUAV H1 2009 PAND RNA NPH QL NAA+PROBE: NOT DETECTED
FLUAV H1 HA GENE NPH QL NAA+PROBE: NOT DETECTED
FLUAV H3 RNA NPH QL NAA+PROBE: NOT DETECTED
FLUAV SUBTYP SPEC NAA+PROBE: NOT DETECTED
FLUBV RNA ISLT QL NAA+PROBE: NOT DETECTED
GFR SERPL CREATININE-BSD FRML MDRD: 80 ML/MIN/1.73
GLOBULIN UR ELPH-MCNC: 2.7 GM/DL
GLUCOSE SERPL-MCNC: 94 MG/DL (ref 65–99)
HADV DNA SPEC NAA+PROBE: NOT DETECTED
HCOV 229E RNA SPEC QL NAA+PROBE: NOT DETECTED
HCOV HKU1 RNA SPEC QL NAA+PROBE: NOT DETECTED
HCOV NL63 RNA SPEC QL NAA+PROBE: NOT DETECTED
HCOV OC43 RNA SPEC QL NAA+PROBE: NOT DETECTED
HCT VFR BLD AUTO: 32.2 % (ref 34–46.6)
HGB BLD-MCNC: 10.6 G/DL (ref 12–15.9)
HMPV RNA NPH QL NAA+NON-PROBE: NOT DETECTED
HPIV1 RNA SPEC QL NAA+PROBE: NOT DETECTED
HPIV2 RNA SPEC QL NAA+PROBE: NOT DETECTED
HPIV3 RNA NPH QL NAA+PROBE: NOT DETECTED
HPIV4 P GENE NPH QL NAA+PROBE: NOT DETECTED
IMM GRANULOCYTES # BLD AUTO: 0.02 10*3/MM3 (ref 0–0.05)
IMM GRANULOCYTES NFR BLD AUTO: 0.3 % (ref 0–0.5)
LDH SERPL-CCNC: 156 U/L (ref 135–214)
LYMPHOCYTES # BLD AUTO: 2.48 10*3/MM3 (ref 0.7–3.1)
LYMPHOCYTES NFR BLD AUTO: 40.8 % (ref 19.6–45.3)
M PNEUMO IGG SER IA-ACNC: NOT DETECTED
MCH RBC QN AUTO: 30.1 PG (ref 26.6–33)
MCHC RBC AUTO-ENTMCNC: 32.9 G/DL (ref 31.5–35.7)
MCV RBC AUTO: 91.5 FL (ref 79–97)
MONOCYTES # BLD AUTO: 0.56 10*3/MM3 (ref 0.1–0.9)
MONOCYTES NFR BLD AUTO: 9.2 % (ref 5–12)
NEUTROPHILS NFR BLD AUTO: 2.8 10*3/MM3 (ref 1.7–7)
NEUTROPHILS NFR BLD AUTO: 46.1 % (ref 42.7–76)
NRBC BLD AUTO-RTO: 0 /100 WBC (ref 0–0.2)
PLATELET # BLD AUTO: 249 10*3/MM3 (ref 140–450)
PMV BLD AUTO: 9.4 FL (ref 6–12)
POTASSIUM SERPL-SCNC: 3.6 MMOL/L (ref 3.5–5.2)
PROCALCITONIN SERPL-MCNC: 0.04 NG/ML (ref 0–0.25)
PROT SERPL-MCNC: 6.5 G/DL (ref 6–8.5)
RBC # BLD AUTO: 3.52 10*6/MM3 (ref 3.77–5.28)
RHINOVIRUS RNA SPEC NAA+PROBE: NOT DETECTED
RSV RNA NPH QL NAA+NON-PROBE: NOT DETECTED
SARS-COV-2 RDRP RESP QL NAA+PROBE: NOT DETECTED
SODIUM SERPL-SCNC: 143 MMOL/L (ref 136–145)
WBC # BLD AUTO: 6.08 10*3/MM3 (ref 3.4–10.8)

## 2020-07-12 PROCEDURE — 80053 COMPREHEN METABOLIC PANEL: CPT | Performed by: PHYSICIAN ASSISTANT

## 2020-07-12 PROCEDURE — 86140 C-REACTIVE PROTEIN: CPT | Performed by: PHYSICIAN ASSISTANT

## 2020-07-12 PROCEDURE — 85379 FIBRIN DEGRADATION QUANT: CPT | Performed by: PHYSICIAN ASSISTANT

## 2020-07-12 PROCEDURE — 83605 ASSAY OF LACTIC ACID: CPT | Performed by: PHYSICIAN ASSISTANT

## 2020-07-12 PROCEDURE — 82728 ASSAY OF FERRITIN: CPT | Performed by: PHYSICIAN ASSISTANT

## 2020-07-12 PROCEDURE — 85025 COMPLETE CBC W/AUTO DIFF WBC: CPT | Performed by: PHYSICIAN ASSISTANT

## 2020-07-12 PROCEDURE — 84145 PROCALCITONIN (PCT): CPT | Performed by: PHYSICIAN ASSISTANT

## 2020-07-12 PROCEDURE — 99283 EMERGENCY DEPT VISIT LOW MDM: CPT

## 2020-07-12 PROCEDURE — 71045 X-RAY EXAM CHEST 1 VIEW: CPT

## 2020-07-12 PROCEDURE — 83615 LACTATE (LD) (LDH) ENZYME: CPT | Performed by: PHYSICIAN ASSISTANT

## 2020-07-12 PROCEDURE — 0100U HC BIOFIRE FILMARRAY RESP PANEL 2: CPT | Performed by: PHYSICIAN ASSISTANT

## 2020-07-12 PROCEDURE — 87635 SARS-COV-2 COVID-19 AMP PRB: CPT | Performed by: PHYSICIAN ASSISTANT

## 2020-07-12 RX ORDER — ESCITALOPRAM OXALATE 10 MG/1
10 TABLET ORAL NIGHTLY
COMMUNITY

## 2020-07-12 RX ORDER — BUTALBITAL, ACETAMINOPHEN AND CAFFEINE 50; 325; 40 MG/1; MG/1; MG/1
1 TABLET ORAL EVERY 4 HOURS PRN
COMMUNITY

## 2020-07-12 RX ORDER — NAPROXEN 500 MG/1
500 TABLET ORAL 2 TIMES DAILY PRN
Qty: 10 TABLET | Refills: 0 | Status: SHIPPED | OUTPATIENT
Start: 2020-07-12

## 2020-07-12 RX ORDER — ACETAMINOPHEN 500 MG
1000 TABLET ORAL ONCE
Status: COMPLETED | OUTPATIENT
Start: 2020-07-12 | End: 2020-07-12

## 2020-07-12 RX ORDER — ZOLPIDEM TARTRATE 10 MG/1
10 TABLET ORAL NIGHTLY PRN
COMMUNITY

## 2020-07-12 RX ORDER — DEXAMETHASONE SODIUM PHOSPHATE 4 MG/ML
4 INJECTION, SOLUTION INTRA-ARTICULAR; INTRALESIONAL; INTRAMUSCULAR; INTRAVENOUS; SOFT TISSUE ONCE
Status: DISCONTINUED | OUTPATIENT
Start: 2020-07-12 | End: 2020-07-13

## 2020-07-12 RX ORDER — ESOMEPRAZOLE MAGNESIUM 40 MG/1
40 CAPSULE, DELAYED RELEASE ORAL
COMMUNITY

## 2020-07-12 RX ADMIN — ACETAMINOPHEN 1000 MG: 500 TABLET, FILM COATED ORAL at 21:46

## 2020-07-13 VITALS
SYSTOLIC BLOOD PRESSURE: 134 MMHG | HEIGHT: 65 IN | DIASTOLIC BLOOD PRESSURE: 78 MMHG | WEIGHT: 224 LBS | OXYGEN SATURATION: 100 % | HEART RATE: 77 BPM | RESPIRATION RATE: 18 BRPM | TEMPERATURE: 98.5 F | BODY MASS INDEX: 37.32 KG/M2

## 2020-07-13 NOTE — DISCHARGE INSTRUCTIONS
Viral Respiratory Infection  A respiratory infection is an illness that affects part of the respiratory system, such as the lungs, nose, or throat. A respiratory infection that is caused by a virus is called a viral respiratory infection.  Common types of viral respiratory infections include:  · A cold.  · The flu (influenza).  · A respiratory syncytial virus (RSV) infection.  What are the causes?  This condition is caused by a virus.  What are the signs or symptoms?  Symptoms of this condition include:  · A stuffy or runny nose.  · Yellow or green nasal discharge.  · A cough.  · Sneezing.  · Fatigue.  · Achy muscles.  · A sore throat.  · Sweating or chills.  · A fever.  · A headache.  How is this diagnosed?  This condition may be diagnosed based on:  · Your symptoms.  · A physical exam.  · Testing of nasal swabs.  How is this treated?  This condition may be treated with medicines, such as:  · Antiviral medicine. This may shorten the length of time a person has symptoms.  · Expectorants. These make it easier to cough up mucus.  · Decongestant nasal sprays.  · Acetaminophen or NSAIDs to relieve fever and pain.  Antibiotic medicines are not prescribed for viral infections. This is because antibiotics are designed to kill bacteria. They are not effective against viruses.  Follow these instructions at home:    Managing pain and congestion  · Take over-the-counter and prescription medicines only as told by your health care provider.  · If you have a sore throat, gargle with a salt-water mixture 3-4 times a day or as needed. To make a salt-water mixture, completely dissolve ½-1 tsp of salt in 1 cup of warm water.  · Use nose drops made from salt water to ease congestion and soften raw skin around your nose.  · Drink enough fluid to keep your urine pale yellow. This helps prevent dehydration and helps loosen up mucus.  General instructions  · Rest as much as possible.  · Do not drink alcohol.  · Do not use any products  that contain nicotine or tobacco, such as cigarettes and e-cigarettes. If you need help quitting, ask your health care provider.  · Keep all follow-up visits as told by your health care provider. This is important.  How is this prevented?    · Get an annual flu shot. You may get the flu shot in late summer, fall, or winter. Ask your health care provider when you should get your flu shot.  · Avoid exposing others to your respiratory infection.  ? Stay home from work or school as told by your health care provider.  ? Wash your hands with soap and water often, especially after you cough or sneeze. If soap and water are not available, use alcohol-based hand .  · Avoid contact with people who are sick during cold and flu season. This is generally fall and winter.  Contact a health care provider if:  · Your symptoms last for 10 days or longer.  · Your symptoms get worse over time.  · You have a fever.  · You have severe sinus pain in your face or forehead.  · The glands in your jaw or neck become very swollen.  Get help right away if you:  · Feel pain or pressure in your chest.  · Have shortness of breath.  · Faint or feel like you will faint.  · Have severe and persistent vomiting.  · Feel confused or disoriented.  Summary  · A respiratory infection is an illness that affects part of the respiratory system, such as the lungs, nose, or throat. A respiratory infection that is caused by a virus is called a viral respiratory infection.  · Common types of viral respiratory infections are a cold, influenza, and respiratory syncytial virus (RSV) infection.  · Symptoms of this condition include a stuffy or runny nose, cough, sneezing, fatigue, achy muscles, sore throat, and fevers or chills.  · Antibiotic medicines are not prescribed for viral infections. This is because antibiotics are designed to kill bacteria. They are not effective against viruses.  This information is not intended to replace advice given to you by  your health care provider. Make sure you discuss any questions you have with your health care provider.  Document Released: 09/27/2006 Document Revised: 12/26/2019 Document Reviewed: 01/28/2019  Elsevier Patient Education © 2020 [employer]

## 2020-07-13 NOTE — ED PROVIDER NOTES
"Subjective   History of Present Illness    Patient is a 55-year-old female presenting to ED with cold exposure.  Patient reported 2 days ago she was at work with an individual who contacted her today stating she tested positive for COVID-19 virus.  Patient reported that shortly after that call this morning she developed \"the COVID symptoms.\"  Patient describes that she has a low-grade subjective fever, sore throat, congestion, nausea, and loss of taste and smell.  Patient reported that she is also concerned because last week she donated blood and wants to assure \"I have an infected all of those people or they did not give it to me.\"  Patient denies any medication use prior to arrival today.  Patient denies any other known sick contact.  Patient denies any chest pain, cough, shortness of breath, abdominal pain, emesis, diarrhea, or any urinary symptoms.  Patient denies chance of pregnancy secondary to hysterectomy.  Patient reports a very mild headache which she thinks is due to stress and is consistent with her chronic migraines.  Patient denies any lightheadedness, or dizziness.  Patient denies any myalgias or body aches.    Patient reports no medication allergies to morphine.  Patient reports daily medication use of Nexium, Lexapro, Ambien, and Fioricet as needed.  Patient reports medical history positive for migraines, anxiety, and GERD.  Patient has a surgical history positive for cholecystectomy, hysterectomy, tonsillectomy, and adenoidectomy.  Patient denies use of cigarettes or any other tobacco products.  Patient reports recreational occasional alcohol use.  Patient denies use of marijuana or any other IV/recreational/illicit drugs.    Records reviewed show patient was last seen in the ED on 5/7/2019 for postconcussive syndrome, vertigo, and acute non-intractable headache.  Patient was last seen in the outpatient setting at her primary care provider on 6/24/2020 for a right breast mass, left breast pain, " "left breast mass.    Review of Systems   Constitutional: Positive for fever (Subjective, \"low-grade\").        Reports + loss of smell and taste   HENT: Positive for congestion, sinus pressure and sore throat. Negative for ear pain, postnasal drip, rhinorrhea and trouble swallowing.    Eyes: Negative.    Respiratory: Negative.  Negative for cough and shortness of breath.    Cardiovascular: Negative.  Negative for chest pain.   Gastrointestinal: Positive for nausea. Negative for abdominal pain, diarrhea and vomiting.   Genitourinary: Negative.         Denies pregnancy (hysterectomy)   Musculoskeletal: Negative.  Negative for myalgias (Denies body aches).   Neurological: Positive for headaches (Mild, generalized, similar to previous migraines). Negative for dizziness and light-headedness.   Psychiatric/Behavioral: Negative.    All other systems reviewed and are negative.      Past Medical History:   Diagnosis Date   • Anxiety    • GERD (gastroesophageal reflux disease)    • Migraine        Allergies   Allergen Reactions   • Morphine Hives       Past Surgical History:   Procedure Laterality Date   • ADENOIDECTOMY     • CHOLECYSTECTOMY     • HYSTERECTOMY     • TONSILLECTOMY         No family history on file.    Social History     Socioeconomic History   • Marital status:      Spouse name: Not on file   • Number of children: Not on file   • Years of education: Not on file   • Highest education level: Not on file   Tobacco Use   • Smoking status: Never Smoker   Substance and Sexual Activity   • Alcohol use: Yes     Comment: OCC   • Drug use: Never           Objective   Physical Exam   Constitutional: She is oriented to person, place, and time. She appears well-developed and well-nourished. No distress.   HENT:   Head: Normocephalic and atraumatic.   Right Ear: External ear normal.   Left Ear: External ear normal.   Nose: Mucosal edema present. Right sinus exhibits no maxillary sinus tenderness and no frontal sinus " tenderness. Left sinus exhibits no maxillary sinus tenderness and no frontal sinus tenderness.   Mouth/Throat: Uvula is midline. Posterior oropharyngeal erythema present. No oropharyngeal exudate. Tonsils are 1+ on the right. Tonsils are 1+ on the left.   Eyes: Pupils are equal, round, and reactive to light. Conjunctivae and EOM are normal. No scleral icterus.   Neck: Normal range of motion. Neck supple.   Cardiovascular: Normal rate, regular rhythm, normal heart sounds and intact distal pulses.   Pulmonary/Chest: Effort normal and breath sounds normal. No respiratory distress. She exhibits no tenderness (No reproducible tenderness to palpitation of chest wall).   Abdominal: Soft. Bowel sounds are normal. There is no tenderness.   Musculoskeletal: Normal range of motion. She exhibits no edema.   Neurological: She is alert and oriented to person, place, and time.   Skin: Skin is warm and dry. Capillary refill takes less than 2 seconds. No rash noted. She is not diaphoretic.   Psychiatric: She has a normal mood and affect. Her behavior is normal. Judgment normal.   Nursing note and vitals reviewed.      Procedures           ED Course  ED Course as of Jul 21 0021   Sun Jul 12, 2020 2142 Labs pending collection, RN made aware.   CXR pending dictation.     [JS]   2210 CBC with no leukocytosis/leukopenia.  H&H stable at 10.6/32.2.  No other acute findings.Remainder of labs, respiratory panel, and covered swab pending.Chest x-ray shows: No active disease is seen.    [JS]   2305 Cover testing negative.CRP WNL, d-dimer WNL.Procalcitonin WNL, LDH WNL.Ferritin and lactic acid WNL.CMP with slight increase of ALT to 42 no other acute findings.Respiratory panel pending at this time.    [JS]   2317 Respiratory panel negative.    [JS]   2318 Upon reevaluation at this time discussed with patient lab and imaging findings.  Discussed with patient need for symptomatic treatment of viral URI.  Discussed need for PCP follow-up within  the next 24 to 48 hours as well as strict return precautions and need for immediate return to ED should she develop any new or worsening symptoms.  Patient very appreciative for help with no further questions, concerns, or needs at this time and is now stable for discharge.    [JS]      ED Course User Index  [JS] Chris Dickinson PA-C                                           MDM  Number of Diagnoses or Management Options  Viral URI with cough:      Amount and/or Complexity of Data Reviewed  Clinical lab tests: reviewed and ordered  Tests in the radiology section of CPT®: reviewed and ordered  Tests in the medicine section of CPT®: ordered and reviewed  Decide to obtain previous medical records or to obtain history from someone other than the patient: yes  Review and summarize past medical records: yes    Patient Progress  Patient progress: stable      Final diagnoses:   Viral URI with cough            Chris Dickinson PA-C  07/21/20 0021

## 2020-08-11 ENCOUNTER — LAB REQUISITION (OUTPATIENT)
Dept: LAB | Facility: HOSPITAL | Age: 55
End: 2020-08-11

## 2020-08-11 DIAGNOSIS — Z00.00 ENCOUNTER FOR GENERAL ADULT MEDICAL EXAMINATION WITHOUT ABNORMAL FINDINGS: ICD-10-CM

## 2020-08-11 PROCEDURE — U0003 INFECTIOUS AGENT DETECTION BY NUCLEIC ACID (DNA OR RNA); SEVERE ACUTE RESPIRATORY SYNDROME CORONAVIRUS 2 (SARS-COV-2) (CORONAVIRUS DISEASE [COVID-19]), AMPLIFIED PROBE TECHNIQUE, MAKING USE OF HIGH THROUGHPUT TECHNOLOGIES AS DESCRIBED BY CMS-2020-01-R: HCPCS | Performed by: NURSE PRACTITIONER

## 2020-08-14 LAB — SARS-COV-2 RNA RESP QL NAA+PROBE: NOT DETECTED

## 2020-09-21 RX ORDER — ESOMEPRAZOLE MAGNESIUM 40 MG/1
CAPSULE, DELAYED RELEASE ORAL
Qty: 90 CAPSULE | Refills: 1 | Status: SHIPPED | OUTPATIENT
Start: 2020-09-21 | End: 2021-01-19

## 2020-09-21 RX ORDER — BUTALBITAL, ACETAMINOPHEN AND CAFFEINE 50; 325; 40 MG/1; MG/1; MG/1
1 TABLET ORAL EVERY 6 HOURS PRN
Qty: 30 TABLET | Refills: 0 | Status: SHIPPED | OUTPATIENT
Start: 2020-09-21 | End: 2020-11-23

## 2020-09-21 RX ORDER — ZOLPIDEM TARTRATE 10 MG/1
10 TABLET ORAL NIGHTLY PRN
Qty: 30 TABLET | Refills: 0 | Status: SHIPPED | OUTPATIENT
Start: 2020-09-21 | End: 2020-12-02 | Stop reason: SDUPTHER

## 2020-09-21 NOTE — TELEPHONE ENCOUNTER
Left message advising patient. VADIM was reviewed today per office protocol. Report shows No discrepancies. Fill pattern is consistent from single provider(s) at single pharmacy(s). Interpolation Flap Text: A decision was made to reconstruct the defect utilizing an interpolation axial flap and a staged reconstruction.  A telfa template was made of the defect.  This telfa template was then used to outline the interpolation flap.  The donor area for the pedicle flap was then injected with anesthesia.  The flap was excised through the skin and subcutaneous tissue down to the layer of the underlying musculature.  The interpolation flap was carefully excised within this deep plane to maintain its blood supply.  The edges of the donor site were undermined.   The donor site was closed in a primary fashion.  The pedicle was then rotated into position and sutured.  Once the tube was sutured into place, adequate blood supply was confirmed with blanching and refill.  The pedicle was then wrapped with xeroform gauze and dressed appropriately with a telfa and gauze bandage to ensure continued blood supply and protect the attached pedicle.

## 2020-10-07 ENCOUNTER — OFFICE VISIT (OUTPATIENT)
Dept: PRIMARY CARE CLINIC | Age: 55
End: 2020-10-07
Payer: COMMERCIAL

## 2020-10-07 VITALS
DIASTOLIC BLOOD PRESSURE: 76 MMHG | BODY MASS INDEX: 36.82 KG/M2 | OXYGEN SATURATION: 98 % | HEIGHT: 65 IN | WEIGHT: 221 LBS | SYSTOLIC BLOOD PRESSURE: 118 MMHG | TEMPERATURE: 100 F | HEART RATE: 69 BPM

## 2020-10-07 LAB
INFLUENZA A ANTIBODY: NEGATIVE
INFLUENZA B ANTIBODY: NEGATIVE
S PYO AG THROAT QL: NORMAL

## 2020-10-07 PROCEDURE — 87804 INFLUENZA ASSAY W/OPTIC: CPT | Performed by: NURSE PRACTITIONER

## 2020-10-07 PROCEDURE — 99213 OFFICE O/P EST LOW 20 MIN: CPT | Performed by: NURSE PRACTITIONER

## 2020-10-07 PROCEDURE — 87880 STREP A ASSAY W/OPTIC: CPT | Performed by: NURSE PRACTITIONER

## 2020-10-07 ASSESSMENT — ENCOUNTER SYMPTOMS
FACIAL SWEATING: 0
SCALP TENDERNESS: 0
EYE REDNESS: 0
SWOLLEN GLANDS: 0
ABDOMINAL PAIN: 0
PHOTOPHOBIA: 0
COUGH: 0
BLURRED VISION: 0
VISUAL CHANGE: 0
NAUSEA: 1
SORE THROAT: 0
VOMITING: 0
DIARRHEA: 0
BACK PAIN: 0
SINUS PRESSURE: 0
RHINORRHEA: 0
EYE WATERING: 0
EYE PAIN: 0

## 2020-10-07 NOTE — LETTER
NOTIFICATION RETURN TO WORK / SCHOOL    10/7/2020    Ms. [address]      To Whom It May Concern:    Roxie Avelar was seen in our clinic on 10/7/2020. Please excuse from work until 10/9/2020.          Sincerely,      MICHELE Conklin - CNP

## 2020-10-07 NOTE — PATIENT INSTRUCTIONS
Exam consistent with viral illness. Typically viruses pass on their own in 7-10 days. You may take Over the counter medications as directed for cough, congestion, discomfort, or fever. If symptoms worsen or do not improve then call the clinic for further instructions or follow up with your Primary Care provider. You were screened for COVID today and swabbed. You will be called with the results and any indicated treatments. You were provided with written CDC isolation/quarantine guidelines. Swabbed at [facility].

## 2020-10-07 NOTE — PROGRESS NOTES
[address] [facility] [address] [address]  Dept: [phone]  Dept Fax: [phone]  Loc: [phone]    Sharan Blood is a 50 y.o. female who presents today for her medical conditions/complaintsas noted below. Sharan Blood is c/o of Headache; Fever (chills); and Fatigue (joint and muscle pain)      HPI:   Got flu shot yesterday. Woke this morning with 101 fever and headache. Took her Fioricet and when back to sleep. Then She woke up with 102 fever. She does work in the medical field. Would like Covid test.     Headache    This is a new problem. The current episode started today. The problem occurs intermittently. The problem has been unchanged. The pain is located in the frontal region. The pain quality is similar to prior headaches. The quality of the pain is described as aching. The pain is at a severity of 3/10. The pain is mild. Associated symptoms include a fever, muscle aches and nausea. Pertinent negatives include no abdominal pain, abnormal behavior, anorexia, back pain, blurred vision, coughing, dizziness, drainage, ear pain, eye pain, eye redness, eye watering, facial sweating, hearing loss, insomnia, loss of balance, neck pain, numbness, phonophobia, photophobia, rhinorrhea, scalp tenderness, seizures, sinus pressure, sore throat, swollen glands, tingling, tinnitus, visual change, vomiting, weakness or weight loss. Nothing aggravates the symptoms. She has tried acetaminophen for the symptoms. The treatment provided mild relief. Fever    This is a new problem. The current episode started today. The problem occurs intermittently. The problem has been unchanged. The maximum temperature noted was 102 to 102.9 F. The temperature was taken using an oral thermometer. Associated symptoms include headaches, muscle aches and nausea. Pertinent negatives include no abdominal pain, congestion, coughing, diarrhea, ear pain, sore throat or vomiting.  She has tried acetaminophen for the symptoms. The treatment provided moderate relief. Risk factors: no immunosuppression        Past Medical History:   Diagnosis Date    Pereyra esophagus     Chest pressure 10/22/2017    10/14/2014  SE Positive for clinical myocardial ischemia 10/14/2017  Cath  Moderate diffuse disease in mid and distal LAD, normal LVFX     GERD (gastroesophageal reflux disease)     Hypertension     Obesity      Past Surgical History:   Procedure Laterality Date    CARDIAC CATHETERIZATION  10/14/13  JDT    EF 50%    CHOLECYSTECTOMY      HYSTERECTOMY      TONSILLECTOMY       Family History   Problem Relation Age of Onset    Breast Cancer Paternal Aunt      Social History     Tobacco Use    Smoking status: Never Smoker    Smokeless tobacco: Never Used   Substance Use Topics    Alcohol use: Yes     Alcohol/week: 0.0 standard drinks     Comment: occ      Current Outpatient Medications on File Prior to Visit   Medication Sig Dispense Refill    butalbital-acetaminophen-caffeine (FIORICET, ESGIC) -40 MG per tablet Take 1 tablet by mouth every 6 hours as needed for Headaches 30 tablet 0    esomeprazole (NEXIUM) 40 MG delayed release capsule TAKE 1 CAPSULE BY MOUTH EVERY DAY IN THE MORNING BEFORE BREAKFAST 90 capsule 1    zolpidem (AMBIEN) 10 MG tablet Take 1 tablet by mouth nightly as needed for Sleep for up to 30 days. 30 tablet 0    ondansetron (ZOFRAN-ODT) 8 MG TBDP disintegrating tablet TAKE 1 TABLET BY MOUTH EVERY 8 HOURS AS NEEDED FOR NAUSEA AND VOMITING 9 tablet 2    SYMBICORT 160-4.5 MCG/ACT AERO TAKE 2 PUFFS BY MOUTH TWICE A DAY      ipratropium-albuterol (DUONEB) 0.5-2.5 (3) MG/3ML SOLN nebulizer solution INHALE 3MLS VIA NEBULIZER 4 TIMES DAILY AS NEEDED      escitalopram (LEXAPRO) 10 MG tablet 1 TABLET DAILY FOR ANXIETY.  NEVER ABRUPTLY STOP 90 tablet 3    dicyclomine (BENTYL) 10 MG capsule Take 1 capsule by mouth 4 times daily Prn abd spasms 120 capsule 0    albuterol sulfate HFA (PROVENTIL HFA) 108 (90 Base) MCG/ACT inhaler Inhale 2 puffs into the lungs every 6 hours as needed for Wheezing 1 Inhaler 3    ibuprofen (ADVIL;MOTRIN) 400 MG tablet Take 1 tablet by mouth 3 times daily (with meals) (Patient taking differently: Take 800 mg by mouth 3 times daily (with meals) ) 120 tablet 3    Respiratory Therapy Supplies DANA Renew cpap and supplies for continuation of HS use. 1 Device 0    CPAP Machine MISC by Does not apply route 1 each 0     No current facility-administered medications on file prior to visit. Allergies   Allergen Reactions    Morphine Hives    Penicillins      Health Maintenance   Topic Date Due    DTaP/Tdap/Td vaccine (1 - Tdap) 01/17/1986    Cervical cancer screen  01/17/1988    Shingles Vaccine (1 of 2) 01/17/2017    Breast cancer screen  11/14/2018    Flu vaccine (1) 09/01/2020    Lipid screen  10/26/2023    Colon cancer screen colonoscopy  01/12/2026    HIV screen  Completed    Hepatitis A vaccine  Aged Out    Hepatitis B vaccine  Aged Out    Hib vaccine  Aged Out    Meningococcal (ACWY) vaccine  Aged Out    Pneumococcal 0-64 years Vaccine  Aged Out       Subjective:   Review of Systems   Constitutional: Positive for fever. Negative for weight loss. HENT: Negative for congestion, ear pain, hearing loss, rhinorrhea, sinus pressure, sore throat and tinnitus. Eyes: Negative for blurred vision, photophobia, pain and redness. Respiratory: Negative for cough. Gastrointestinal: Positive for nausea. Negative for abdominal pain, anorexia, diarrhea and vomiting. Musculoskeletal: Negative for back pain and neck pain. Neurological: Positive for headaches. Negative for dizziness, tingling, seizures, weakness, numbness and loss of balance. Psychiatric/Behavioral: The patient does not have insomnia.         Objective:   /76   Pulse 69   Temp 100 °F (37.8 °C) (Temporal)   Ht 5' 5\" (1.651 m)   Wt 221 lb (100.2 kg)   SpO2 98% BMI 36.78 kg/m²    Physical Exam  Vitals signs and nursing note reviewed. Constitutional:       General: She is not in acute distress. Appearance: Normal appearance. She is not ill-appearing. Interventions: She is not intubated. HENT:      Head: Normocephalic. Right Ear: Tympanic membrane and ear canal normal.      Left Ear: Tympanic membrane and ear canal normal.      Nose: Nose normal.      Mouth/Throat:      Mouth: Mucous membranes are moist.      Pharynx: No posterior oropharyngeal erythema. Eyes:      Pupils: Pupils are equal, round, and reactive to light. Neck:      Musculoskeletal: Normal range of motion and neck supple. Cardiovascular:      Rate and Rhythm: Normal rate and regular rhythm. Pulses: Normal pulses. Heart sounds: Normal heart sounds. Pulmonary:      Effort: Pulmonary effort is normal. No tachypnea, bradypnea, accessory muscle usage, prolonged expiration, respiratory distress or retractions. She is not intubated. Breath sounds: Normal breath sounds and air entry. No decreased air movement or transmitted upper airway sounds. No decreased breath sounds, wheezing, rhonchi or rales. Skin:     General: Skin is warm and dry. Capillary Refill: Capillary refill takes less than 2 seconds. Neurological:      Mental Status: She is alert. Results for orders placed or performed in visit on 10/07/20   POCT Influenza A/B   Result Value Ref Range    Influenza A Ab negative     Influenza B Ab negative    POCT rapid strep A   Result Value Ref Range    Strep A Ag None Detected None Detected        Assessment:      Diagnosis Orders   1. Fever and chills  POCT Influenza A/B    POCT rapid strep A   2. Fatigue, unspecified type  POCT Influenza A/B    POCT rapid strep A   3. Nausea  POCT Influenza A/B    POCT rapid strep A   4. Sore throat  POCT Influenza A/B    POCT rapid strep A       Plan:   Tia was seen today for headache, fever and fatigue.     Diagnoses and all orders for this visit:    Fever and chills  -     POCT Influenza A/B  -     POCT rapid strep A    Fatigue, unspecified type  -     POCT Influenza A/B  -     POCT rapid strep A    Nausea  -     POCT Influenza A/B  -     POCT rapid strep A    Sore throat  -     POCT Influenza A/B  -     POCT rapid strep A         Return if symptoms worsen or fail to improve. Exam consistent with viral illness. Typically viruses pass on their own in 7-10 days. You may take Over the counter medications as directed for cough, congestion, discomfort, or fever. If symptoms worsen or do not improve then call the clinic for further instructions or follow up with your Primary Care provider. You were screened for COVID today and swabbed. You will be called with the results and any indicated treatments. You were provided with written CDC isolation/quarantine guidelines. Swabbed at [facility]. RAPID COVID SCREEN IS NEGATIVE      If symptoms do not improve then return to the clinic Friday for re evaluation. Patient given educational materials- see patient instructions. Discussed use, benefit, and side effects of prescribedmedications. All patient questions answered. Pt voiced understanding.      Electronically signed by MICHELE Mcknight CNP on 10/7/2020 at 1:59 PM

## 2020-10-27 ENCOUNTER — VIRTUAL VISIT (OUTPATIENT)
Dept: FAMILY MEDICINE CLINIC | Age: 55
End: 2020-10-27
Payer: COMMERCIAL

## 2020-10-27 PROCEDURE — 99442 PR PHYS/QHP TELEPHONE EVALUATION 11-20 MIN: CPT | Performed by: FAMILY MEDICINE

## 2020-10-27 RX ORDER — PHENAZOPYRIDINE HYDROCHLORIDE 200 MG/1
200 TABLET, FILM COATED ORAL 3 TIMES DAILY PRN
Qty: 7 TABLET | Refills: 0 | Status: SHIPPED | OUTPATIENT
Start: 2020-10-27 | End: 2020-10-30

## 2020-10-27 RX ORDER — CIPROFLOXACIN 500 MG/1
500 TABLET, FILM COATED ORAL 2 TIMES DAILY
Qty: 14 TABLET | Refills: 0 | Status: SHIPPED | OUTPATIENT
Start: 2020-10-27 | End: 2020-11-03

## 2020-10-27 NOTE — PROGRESS NOTES
Darcy Basurto is a 50 y.o. female evaluated via telephone on 10/27/2020. Consent:  She and/or health care decision maker is aware that that she may receive a bill for this telephone service, depending on her insurance coverage, and has provided verbal consent to proceed: Yes      Documentation:  I communicated with the patient and/or health care decision maker about patient here for evaluation of dysuria. Patient is a 79-year-old white female. She complains of a couple histories of fevers lower back pain dysuria frequency. She got tested for Covid and she was negative. She been taking over-the-counter Azo without results. Details of this discussion including any medical advice provided: We will start her empirically on Cipro and Pyridium. If no improvement she will need to be seen face-to-face      I affirm this is a Patient Initiated Episode with a Patient who has not had a related appointment within my department in the past 7 days or scheduled within the next 24 hours.     Patient identification was verified at the start of the visit: Yes    Total Time: minutes: 11-20 minutes    Note: not billable if this call serves to triage the patient into an appointment for the relevant concern      Rajani Chinchilla

## 2020-10-28 ENCOUNTER — OFFICE VISIT (OUTPATIENT)
Dept: FAMILY MEDICINE CLINIC | Age: 55
End: 2020-10-28
Payer: COMMERCIAL

## 2020-10-28 PROCEDURE — 96374 THER/PROPH/DIAG INJ IV PUSH: CPT | Performed by: FAMILY MEDICINE

## 2020-11-23 RX ORDER — BUTALBITAL, ACETAMINOPHEN AND CAFFEINE 50; 325; 40 MG/1; MG/1; MG/1
1 TABLET ORAL EVERY 6 HOURS PRN
Qty: 30 TABLET | Refills: 0 | Status: SHIPPED | OUTPATIENT
Start: 2020-11-23 | End: 2020-12-02 | Stop reason: SDUPTHER

## 2020-11-23 NOTE — TELEPHONE ENCOUNTER
Please do VADIM and scan to media. Let me know once this is done. I can do a short supply until pt can f/u for visit.

## 2020-11-24 ENCOUNTER — TELEPHONE (OUTPATIENT)
Dept: FAMILY MEDICINE CLINIC | Age: 55
End: 2020-11-24

## 2020-12-02 ENCOUNTER — OFFICE VISIT (OUTPATIENT)
Dept: FAMILY MEDICINE CLINIC | Age: 55
End: 2020-12-02
Payer: COMMERCIAL

## 2020-12-02 ENCOUNTER — HOSPITAL ENCOUNTER (OUTPATIENT)
Dept: GENERAL RADIOLOGY | Age: 55
Discharge: HOME | End: 2020-12-02
Payer: COMMERCIAL

## 2020-12-02 VITALS
OXYGEN SATURATION: 97 % | BODY MASS INDEX: 38.49 KG/M2 | HEIGHT: 65 IN | HEART RATE: 101 BPM | WEIGHT: 231 LBS | RESPIRATION RATE: 18 BRPM | DIASTOLIC BLOOD PRESSURE: 82 MMHG | TEMPERATURE: 97 F | SYSTOLIC BLOOD PRESSURE: 114 MMHG

## 2020-12-02 DIAGNOSIS — Z13.220 SCREENING, LIPID: ICD-10-CM

## 2020-12-02 DIAGNOSIS — F32.A ANXIETY AND DEPRESSION: ICD-10-CM

## 2020-12-02 DIAGNOSIS — F41.9 ANXIETY AND DEPRESSION: ICD-10-CM

## 2020-12-02 LAB
ALBUMIN SERPL-MCNC: 4.1 G/DL (ref 3.5–5.2)
ALP BLD-CCNC: 79 U/L (ref 35–104)
ALT SERPL-CCNC: 18 U/L (ref 5–33)
ANION GAP SERPL CALCULATED.3IONS-SCNC: 9 MMOL/L (ref 7–19)
AST SERPL-CCNC: 17 U/L (ref 5–32)
BACTERIA: NEGATIVE /HPF
BASOPHILS ABSOLUTE: 0.1 K/UL (ref 0–0.2)
BASOPHILS RELATIVE PERCENT: 1.4 % (ref 0–1)
BILIRUB SERPL-MCNC: <0.2 MG/DL (ref 0.2–1.2)
BILIRUBIN URINE: NEGATIVE
BLOOD, URINE: ABNORMAL
BUN BLDV-MCNC: 15 MG/DL (ref 6–20)
CALCIUM SERPL-MCNC: 9.3 MG/DL (ref 8.6–10)
CHLORIDE BLD-SCNC: 104 MMOL/L (ref 98–111)
CHOLESTEROL, TOTAL: 276 MG/DL (ref 160–199)
CLARITY: CLEAR
CO2: 27 MMOL/L (ref 22–29)
COLOR: YELLOW
CREAT SERPL-MCNC: 0.7 MG/DL (ref 0.5–0.9)
CRYSTALS, UA: ABNORMAL /HPF
EOSINOPHILS ABSOLUTE: 0.1 K/UL (ref 0–0.6)
EOSINOPHILS RELATIVE PERCENT: 1.6 % (ref 0–5)
EPITHELIAL CELLS, UA: 1 /HPF (ref 0–5)
GFR AFRICAN AMERICAN: >59
GFR NON-AFRICAN AMERICAN: >60
GLUCOSE BLD-MCNC: 90 MG/DL (ref 74–109)
GLUCOSE URINE: NEGATIVE MG/DL
HCT VFR BLD CALC: 37.8 % (ref 37–47)
HDLC SERPL-MCNC: 54 MG/DL (ref 65–121)
HEMOGLOBIN: 12 G/DL (ref 12–16)
HYALINE CASTS: 1 /HPF (ref 0–8)
IMMATURE GRANULOCYTES #: 0 K/UL
KETONES, URINE: NEGATIVE MG/DL
LDL CHOLESTEROL CALCULATED: 162 MG/DL
LEUKOCYTE ESTERASE, URINE: NEGATIVE
LYMPHOCYTES ABSOLUTE: 1.8 K/UL (ref 1.1–4.5)
LYMPHOCYTES RELATIVE PERCENT: 35.9 % (ref 20–40)
MCH RBC QN AUTO: 30.3 PG (ref 27–31)
MCHC RBC AUTO-ENTMCNC: 31.7 G/DL (ref 33–37)
MCV RBC AUTO: 95.5 FL (ref 81–99)
MONOCYTES ABSOLUTE: 0.4 K/UL (ref 0–0.9)
MONOCYTES RELATIVE PERCENT: 7.6 % (ref 0–10)
NEUTROPHILS ABSOLUTE: 2.6 K/UL (ref 1.5–7.5)
NEUTROPHILS RELATIVE PERCENT: 53.3 % (ref 50–65)
NITRITE, URINE: NEGATIVE
PDW BLD-RTO: 14.1 % (ref 11.5–14.5)
PH UA: 5.5 (ref 5–8)
PLATELET # BLD: 283 K/UL (ref 130–400)
PMV BLD AUTO: 9.9 FL (ref 9.4–12.3)
POTASSIUM REFLEX MAGNESIUM: 4.4 MMOL/L (ref 3.5–5)
PROTEIN UA: NEGATIVE MG/DL
RBC # BLD: 3.96 M/UL (ref 4.2–5.4)
RBC UA: 2 /HPF (ref 0–4)
SODIUM BLD-SCNC: 140 MMOL/L (ref 136–145)
SPECIFIC GRAVITY UA: 1.02 (ref 1–1.03)
T4 FREE: 0.81 NG/DL (ref 0.93–1.7)
TOTAL PROTEIN: 7.1 G/DL (ref 6.6–8.7)
TRIGL SERPL-MCNC: 301 MG/DL (ref 0–149)
TSH SERPL DL<=0.05 MIU/L-ACNC: 1.17 UIU/ML (ref 0.27–4.2)
UROBILINOGEN, URINE: 0.2 E.U./DL
VITAMIN D 25-HYDROXY: 13 NG/ML
WBC # BLD: 4.9 K/UL (ref 4.8–10.8)
WBC UA: 1 /HPF (ref 0–5)

## 2020-12-02 PROCEDURE — 1111F DSCHRG MED/CURRENT MED MERGE: CPT | Performed by: NURSE PRACTITIONER

## 2020-12-02 PROCEDURE — 73630 X-RAY EXAM OF FOOT: CPT

## 2020-12-02 PROCEDURE — 99214 OFFICE O/P EST MOD 30 MIN: CPT | Performed by: NURSE PRACTITIONER

## 2020-12-02 RX ORDER — BUSPIRONE HYDROCHLORIDE 10 MG/1
TABLET ORAL
Qty: 60 TABLET | Refills: 5 | Status: SHIPPED | OUTPATIENT
Start: 2020-12-02 | End: 2020-12-30 | Stop reason: SDUPTHER

## 2020-12-02 RX ORDER — BUTALBITAL, ACETAMINOPHEN AND CAFFEINE 50; 325; 40 MG/1; MG/1; MG/1
1 TABLET ORAL EVERY 6 HOURS PRN
Qty: 60 TABLET | Refills: 0 | Status: SHIPPED | OUTPATIENT
Start: 2020-12-02 | End: 2021-05-17

## 2020-12-02 RX ORDER — ALBUTEROL SULFATE 90 UG/1
2 AEROSOL, METERED RESPIRATORY (INHALATION) EVERY 6 HOURS PRN
Qty: 1 INHALER | Refills: 5 | Status: SHIPPED | OUTPATIENT
Start: 2020-12-02 | End: 2021-10-20 | Stop reason: SDUPTHER

## 2020-12-02 RX ORDER — ZOLPIDEM TARTRATE 10 MG/1
10 TABLET ORAL NIGHTLY PRN
Qty: 30 TABLET | Refills: 2 | Status: SHIPPED | OUTPATIENT
Start: 2020-12-02 | End: 2021-08-09

## 2020-12-02 RX ORDER — ESCITALOPRAM OXALATE 10 MG/1
TABLET ORAL
Qty: 90 TABLET | Refills: 3 | Status: SHIPPED | OUTPATIENT
Start: 2020-12-02 | End: 2022-02-23

## 2020-12-02 RX ORDER — TIZANIDINE 4 MG/1
4 TABLET ORAL 3 TIMES DAILY PRN
Qty: 60 TABLET | Refills: 1 | Status: SHIPPED | OUTPATIENT
Start: 2020-12-02 | End: 2021-05-21

## 2020-12-02 ASSESSMENT — ENCOUNTER SYMPTOMS
TROUBLE SWALLOWING: 0
VOMITING: 1
CONSTIPATION: 1
DIARRHEA: 1
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
## 2020-12-19 ENCOUNTER — APPOINTMENT (OUTPATIENT)
Dept: CT IMAGING | Age: 55
End: 2020-12-19
Payer: COMMERCIAL

## 2020-12-19 ENCOUNTER — APPOINTMENT (OUTPATIENT)
Dept: GENERAL RADIOLOGY | Age: 55
End: 2020-12-19
Payer: COMMERCIAL

## 2020-12-19 ENCOUNTER — HOSPITAL ENCOUNTER (EMERGENCY)
Age: 55
Discharge: HOME | End: 2020-12-19
Attending: EMERGENCY MEDICINE
Payer: COMMERCIAL

## 2020-12-19 VITALS
SYSTOLIC BLOOD PRESSURE: 141 MMHG | RESPIRATION RATE: 19 BRPM | BODY MASS INDEX: 37.49 KG/M2 | OXYGEN SATURATION: 94 % | HEIGHT: 65 IN | TEMPERATURE: 98.3 F | DIASTOLIC BLOOD PRESSURE: 77 MMHG | WEIGHT: 225 LBS | HEART RATE: 103 BPM

## 2020-12-19 PROCEDURE — 99283 EMERGENCY DEPT VISIT LOW MDM: CPT

## 2020-12-19 PROCEDURE — 73610 X-RAY EXAM OF ANKLE: CPT

## 2020-12-19 PROCEDURE — 73590 X-RAY EXAM OF LOWER LEG: CPT

## 2020-12-19 PROCEDURE — 72131 CT LUMBAR SPINE W/O DYE: CPT

## 2020-12-19 PROCEDURE — 73630 X-RAY EXAM OF FOOT: CPT

## 2020-12-19 PROCEDURE — 72170 X-RAY EXAM OF PELVIS: CPT

## 2020-12-19 PROCEDURE — 6370000000 HC RX 637 (ALT 250 FOR IP): Performed by: EMERGENCY MEDICINE

## 2020-12-19 PROCEDURE — 99999 PR OFFICE/OUTPT VISIT,PROCEDURE ONLY: CPT | Performed by: EMERGENCY MEDICINE

## 2020-12-19 RX ORDER — OXYCODONE AND ACETAMINOPHEN 10; 325 MG/1; MG/1
1 TABLET ORAL ONCE
Status: COMPLETED | OUTPATIENT
Start: 2020-12-19 | End: 2020-12-19

## 2020-12-19 RX ORDER — HYDROCODONE BITARTRATE AND ACETAMINOPHEN 5; 325 MG/1; MG/1
1 TABLET ORAL EVERY 6 HOURS PRN
Qty: 12 TABLET | Refills: 0 | Status: SHIPPED | OUTPATIENT
Start: 2020-12-19 | End: 2020-12-22

## 2020-12-19 RX ADMIN — OXYCODONE HYDROCHLORIDE AND ACETAMINOPHEN 1 TABLET: 10; 325 TABLET ORAL at 04:16

## 2020-12-19 ASSESSMENT — ENCOUNTER SYMPTOMS
SHORTNESS OF BREATH: 0
NAUSEA: 0
BACK PAIN: 1
DIARRHEA: 0
RHINORRHEA: 0
VOMITING: 0
SORE THROAT: 0
ABDOMINAL PAIN: 0
COUGH: 0

## 2020-12-19 ASSESSMENT — PAIN SCALES - GENERAL
PAINLEVEL_OUTOF10: 9
PAINLEVEL_OUTOF10: 8

## 2020-12-19 NOTE — ED NOTES
LSO brace applied, walker boot applied to left lower extermity, PT given crutches and demonstrates proper use of crutches.       Gloria Alaniz, [facility]  12/19/20 3100

## 2020-12-19 NOTE — ED PROVIDER NOTES
[facility]  eMERGENCY dEPARTMENT eNCOUnter      Pt Name: Abisai Alvarez  MRN: [id number]  [place] 1965  Date of evaluation: 12/19/2020  Provider: Arianne Corbett MD    [address] [place] [address]       Chief Complaint   Patient presents with    Fall     low back; left leg and foot pain         HISTORY OF PRESENT ILLNESS   (Location/Symptom, Timing/Onset,Context/Setting, Quality, Duration, Modifying Factors, Severity)  Note limiting factors. Abisai Alvarez is a 50 y.o. female who presents to the emergency department after a fall. The patient states that she was at the top of her 4 stairs and she for some reason thought she is near the bottom and stepped off of this. She landed somewhat on her left side and denies hitting her head or losing consciousness. She has no complaint of neck pain. Tells me she crawled to her bedroom. She complains of left ankle and foot pain mostly as well as low back pain. HPI    NursingNotes were reviewed. REVIEW OF SYSTEMS    (2-9 systems for level 4, 10 or more for level 5)     Review of Systems   Constitutional: Negative for chills and fever. HENT: Negative for rhinorrhea and sore throat. Respiratory: Negative for cough and shortness of breath. Cardiovascular: Negative for chest pain. Gastrointestinal: Negative for abdominal pain, diarrhea, nausea and vomiting. Genitourinary: Negative for dysuria and frequency. Musculoskeletal: Positive for back pain and gait problem. Negative for neck pain. Skin: Negative for wound. Neurological: Negative for dizziness and headaches. All other systems reviewed and are negative.            PAST MEDICALHISTORY     Past Medical History:   Diagnosis Date    Pereyra esophagus     Chest pressure 10/22/2017    10/14/2014  SE Positive for clinical myocardial ischemia 10/14/2017  Cath  Moderate diffuse disease in mid and distal LAD, normal LVFX     GERD (gastroesophageal reflux disease)     Hypertension     Obesity SURGICAL HISTORY       Past Surgical History:   Procedure Laterality Date    CARDIAC CATHETERIZATION  10/14/13  JDT    EF 50%    CHOLECYSTECTOMY      COLONOSCOPY  01/12/2016    Dr Sophia Lantigua- normal, suspect IB    HYSTERECTOMY      TONSILLECTOMY      UPPER GASTROINTESTINAL ENDOSCOPY  01/12/2016    Dr Blanco Handing:  Barretts esophagus         CURRENT MEDICATIONS     Discharge Medication List as of 12/19/2020  5:09 AM      CONTINUE these medications which have NOT CHANGED    Details   escitalopram (LEXAPRO) 10 MG tablet 1 TABLET DAILY FOR ANXIETY. NEVER ABRUPTLY STOP, Disp-90 tablet,R-3Normal      albuterol sulfate HFA (PROVENTIL HFA) 108 (90 Base) MCG/ACT inhaler Inhale 2 puffs into the lungs every 6 hours as needed for Wheezing, Disp-1 Inhaler,R-5Normal      busPIRone (BUSPAR) 10 MG tablet 1/2 - 1 tablet bid prn anxiety, Disp-60 tablet,R-5Normal      tiZANidine (ZANAFLEX) 4 MG tablet Take 1 tablet by mouth 3 times daily as needed (muscle pain), Disp-60 tablet,R-1Normal      zolpidem (AMBIEN) 10 MG tablet Take 1 tablet by mouth nightly as needed for Sleep for up to 30 days. , Disp-30 tablet,R-2Normal      butalbital-acetaminophen-caffeine (FIORICET, ESGIC) -40 MG per tablet Take 1 tablet by mouth every 6 hours as needed for Headaches, Disp-60 tablet,R-0Not to exceed 5 additional fills before 03/20/2021Normal      esomeprazole (NEXIUM) 40 MG delayed release capsule TAKE 1 CAPSULE BY MOUTH EVERY DAY IN THE MORNING BEFORE BREAKFAST, Disp-90 capsule,R-1Normal      ondansetron (ZOFRAN-ODT) 8 MG TBDP disintegrating tablet TAKE 1 TABLET BY MOUTH EVERY 8 HOURS AS NEEDED FOR NAUSEA AND VOMITING, Disp-9 tablet,R-2Normal      SYMBICORT 160-4.5 MCG/ACT AERO TAKE 2 PUFFS BY MOUTH TWICE A DAY, DAWHistorical Med      ipratropium-albuterol (DUONEB) 0.5-2.5 (3) MG/3ML SOLN nebulizer solution INHALE 3MLS VIA NEBULIZER 4 TIMES DAILY AS NEEDEDHistorical Med (!) 141/77 98.3 °F (36.8 °C) -- 103 19 94 % 5' 5\" (1.651 m) 225 lb (102.1 kg)       Physical Exam  Vitals signs and nursing note reviewed. Constitutional:       General: She is not in acute distress. Appearance: Normal appearance. She is well-developed. She is not ill-appearing, toxic-appearing or diaphoretic. HENT:      Head: Normocephalic and atraumatic. Right Ear: External ear normal.      Left Ear: External ear normal.      Nose: Nose normal.      Mouth/Throat:      Mouth: Mucous membranes are moist.   Eyes:      Conjunctiva/sclera: Conjunctivae normal.   Neck:      Musculoskeletal: Normal range of motion. Trachea: No tracheal deviation. Cardiovascular:      Rate and Rhythm: Normal rate and regular rhythm. Pulses: Normal pulses. Heart sounds: Normal heart sounds. No murmur. Pulmonary:      Effort: No respiratory distress. Breath sounds: Normal breath sounds. No wheezing or rales. Abdominal:      Palpations: Abdomen is soft. There is no mass. Tenderness: There is no abdominal tenderness. Musculoskeletal:      Right shoulder: Normal.      Left shoulder: Normal.      Left ankle: She exhibits decreased range of motion and swelling. She exhibits no deformity. Tenderness. Lateral malleolus tenderness found. No medial malleolus tenderness found. Cervical back: Normal. She exhibits no bony tenderness. Thoracic back: Normal. She exhibits no bony tenderness. Lumbar back: She exhibits bony tenderness. Back:       Left foot: Decreased range of motion. Normal capillary refill. Bony tenderness and swelling present. No deformity. Feet:       Comments: Mild b/l hip pain but normal ROM of hips    Left lateral ankle pain and swelling over lateral mal    Left foot lateral anterior   Skin:     General: Skin is warm and dry. Neurological:      Mental Status: She is alert and oriented to person, place, and time. GCS: GCS eye subscore is 4. GCS verbal subscore is 5. GCS motor subscore is 6. Sensory: No sensory deficit. Motor: No weakness. DIAGNOSTIC RESULTS         RADIOLOGY:  Non-plain film images such as CT, Ultrasound and MRI are read by the radiologist. Plain radiographic images are visualized and preliminarily interpreted bythe emergency physician with the below findings:      XR TIBIA FIBULA LEFT (2 VIEWS)    (Results Pending)   XR ANKLE LEFT (MIN 3 VIEWS)    (Results Pending)   XR FOOT LEFT (MIN 3 VIEWS)    (Results Pending)   CT LUMBAR SPINE WO CONTRAST    (Results Pending)   XR PELVIS (1-2 VIEWS)    (Results Pending)     PreliminaryFindingsOnly See Final Report For Complete Findings  CT L SPINE:  Comparison CT fromSeptember 2016 not available for direct comparison. Subtle cortical irregularityand loss of height along the superior aspect of the L1 vertebral body, for  example on image 31 of series 5, mayrepresent acute fracture. No retropulsion. If there are no  contraindications, MRI mayfurther evaluate. Multilevel degenerative changes. LABS:  Labs Reviewed - No data to display    All other labs were within normal range or not returned as of this dictation.     EMERGENCY DEPARTMENT COURSE and DIFFERENTIAL DIAGNOSIS/MDM:   Vitals:    Vitals:    12/19/20 0300   BP: (!) 141/77   Pulse: 103   Resp: 19   Temp: 98.3 °F (36.8 °C)   SpO2: 94%   Weight: 225 lb (102.1 kg)   Height: 5' 5\" (1.651 m)       MDM  Number of Diagnoses or Management Options     Amount and/or Complexity of Data Reviewed  Tests in the radiology section of CPT®: ordered and reviewed  Independent visualization of images, tracings, or specimens: yes VSS, mechanical fall down stairs, no head trauma, pt well appearing, does have L spine ttp, will tx as acute fx and placed in LSO, can follow up with Dr. Azar Ferguson, left 5th prox MT fx oblique fx, placed in boot, nwb, crutches, f/u Dr. Jermaine Haro within 1 week, understands return precautions      CONSULTS:  None    PROCEDURES:  Unless otherwise noted below, none     Procedures    FINAL IMPRESSION      1. Closed fracture of first lumbar vertebra, unspecified fracture morphology, initial encounter [facility])          DISPOSITION/PLAN   DISPOSITION Decision To Discharge 12/19/2020 05:08:32 AM      PATIENT REFERRED TO:  Barry Mae DO  [facility]     Schedule an appointment as soon as possible for a visit in 1 week      Carter Nolasco MD  [address]  [phone]    Schedule an appointment as soon as possible for a visit in 1 week        DISCHARGE MEDICATIONS:  Discharge Medication List as of 12/19/2020  5:09 AM      START taking these medications    Details   HYDROcodone-acetaminophen (NORCO) 5-325 MG per tablet Take 1 tablet by mouth every 6 hours as needed for Pain for up to 3 days. Intended supply: 3 days.  Take lowest dose possible to manage pain, Disp-12 tablet, R-0Print                (Please note that portions of this note were completed with a voice recognition program.  Efforts were made to edit thedictations but occasionally words are mis-transcribed.)    Srikanth Song MD (electronically signed)  Attending Emergency Physician       Jennifer Butler MD  12/19/20 6192

## 2020-12-22 RX ORDER — ERGOCALCIFEROL 1.25 MG/1
50000 CAPSULE ORAL WEEKLY
Qty: 4 CAPSULE | Refills: 2 | Status: SHIPPED | OUTPATIENT
Start: 2020-12-22 | End: 2021-03-11

## 2020-12-22 RX ORDER — ROSUVASTATIN CALCIUM 10 MG/1
10 TABLET, COATED ORAL NIGHTLY
Qty: 30 TABLET | Refills: 3 | Status: SHIPPED | OUTPATIENT
Start: 2020-12-22 | End: 2021-03-17

## 2020-12-23 ENCOUNTER — OFFICE VISIT (OUTPATIENT)
Dept: NEUROSURGERY | Age: 55
End: 2020-12-23
Payer: COMMERCIAL

## 2020-12-23 VITALS
DIASTOLIC BLOOD PRESSURE: 80 MMHG | HEIGHT: 65 IN | SYSTOLIC BLOOD PRESSURE: 141 MMHG | WEIGHT: 225 LBS | BODY MASS INDEX: 37.49 KG/M2 | HEART RATE: 103 BPM

## 2020-12-23 PROCEDURE — 99203 OFFICE O/P NEW LOW 30 MIN: CPT | Performed by: NEUROLOGICAL SURGERY

## 2020-12-23 RX ORDER — CYCLOBENZAPRINE HCL 10 MG
10 TABLET ORAL 3 TIMES DAILY PRN
Qty: 90 TABLET | Refills: 0 | Status: SHIPPED | OUTPATIENT
Start: 2020-12-23 | End: 2021-01-18

## 2020-12-23 NOTE — PROGRESS NOTES
[facility] Neurosurgery  Office Visit        Chief Complaint   Patient presents with    Follow-Up from Hospital     back fracture       HISTORY OF PRESENT ILLNESS:      The patient is a 50 y.o. female who presents as an ED follow up for an injury sustained after she fell down the stairs on 12/19/2020. She states she was standing at the top of her stairs and missed a step and fell down ~4 stairs to the floor. She landed on her back and on her left side. She noted immediate lumbosacral pain and severe pain in her left leg and foot. She crawled to her bedroom and was then brought to the [facility] ED. During her workup, a CT scan of her lumbar spine revealed a questionable L1 compression deformity and she was placed in an LSO brace and referred for neurosurgical evaluation. She was also diagnosed with multiple fractures in her L foot and ankle and has since been evaluated by Dr. Brenda Pimentel. She is in a wrap with plans for cast placement when her swelling subsides. Regarding her back, she complains mostly of muscular right-sided pain near the lumbosacral junction. She denies any midline back pain or radicular pain. She denies any numbness or weakness in her legs. She denies any pain at the thoracolumbar junction. She actually feels that the brace worsens her pain. She was provided a short course of norco by the ED for pain and she has a pre-existing prescription for zanaflex but she notes this is too sedating.        MEDICAL HISTORY:             Past Medical History:   Diagnosis Date    Pereyra esophagus     Chest pressure 10/22/2017    10/14/2014  SE Positive for clinical myocardial ischemia 10/14/2017  Cath  Moderate diffuse disease in mid and distal LAD, normal LVFX     GERD (gastroesophageal reflux disease)     Hypertension     Obesity        Past Surgical History:   Procedure Laterality Date    CARDIAC CATHETERIZATION  10/14/13  JDT    EF 50%    CHOLECYSTECTOMY      COLONOSCOPY  01/12/2016 Dr Sergei Ramires- normal, suspect IB    HYSTERECTOMY      TONSILLECTOMY      UPPER GASTROINTESTINAL ENDOSCOPY  01/12/2016    Dr Ryan Paulson:  Barretts esophagus         Current Outpatient Medications:     cyclobenzaprine (FLEXERIL) 10 MG tablet, Take 1 tablet by mouth 3 times daily as needed for Muscle spasms, Disp: 90 tablet, Rfl: 0    vitamin D (ERGOCALCIFEROL) 1.25 MG (76709 UT) CAPS capsule, Take 1 capsule by mouth once a week, Disp: 4 capsule, Rfl: 2    rosuvastatin (CRESTOR) 10 MG tablet, Take 1 tablet by mouth nightly, Disp: 30 tablet, Rfl: 3    escitalopram (LEXAPRO) 10 MG tablet, 1 TABLET DAILY FOR ANXIETY. NEVER ABRUPTLY STOP, Disp: 90 tablet, Rfl: 3    albuterol sulfate HFA (PROVENTIL HFA) 108 (90 Base) MCG/ACT inhaler, Inhale 2 puffs into the lungs every 6 hours as needed for Wheezing, Disp: 1 Inhaler, Rfl: 5    busPIRone (BUSPAR) 10 MG tablet, 1/2 - 1 tablet bid prn anxiety, Disp: 60 tablet, Rfl: 5    tiZANidine (ZANAFLEX) 4 MG tablet, Take 1 tablet by mouth 3 times daily as needed (muscle pain), Disp: 60 tablet, Rfl: 1    zolpidem (AMBIEN) 10 MG tablet, Take 1 tablet by mouth nightly as needed for Sleep for up to 30 days. , Disp: 30 tablet, Rfl: 2    butalbital-acetaminophen-caffeine (FIORICET, ESGIC) -40 MG per tablet, Take 1 tablet by mouth every 6 hours as needed for Headaches, Disp: 60 tablet, Rfl: 0    esomeprazole (NEXIUM) 40 MG delayed release capsule, TAKE 1 CAPSULE BY MOUTH EVERY DAY IN THE MORNING BEFORE BREAKFAST, Disp: 90 capsule, Rfl: 1    ondansetron (ZOFRAN-ODT) 8 MG TBDP disintegrating tablet, TAKE 1 TABLET BY MOUTH EVERY 8 HOURS AS NEEDED FOR NAUSEA AND VOMITING, Disp: 9 tablet, Rfl: 2    SYMBICORT 160-4.5 MCG/ACT AERO, TAKE 2 PUFFS BY MOUTH TWICE A DAY, Disp: , Rfl:     ipratropium-albuterol (DUONEB) 0.5-2.5 (3) MG/3ML SOLN nebulizer solution, INHALE 3MLS VIA NEBULIZER 4 TIMES DAILY AS NEEDED, Disp: , Rfl:   ibuprofen (ADVIL;MOTRIN) 400 MG tablet, Take 1 tablet by mouth 3 times daily (with meals) (Patient taking differently: Take 800 mg by mouth 3 times daily (with meals) ), Disp: 120 tablet, Rfl: 3    Respiratory Therapy Supplies DANA, Renew cpap and supplies for continuation of HS use., Disp: 1 Device, Rfl: 0    CPAP Machine MISC, by Does not apply route, Disp: 1 each, Rfl: 0    Allergies:  Morphine and Penicillins    Social History:   Social History     Tobacco Use   Smoking Status Never Smoker   Smokeless Tobacco Never Used     Social History     Substance and Sexual Activity   Alcohol Use Yes    Alcohol/week: 0.0 standard drinks    Comment: occ         Family History:   Family History   Problem Relation Age of Onset    Breast Cancer Paternal Aunt        REVIEW OF SYSTEMS:    Constitutional: Negative. HENT: Negative. Eyes: Negative. Respiratory: Negative. Cardiovascular: Negative. Gastrointestinal: Negative. Genitourinary: Negative. Musculoskeletal: Positive for back pain. Skin: Negative. Neurological: Negative. Endo/Heme/Allergies: Negative. Psychiatric/Behavioral: Negative. Review of Systems was obtained by the medical assistant and reviewed by myself. PHYSICAL EXAM:    Vitals:    12/23/20 1004   BP: (!) 141/80   Pulse: 103       Constitutional: Appears well-developed and well-nourished. Eyes  conjunctiva normal.  Pupils react to light  Ear, nose,throat -Normal pinna and tragus, No scars, or lesions over external nose or ears, no obvious atrophy of tongue  Neck- symmetric, trachea midline, no jugular vein distension  Respiration- chest wall symmetric, normal effort without use of accessory muscles  Musculoskeletal  no significant wasting of muscles noted, no bony deformities, symmetric bulk  Extremities- no clubbing, cyanosis or edema  Skin  warm, dry, and intact. No rash,erythema, or pallor.   Psychiatric  mood, affect, and behavior appear normal. NEUROLOGIC EXAM:    MENTAL STATUS: Alert, oriented, thought content appropriate    CRANIAL NERVES: PERRL, EOMI, symmetric facies, tongue midline    MOTOR:     Right Upper Extremity:    Deltoid: 5/5  Biceps: 5/5  Triceps: 5/5  Wrist Extension: 5/5  Finger Abduction: 5/5    Left Upper Extremity:    Deltoid: 5/5  Biceps: 5/5  Triceps: 5/5  Wrist Extension: 5/5  Finger Abduction: 5/5    Right Lower Extremity:    Hip Flexion: 5/5  Knee Extension: 5/5  Ankle Plantarflexion: 5/5  Ankle Dorsiflexion: 5/5        Left Lower Extremity:    Hip Flexion: 5/5  Knee Extension: 5/5  Ankle Plantarflexion: Unable to assess due to fracture  Ankle Dorsiflexion: Unable to assess due to fracture      SOMATOSENSORY:     Right Upper Extremity: normal light touch sensation  Left Upper Extremity: normal light touch sensation  Right Lower Extremity: normal light touch sensation  Left Lower Extremity: normal light touch sensation      REFLEXES:    Biceps: 2+  Patella: 2+    Carmona's: Negative      COORDINATION and GAIT:    Gait: Walking on crutches due to ankle fracture      DATA:    Lab Results   Component Value Date    WBC 4.9 12/02/2020    HGB 12.0 12/02/2020    HCT 37.8 12/02/2020    MCV 95.5 12/02/2020     12/02/2020     Lab Results   Component Value Date     12/02/2020    K 4.4 12/02/2020     12/02/2020    CO2 27 12/02/2020    BUN 15 12/02/2020    CREATININE 0.7 12/02/2020    GLUCOSE 90 12/02/2020    CALCIUM 9.3 12/02/2020    PROT 7.1 12/02/2020    LABALBU 4.1 12/02/2020    BILITOT <0.2 12/02/2020    ALKPHOS 79 12/02/2020    AST 17 12/02/2020    ALT 18 12/02/2020    LABGLOM >60 12/02/2020    GFRAA >59 12/02/2020    GLOB 3.2 09/01/2016     Lab Results   Component Value Date    INR 1.04 05/07/2019    INR 1.07 10/23/2017    PROTIME 13.0 05/07/2019    PROTIME 13.8 10/23/2017       No results found.       IMAGING: My interpretation of imaging studies: CT of the lumbar spine shows anatomic alignment with subtle degenerative changes in the lower lumbar spine. There is a questionable cortical deformity in the upper aspect of the L1 vertebrae but no significant height loss. There is no retropulsion of bone and the posterior elements are intact. ASSESSMENT AND PLAN:  This is a 50 y.o. female  who presents for neurosurgical evaluation of a possible L1 compression fracture following a fall down the stairs. She is neurologically intact and her pain pattern does not localize to L1 as she has primarily pain in her right buttock that appears muscular in nature. I am doubtful that she has an L1 fracture at all. I advised her that she may wear her brace PRN for support if she feels it is helpful, however she does not need to wear the brace for stability purposes. I provided her a prescription for flexeril as an alternative to zanaflex that she may find less sedating. I encouraged her to continue to follow up with Dr. Lisseth Hutchinson for her left foot/ankle fractures. She may follow up with me on an as-needed basis if she has new concerns or her pain worsens.             Aubrey Horvath MD

## 2020-12-30 RX ORDER — BUSPIRONE HYDROCHLORIDE 10 MG/1
TABLET ORAL
Qty: 60 TABLET | Refills: 5 | Status: SHIPPED | OUTPATIENT
Start: 2020-12-30 | End: 2021-01-04 | Stop reason: SDUPTHER

## 2021-01-04 DIAGNOSIS — F41.8 SITUATIONAL ANXIETY: ICD-10-CM

## 2021-01-06 RX ORDER — BUSPIRONE HYDROCHLORIDE 10 MG/1
TABLET ORAL
Qty: 180 TABLET | Refills: 1 | Status: SHIPPED | OUTPATIENT
Start: 2021-01-06

## 2021-01-16 DIAGNOSIS — S39.012A STRAIN OF LUMBAR REGION, INITIAL ENCOUNTER: ICD-10-CM

## 2021-01-18 DIAGNOSIS — F41.9 ANXIETY AND DEPRESSION: ICD-10-CM

## 2021-01-18 DIAGNOSIS — F32.A ANXIETY AND DEPRESSION: ICD-10-CM

## 2021-01-18 RX ORDER — CYCLOBENZAPRINE HCL 10 MG
TABLET ORAL
Qty: 90 TABLET | Refills: 0 | Status: SHIPPED | OUTPATIENT
Start: 2021-01-18 | End: 2021-10-20 | Stop reason: ALTCHOICE

## 2021-01-18 NOTE — TELEPHONE ENCOUNTER
Patient called and states that she was told by the pharmacy to call the doctors office for refills. Patient has refills of Lexapro on file and pharmacy will be called to see what the problem is.

## 2021-01-18 NOTE — TELEPHONE ENCOUNTER
Requested Prescriptions     Pending Prescriptions Disp Refills    cyclobenzaprine (FLEXERIL) 10 MG tablet [Pharmacy Med Name: CYCLOBENZAPRINE 10 MG TABLET] 90 tablet 0     Sig: TAKE 1 TABLET BY MOUTH THREE TIMES A DAY AS NEEDED FOR MUSCLE SPASMS       Last Office Visit: 12/23/2020  Next Office Visit: Visit date not found  Last Medication Refill: 12/23/20

## 2021-01-19 ENCOUNTER — OFFICE VISIT (OUTPATIENT)
Dept: GASTROENTEROLOGY | Age: 56
End: 2021-01-19
Payer: COMMERCIAL

## 2021-01-19 VITALS
SYSTOLIC BLOOD PRESSURE: 130 MMHG | WEIGHT: 220 LBS | OXYGEN SATURATION: 99 % | HEIGHT: 65 IN | BODY MASS INDEX: 36.65 KG/M2 | DIASTOLIC BLOOD PRESSURE: 80 MMHG | HEART RATE: 81 BPM

## 2021-01-19 DIAGNOSIS — R10.816 EPIGASTRIC ABDOMINAL TENDERNESS WITHOUT REBOUND TENDERNESS: ICD-10-CM

## 2021-01-19 DIAGNOSIS — R19.7 DIARRHEA, UNSPECIFIED TYPE: ICD-10-CM

## 2021-01-19 DIAGNOSIS — R12 CHRONIC HEARTBURN: Primary | ICD-10-CM

## 2021-01-19 DIAGNOSIS — K22.70 BARRETT'S ESOPHAGUS DETERMINED BY ENDOSCOPY: ICD-10-CM

## 2021-01-19 DIAGNOSIS — R11.2 NAUSEA AND VOMITING, INTRACTABILITY OF VOMITING NOT SPECIFIED, UNSPECIFIED VOMITING TYPE: ICD-10-CM

## 2021-01-19 PROCEDURE — 99203 OFFICE O/P NEW LOW 30 MIN: CPT | Performed by: NURSE PRACTITIONER

## 2021-01-19 RX ORDER — ESOMEPRAZOLE MAGNESIUM 40 MG/1
CAPSULE, DELAYED RELEASE ORAL
Qty: 30 CAPSULE | Refills: 11 | Status: SHIPPED | OUTPATIENT
Start: 2021-01-19 | End: 2021-03-25 | Stop reason: SDUPTHER

## 2021-01-19 ASSESSMENT — ENCOUNTER SYMPTOMS
COUGH: 0
DIARRHEA: 1
VOMITING: 1
BLOOD IN STOOL: 0
VOICE CHANGE: 0
NAUSEA: 1
SHORTNESS OF BREATH: 0
ANAL BLEEDING: 0
ABDOMINAL PAIN: 1
ABDOMINAL DISTENTION: 0
RECTAL PAIN: 0
BACK PAIN: 1
SORE THROAT: 0
TROUBLE SWALLOWING: 0
CONSTIPATION: 0

## 2021-01-19 NOTE — PATIENT INSTRUCTIONS

## 2021-01-19 NOTE — PROGRESS NOTES
Subjective:      Vesna Glass is a52 y.o. female  Chief Complaint   Patient presents with    New Patient     from PCP for diarrhea,  nausea, vomiting, heartburn       HPI  PCP: MICHELE Enriquez  Referring Provider: MICHELE Dykes  New pt referral.  For the following GI complaints. C/o change in bowel habits. Over the past few months she has had dirrhea. Prior to this she had chronic constipation. Has 3 diarrhea stools a day on average, occurs postprandially. Sometimes there is a little consistency to the stool , other times it is pure water. No blood in stool. Reports she has had a constant feeling of nausea since this started. And has a few episodes of vomiting. NSAIDS are on her med list, she reports she has no idea why, she doesnt take them. And no change in meds around the time this started. Is s/p krishna many years ago. Has heartburn,  Chronic for for 30 years. Is currently on Nexium 40mg daily. This used to work well. But when she wasn't able to get the name brand nexium her heartburn hasnt been controlled as much as it used to. So she added zantac. This helped til there was the warnings associated with it. Now taking mylanta in addition to the nexium and this helps. But still has some breakthrough heartburn. Hx of Barretts, per history in epic. Has not tried any other PPIs. C/o PADDY \"soreness. \"  Started a few weeks ago. Associated with bloating feeling. The soreness and tenderness is noted when she touches this area. GI scope reports in history per MA per OV policy, I reviewed this. Family HX:  Mother had colon polyps  Pt denies family hx of colon CA, inflammatory bowel dx, gastric CA and esophageal CA.     Past Medical History:   Diagnosis Date    Pereyra esophagus     Chest pressure 10/22/2017    10/14/2014  SE Positive for clinical myocardial ischemia 10/14/2017  Cath  Moderate diffuse disease in mid and distal LAD, normal LVFX  GERD (gastroesophageal reflux disease)     Hypertension     Obesity           Past Surgical History:   Procedure Laterality Date    CARDIAC CATHETERIZATION  10/14/13  JDT    EF 50%    CHOLECYSTECTOMY      COLONOSCOPY  01/12/2016    Dr Galen White- normal, suspect IB    HYSTERECTOMY      TONSILLECTOMY      UPPER GASTROINTESTINAL ENDOSCOPY  01/12/2016    Dr Adalberto Quintanilla:  Barretts esophagus       Social History     Socioeconomic History    Marital status:      Spouse name: None    Number of children: None    Years of education: None    Highest education level: None   Occupational History    None   Social Needs    Financial resource strain: None    Food insecurity     Worry: None     Inability: None    Transportation needs     Medical: None     Non-medical: None   Tobacco Use    Smoking status: Never Smoker    Smokeless tobacco: Never Used   Substance and Sexual Activity    Alcohol use:  Yes     Alcohol/week: 0.0 standard drinks     Comment: occ    Drug use: No    Sexual activity: None   Lifestyle    Physical activity     Days per week: None     Minutes per session: None    Stress: None   Relationships    Social connections     Talks on phone: None     Gets together: None     Attends [employer] service: None     Active member of club or organization: None     Attends meetings of clubs or organizations: None     Relationship status: None    Intimate partner violence     Fear of current or ex partner: None     Emotionally abused: None     Physically abused: None     Forced sexual activity: None   Other Topics Concern    None   Social History Narrative    None       Allergies   Allergen Reactions    Morphine Hives     IV Morphine    Penicillins Rash     As a child       Current Outpatient Medications   Medication Sig Dispense Refill    Simethicone (MYLANTA GAS PO) Take by mouth as needed (for acid reflux)  esomeprazole (NEXIUM) 40 MG delayed release capsule NAME BRAND ONLY Take 1 tablet by mouth every morning prior to breakfast 30 capsule 11    cyclobenzaprine (FLEXERIL) 10 MG tablet TAKE 1 TABLET BY MOUTH THREE TIMES A DAY AS NEEDED FOR MUSCLE SPASMS 90 tablet 0    busPIRone (BUSPAR) 10 MG tablet 1/2 - 1 tablet bid prn anxiety 180 tablet 1    vitamin D (ERGOCALCIFEROL) 1.25 MG (68695 UT) CAPS capsule Take 1 capsule by mouth once a week 4 capsule 2    rosuvastatin (CRESTOR) 10 MG tablet Take 1 tablet by mouth nightly 30 tablet 3    escitalopram (LEXAPRO) 10 MG tablet 1 TABLET DAILY FOR ANXIETY. NEVER ABRUPTLY STOP 90 tablet 3    albuterol sulfate HFA (PROVENTIL HFA) 108 (90 Base) MCG/ACT inhaler Inhale 2 puffs into the lungs every 6 hours as needed for Wheezing 1 Inhaler 5    tiZANidine (ZANAFLEX) 4 MG tablet Take 1 tablet by mouth 3 times daily as needed (muscle pain) 60 tablet 1    butalbital-acetaminophen-caffeine (FIORICET, ESGIC) -40 MG per tablet Take 1 tablet by mouth every 6 hours as needed for Headaches 60 tablet 0    ondansetron (ZOFRAN-ODT) 8 MG TBDP disintegrating tablet TAKE 1 TABLET BY MOUTH EVERY 8 HOURS AS NEEDED FOR NAUSEA AND VOMITING 9 tablet 2    SYMBICORT 160-4.5 MCG/ACT AERO 2 puffs 2 times daily as needed       ipratropium-albuterol (DUONEB) 0.5-2.5 (3) MG/3ML SOLN nebulizer solution INHALE 3MLS VIA NEBULIZER 4 TIMES DAILY AS NEEDED      ibuprofen (ADVIL;MOTRIN) 400 MG tablet Take 1 tablet by mouth 3 times daily (with meals) (Patient taking differently: Take 200 mg by mouth every 8 hours as needed for Pain or Fever ) 120 tablet 3    Respiratory Therapy Supplies DANA Renew cpap and supplies for continuation of HS use. 1 Device 0    CPAP Machine MISC by Does not apply route 1 each 0     No current facility-administered medications for this visit.         Review of Systems Constitutional: Positive for fatigue. Negative for appetite change, fever and unexpected weight change. HENT: Negative for sore throat, trouble swallowing and voice change. Respiratory: Negative for cough and shortness of breath. Cardiovascular: Negative for chest pain, palpitations and leg swelling. Gastrointestinal: Positive for abdominal pain, diarrhea, nausea and vomiting. Negative for abdominal distention, anal bleeding, blood in stool, constipation and rectal pain. Genitourinary: Negative for hematuria. Musculoskeletal: Positive for back pain. Negative for arthralgias and neck pain. Neurological: Negative for dizziness, weakness, light-headedness and headaches. Psychiatric/Behavioral: Positive for dysphoric mood. Negative for sleep disturbance. The patient is nervous/anxious. All other systems reviewed and are negative. Objective:     Physical Exam  Vitals signs and nursing note reviewed. Constitutional:       Appearance: She is well-developed. Comments: /80   Pulse 81   Ht 5' 5\" (1.651 m)   Wt 220 lb (99.8 kg)   SpO2 99%   BMI 36.61 kg/m²    Eyes:      General: No scleral icterus. Conjunctiva/sclera: Conjunctivae normal.      Pupils: Pupils are equal, round, and reactive to light. Neck:      Musculoskeletal: Normal range of motion and neck supple. Thyroid: No thyromegaly. Cardiovascular:      Rate and Rhythm: Normal rate and regular rhythm. Heart sounds: Normal heart sounds. No murmur. No friction rub. No gallop. Pulmonary:      Effort: Pulmonary effort is normal. No respiratory distress. Breath sounds: Normal breath sounds. Abdominal:      General: Bowel sounds are normal. There is no distension. Palpations: Abdomen is soft. Tenderness: There is no abdominal tenderness. There is no rebound. Musculoskeletal: Normal range of motion. General: No deformity.    Neurological: Mental Status: She is alert and oriented to person, place, and time. Cranial Nerves: No cranial nerve deficit. Psychiatric:         Judgment: Judgment normal.           Assessment:       Diagnosis Orders   1. Chronic heartburn  esomeprazole (NEXIUM) 40 MG delayed release capsule    ESOPHAGOSCOPY / EGD   2. Pereyra's esophagus determined by endoscopy  esomeprazole (NEXIUM) 40 MG delayed release capsule    ESOPHAGOSCOPY / EGD   3. Diarrhea, unspecified type  COLONOSCOPY W/ OR W/O BIOPSY    ESOPHAGOSCOPY / EGD   4. Nausea and vomiting, intractability of vomiting not specified, unspecified vomiting type  ESOPHAGOSCOPY / EGD   5. Epigastric abdominal tenderness without rebound tenderness  ESOPHAGOSCOPY / EGD         Plan:      1. Sent pt home with diatherix stool test to r/o infectious etiology  2. escribed Nexium, name brand only. , to see if this helps. If not covered will escribe different PPI  3. Schedule outpatient endoscopy- barretts bx, r/o celiac and h pylori as well please. Patient advised no Aspirin, Fish Oil, Vit E or NSAIDs 5 (five) days before procedure. Follow-up Visit: per   Pt Education:   Risks, benefits, and alternatives to endoscopy were discussed. Patient voices understanding of risks of, but not limited to, perforation, bleeding, and infection. The risk of perforation is increased with esophageal dilatation. All questions answered to patient's satisfaction. Patient is agreable to proceed. 4. Schedule outpatient colonoscopy with random colon bx. Patient advised no Aspirin, Fish Oil, Vit E or NSAIDs 5 (five) days before procedure. Follow-up Visit: per Dr Annie Sanchez  Pt education:  Risks, benefits, and alternatives to colonoscopy were discussed. Risks of colonoscopy include, but are not limited to, perforation, bleeding, and infection. We discussed that the risk for perforation is 1-3 in 5,000  at the time of colonoscopy;   and 1-2% risk of bleeding post-polypectomy. All questions answered to the satisfaction of the patient. Pt is agreeable to proceed. 5. F/u after scopes to discuss results and note med efficacy of name brand nexium.

## 2021-01-21 ENCOUNTER — OFFICE VISIT (OUTPATIENT)
Age: 56
End: 2021-01-21

## 2021-01-21 ENCOUNTER — ANESTHESIA EVENT (OUTPATIENT)
Dept: OPERATING ROOM | Age: 56
End: 2021-01-21

## 2021-01-21 VITALS — TEMPERATURE: 97.9 F | OXYGEN SATURATION: 97 % | HEART RATE: 69 BPM

## 2021-01-21 DIAGNOSIS — Z11.59 SCREENING FOR VIRAL DISEASE: Primary | ICD-10-CM

## 2021-01-21 PROCEDURE — 99999 PR OFFICE/OUTPT VISIT,PROCEDURE ONLY: CPT | Performed by: NURSE PRACTITIONER

## 2021-01-22 LAB — SARS-COV-2, NAA: NOT DETECTED

## 2021-01-25 ENCOUNTER — APPOINTMENT (OUTPATIENT)
Dept: OPERATING ROOM | Age: 56
End: 2021-01-25

## 2021-01-25 ENCOUNTER — HOSPITAL ENCOUNTER (OUTPATIENT)
Age: 56
Setting detail: OUTPATIENT SURGERY
Discharge: HOME | End: 2021-01-25
Attending: INTERNAL MEDICINE | Admitting: INTERNAL MEDICINE
Payer: COMMERCIAL

## 2021-01-25 ENCOUNTER — ANESTHESIA (OUTPATIENT)
Dept: OPERATING ROOM | Age: 56
End: 2021-01-25

## 2021-01-25 VITALS
DIASTOLIC BLOOD PRESSURE: 55 MMHG | RESPIRATION RATE: 16 BRPM | BODY MASS INDEX: 36.65 KG/M2 | HEIGHT: 65 IN | HEART RATE: 65 BPM | SYSTOLIC BLOOD PRESSURE: 106 MMHG | TEMPERATURE: 98.7 F | WEIGHT: 220 LBS | OXYGEN SATURATION: 99 %

## 2021-01-25 VITALS — SYSTOLIC BLOOD PRESSURE: 107 MMHG | TEMPERATURE: 98 F | DIASTOLIC BLOOD PRESSURE: 71 MMHG | OXYGEN SATURATION: 94 %

## 2021-01-25 PROCEDURE — 43239 EGD BIOPSY SINGLE/MULTIPLE: CPT | Performed by: INTERNAL MEDICINE

## 2021-01-25 PROCEDURE — 45380 COLONOSCOPY AND BIOPSY: CPT

## 2021-01-25 PROCEDURE — 43239 EGD BIOPSY SINGLE/MULTIPLE: CPT

## 2021-01-25 PROCEDURE — 45380 COLONOSCOPY AND BIOPSY: CPT | Performed by: INTERNAL MEDICINE

## 2021-01-25 RX ORDER — SODIUM CHLORIDE 9 MG/ML
INJECTION, SOLUTION INTRAVENOUS CONTINUOUS
Status: DISCONTINUED | OUTPATIENT
Start: 2021-01-25 | End: 2021-01-25 | Stop reason: HOSPADM

## 2021-01-25 RX ORDER — PROPOFOL 10 MG/ML
INJECTION, EMULSION INTRAVENOUS PRN
Status: DISCONTINUED | OUTPATIENT
Start: 2021-01-25 | End: 2021-01-25 | Stop reason: SDUPTHER

## 2021-01-25 RX ADMIN — SODIUM CHLORIDE: 9 INJECTION, SOLUTION INTRAVENOUS at 13:04

## 2021-01-25 RX ADMIN — PROPOFOL 50 MG: 10 INJECTION, EMULSION INTRAVENOUS at 13:23

## 2021-01-25 RX ADMIN — PROPOFOL 50 MG: 10 INJECTION, EMULSION INTRAVENOUS at 13:14

## 2021-01-25 RX ADMIN — PROPOFOL 150 MG: 10 INJECTION, EMULSION INTRAVENOUS at 13:09

## 2021-01-25 RX ADMIN — PROPOFOL 50 MG: 10 INJECTION, EMULSION INTRAVENOUS at 13:18

## 2021-01-25 NOTE — H&P
Patient Name: Anh Ho  : 1965  MRN: [id number]  DATE: 21    Allergies: Allergies   Allergen Reactions    Morphine Hives     IV Morphine    Penicillins Rash     As a child        ENDOSCOPY  History and Physical    Procedure:    [x] Diagnostic Colonoscopy       [] Screening Colonoscopy  [x] EGD      [] ERCP      [] EUS       [] Other    [x] Previous office notes/History and Physical reviewed from the patients chart. Please see EMR for further details of HPI. I have examined the patient's status immediately prior to the procedure and:      Indications/HPI:    []Abdominal Pain   []Cancer- GI/Lung     [x]Fhx of colon CA/polyps  []History of Polyps  [x]Barretts            []Melena  []Abnormal Imaging              []Dysphagia              []Persistent Pneumonia   []Anemia                            []Food Impaction        []History of Polyps  [] GI Bleed             []Pulmonary nodule/Mass   [x]Change in bowel habits [x]Heartburn/Reflux  []Rectal Bleed (BRBPR)  []Chest Pain - Non Cardiac []Heme (+) Stool []Ulcers  []Constipation  []Hemoptysis  []Varices  []Diarrhea  []Hypoxemia    []Nausea/Vomiting   []Screening   []Crohns/Colitis  []Other:     Anesthesia:   [x] MAC [] Moderate Sedation   [] General   [] None     ROS: 12 pt Review of Symptoms was negative unless mentioned above    Medications:   Prior to Admission medications    Medication Sig Start Date End Date Taking?  Authorizing Provider   esomeprazole (NEXIUM) 40 MG delayed release capsule NAME BRAND ONLY Take 1 tablet by mouth every morning prior to breakfast 21  Yes MICHELE More   cyclobenzaprine (FLEXERIL) 10 MG tablet TAKE 1 TABLET BY MOUTH THREE TIMES A DAY AS NEEDED FOR MUSCLE SPASMS 21  Yes Gabi Cedillo MD   busPIRone (BUSPAR) 10 MG tablet 1/2 - 1 tablet bid prn anxiety 21  Yes MICHELE Olea vitamin D (ERGOCALCIFEROL) 1.25 MG (49395 UT) CAPS capsule Take 1 capsule by mouth once a week 12/22/20  Yes MICHELE Obando   rosuvastatin (CRESTOR) 10 MG tablet Take 1 tablet by mouth nightly 12/22/20  Yes MICHELE Obando   escitalopram (LEXAPRO) 10 MG tablet 1 TABLET DAILY FOR ANXIETY. NEVER ABRUPTLY STOP 12/2/20  Yes MICHELE Obando   tiZANidine (ZANAFLEX) 4 MG tablet Take 1 tablet by mouth 3 times daily as needed (muscle pain) 12/2/20  Yes MICHELE Obando   ondansetron (ZOFRAN-ODT) 8 MG TBDP disintegrating tablet TAKE 1 TABLET BY MOUTH EVERY 8 HOURS AS NEEDED FOR NAUSEA AND VOMITING 9/21/20  Yes MICHELE Obando   Simethicone (MYLANTA GAS PO) Take by mouth as needed (for acid reflux)    Historical Provider, MD   albuterol sulfate HFA (PROVENTIL HFA) 108 (90 Base) MCG/ACT inhaler Inhale 2 puffs into the lungs every 6 hours as needed for Wheezing 12/2/20   IMCHELE Obando   butalbital-acetaminophen-caffeine (FIORICET, ESGIC) -40 MG per tablet Take 1 tablet by mouth every 6 hours as needed for Headaches 12/2/20   MICHELE Camarillo   SYMBICORT 160-4.5 MCG/ACT AERO 2 puffs 2 times daily as needed  3/27/20   Historical Provider, MD   ipratropium-albuterol (DUONEB) 0.5-2.5 (3) MG/3ML SOLN nebulizer solution INHALE 3MLS VIA NEBULIZER 4 TIMES DAILY AS NEEDED 3/3/20   Historical Provider, MD   ibuprofen (ADVIL;MOTRIN) 400 MG tablet Take 1 tablet by mouth 3 times daily (with meals)  Patient taking differently: Take 200 mg by mouth every 8 hours as needed for Pain or Fever  4/2/19   Dionicio Mcmahan MD   Respiratory Therapy Supplies DANA Renew cpap and supplies for continuation of HS use.  3/1/18   MICHELE Camarillo   CPAP Machine MISC by Does not apply route 10/1/16   MICHELE Avila       Past Medical History:  Past Medical History:   Diagnosis Date    Pereyra esophagus     Chest pressure 10/22/2017 10/14/2014  SE Positive for clinical myocardial ischemia 10/14/2017  Cath  Moderate diffuse disease in mid and distal LAD, normal LVFX     GERD (gastroesophageal reflux disease)     Hypertension     Obesity     Sleep apnea     sleeps with c pap       Past Surgical History:  Past Surgical History:   Procedure Laterality Date    CARDIAC CATHETERIZATION  10/14/13  JDT    EF 50%    CHOLECYSTECTOMY      COLONOSCOPY  01/12/2016    Dr Droado Modest- normal, suspect IB    HYSTERECTOMY      TONSILLECTOMY      UPPER GASTROINTESTINAL ENDOSCOPY  01/12/2016    Dr Pedro Pablo Payne:  Barretts esophagus       Social History:  Social History     Tobacco Use    Smoking status: Never Smoker    Smokeless tobacco: Never Used   Substance Use Topics    Alcohol use: Yes     Alcohol/week: 0.0 standard drinks     Comment: occ    Drug use: No       Vital Signs:   Vitals:    01/25/21 1302   BP: (!) 143/82   Pulse: 91   Resp: 16   Temp: 98.7 °F (37.1 °C)   SpO2: 96%        Physical Exam:  Cardiac:  [x]WNL  []Comments:  Pulmonary:  [x]WNL   []Comments:  Neuro/Mental Status:  [x]WNL  []Comments:  Abdominal:  [x]WNL    []Comments:  Other:   []WNL  []Comments:    Informed Consent:  The risks and benefits of the procedure have been discussed with either the patient or if they cannot consent, their representative. Assessment:  Patient examined and appropriate for planned sedation and procedure. Plan:  Proceed with planned sedation and procedure as above. Levie Litten, am scribing for and in the presence of Dr. Rocio Parish MD.  Electronically signed by April Hensley RN on 1/25/2021 at 1:08 PM    I personally performed the services described in this documentation as scribed by Brennon Han, and it appears accurate and complete.      Rocio Parish MD  1/25/2021

## 2021-01-25 NOTE — ANESTHESIA POSTPROCEDURE EVALUATION
Department of Anesthesiology  Postprocedure Note    Patient: Deena Connolly  MRN: [id number]  YOB: 1965  Date of evaluation: 1/25/2021  Time:  1:33 PM     Procedure Summary     Date: 01/25/21 Room / Location: [facility] ENDO 01 / [address]    Anesthesia Start: 2398 Anesthesia Stop:     Procedures:       EGD BIOPSY (N/A Esophagus)      COLONOSCOPY WITH BIOPSY (N/A Abdomen) Diagnosis: (CHRONIC HEARTBURN, BARRETTS, ABD PAIN, DIARRHEA)    Surgeons: Samantha Whitmore MD Responsible Provider: MICHELE Beavers CRNA    Anesthesia Type: MAC ASA Status: 3          Anesthesia Type: MAC    Garcia Phase I:      Garcia Phase II:      Last vitals: Reviewed and per EMR flowsheets.        Anesthesia Post Evaluation    Patient location during evaluation: bedside  Patient participation: complete - patient participated  Level of consciousness: awake  Pain score: 0  Airway patency: patent  Nausea & Vomiting: no vomiting and no nausea  Complications: no  Cardiovascular status: blood pressure returned to baseline  Respiratory status: acceptable  Hydration status: stable

## 2021-01-25 NOTE — ANESTHESIA PRE PROCEDURE
SYMBICORT 160-4.5 MCG/ACT AERO 2 puffs 2 times daily as needed  3/27/20   Historical Provider, MD   ipratropium-albuterol (DUONEB) 0.5-2.5 (3) MG/3ML SOLN nebulizer solution INHALE 3MLS VIA NEBULIZER 4 TIMES DAILY AS NEEDED 3/3/20   Historical Provider, MD   ibuprofen (ADVIL;MOTRIN) 400 MG tablet Take 1 tablet by mouth 3 times daily (with meals)  Patient taking differently: Take 200 mg by mouth every 8 hours as needed for Pain or Fever  4/2/19   Ann Curran MD   Respiratory Therapy Supplies DANA Renew cpap and supplies for continuation of HS use. 3/1/18   MICHELE Obando   CPAP Machine MISC by Does not apply route 10/1/16   MICHELE Knowles       Current medications:    Current Facility-Administered Medications   Medication Dose Route Frequency Provider Last Rate Last Admin    0.9 % sodium chloride infusion   Intravenous Continuous Kam Collins MD           Allergies:     Allergies   Allergen Reactions    Morphine Hives     IV Morphine    Penicillins Rash     As a child       Problem List:    Patient Active Problem List   Diagnosis Code    Pereyra esophagus K22.70    Chest pressure R07.89    Multiple rib fractures involving first rib S22.49XA    Closed fracture of rib of right side S22.31XA    Chronic heartburn R12    Pereyra's esophagus determined by endoscopy K22.70    Diarrhea R19.7    Nausea and vomiting R11.2    Epigastric abdominal tenderness without rebound tenderness R10.816       Past Medical History:        Diagnosis Date    Pereyra esophagus     Chest pressure 10/22/2017    10/14/2014  SE Positive for clinical myocardial ischemia 10/14/2017  Cath  Moderate diffuse disease in mid and distal LAD, normal LVFX     GERD (gastroesophageal reflux disease)     Hypertension     Obesity        Past Surgical History:        Procedure Laterality Date    CARDIAC CATHETERIZATION  10/14/13  JDT    EF 50%    CHOLECYSTECTOMY      COLONOSCOPY  01/12/2016    Dr Dameon Carmona- normal, suspect IB  HYSTERECTOMY      TONSILLECTOMY      UPPER GASTROINTESTINAL ENDOSCOPY  01/12/2016    Dr Meera Davis:  Barretts esophagus       Social History:    Social History     Tobacco Use    Smoking status: Never Smoker    Smokeless tobacco: Never Used   Substance Use Topics    Alcohol use: Yes     Alcohol/week: 0.0 standard drinks     Comment: occ                                Counseling given: Not Answered      Vital Signs (Current): There were no vitals filed for this visit. BP Readings from Last 3 Encounters:   01/19/21 130/80   12/23/20 (!) 141/80   12/19/20 (!) 141/77       NPO Status:                                                                                 BMI:   Wt Readings from Last 3 Encounters:   01/19/21 220 lb (99.8 kg)   12/23/20 225 lb (102.1 kg)   12/19/20 225 lb (102.1 kg)     There is no height or weight on file to calculate BMI.    CBC:   Lab Results   Component Value Date    WBC 4.9 12/02/2020    RBC 3.96 12/02/2020    HGB 12.0 12/02/2020    HCT 37.8 12/02/2020    MCV 95.5 12/02/2020    RDW 14.1 12/02/2020     12/02/2020       CMP:   Lab Results   Component Value Date     12/02/2020    K 4.4 12/02/2020     12/02/2020    CO2 27 12/02/2020    BUN 15 12/02/2020    CREATININE 0.7 12/02/2020    GFRAA >59 12/02/2020    LABGLOM >60 12/02/2020    GLUCOSE 90 12/02/2020    PROT 7.1 12/02/2020    CALCIUM 9.3 12/02/2020    BILITOT <0.2 12/02/2020    ALKPHOS 79 12/02/2020    AST 17 12/02/2020    ALT 18 12/02/2020       POC Tests: No results for input(s): POCGLU, POCNA, POCK, POCCL, POCBUN, POCHEMO, POCHCT in the last 72 hours.     Coags:   Lab Results   Component Value Date    PROTIME 13.0 05/07/2019    INR 1.04 05/07/2019    APTT 27.1 05/07/2019       HCG (If Applicable):   Lab Results   Component Value Date    PREGTESTUR NEGATIVE 11/30/2014        ABGs:   Lab Results   Component Value Date    PHART 7.390 03/30/2019    PO2ART 55.0 03/30/2019 EBQ6HKS 39.0 03/30/2019    TGT3AMO 23.6 03/30/2019    BEART -1.2 03/30/2019    B3SQZYGC 90.2 03/30/2019        Type & Screen (If Applicable):  No results found for: LABABO, LABRH    Drug/Infectious Status (If Applicable):  No results found for: HIV, HEPCAB    COVID-19 Screening (If Applicable):   Lab Results   Component Value Date    COVID19 NOT DETECTED 01/21/2021         Anesthesia Evaluation  Patient summary reviewed and Nursing notes reviewed no history of anesthetic complications:   Airway: Mallampati: II        Dental:          Pulmonary:                              Cardiovascular:    (+) hypertension:,         Rhythm: regular  Rate: normal                    Neuro/Psych:               GI/Hepatic/Renal:   (+) GERD:,           Endo/Other:                     Abdominal:           Vascular:                                        Anesthesia Plan      MAC     ASA 3       Induction: intravenous. Anesthetic plan and risks discussed with patient.         Attending anesthesiologist reviewed and agrees with Sagar Sierra 38, APRN - CRNA   1/25/2021

## 2021-01-25 NOTE — OP NOTE
Patient: Katherine Tomas : 1965  Med Rec#: [id number] Acc#: [id number]   Primary Care Provider MICHELE Ball    Date of Procedure:  2021    Endoscopist: Kimberly Mckeon MD    Referring Provider: MICHELE Ball, MICHELE Farah    Operation Performed: Colonoscopy with biopsy    Indications: change in bowel habits, family hx of colon polyps    Anesthesia:  Sedation was administered by anesthesia who monitored the patient during the procedure. I met with Katherine Tomas prior to procedure. We discussed the procedure itself, and I have discussed the risks of endoscopy (including-- but not limited to-- pain, discomfort, bleeding potentially requiring second endoscopic procedure and/or blood transfusion, organ perforation requiring operative repair, damage to organs near the colon, infection, aspiration, cardiopulmonary/allergic reaction), benefits, indications to endoscopy. Additionally, we discussed options other than colonoscopy. The patient expressed understanding. All questions answered. The patient decided to proceed with the procedure. Signed informed consent was placed on the chart. Blood Loss: minimal    Withdrawal time: > 6 min  Bowel Prep: adequate     Complications: no immediate complications    DESCRIPTION OF PROCEDURE:     A time out was performed. After written informed consent was obtained, the patient was placed in the left lateral position. The perianal area was inspected, and a digital rectal exam was performed. A rectal exam was performed: normal tone, no palpable lesions. At this point, a forward viewing Olympus colonoscope was inserted into the anus and carefully advanced to the cecum. The cecum was identified by the ileocecal valve and the appendiceal orifice. The colonoscope was then slowly withdrawn with careful inspection of the mucosa in a linear and circumferential fashion. The scope was retroflexed in the rectum. Suction was utilized during the procedure to remove as much air as possible from the bowel. The colonoscope was removed from the patient, and the procedure was terminated. Findings are listed below. Findings: The mucosa appeared normal throughout the entire examined colon. Random colon biopsies taken to rule out microscopic colitis. Retroflexion in the rectum was normal and revealed no further abnormalities. Recommendations:  1. Repeat colonoscopy: pending pathology - 5 years, given family hx  2. Await biopsy results-you will receive a letter with your results. Findings and recommendations were discussed w/ the patient. A copy of the images was provided. Momo Cannon am scribing for and in the presence of Dr. Manjit Li MD.  Electronically signed by Katrin Andersen RN on 1/25/2021 at 1:22 PM    I personally performed the services described in this documentation as scribed by Amy Lujan, and it appears accurate and complete.      Manjit Li MD  1/25/2021

## 2021-01-25 NOTE — OP NOTE
Endoscopic Procedure Note    Patient: Vesna Glass : 1965  [facility] Rec#: [id number] Acc#: [id number]     Primary Care Provider MICHELE Enriquez  Referring Provider: MICHELE Linton    Endoscopist: Keily Velez MD    Date of Procedure:  2021    Procedure:   1. EGD with biopsy    Indications:   1. Hx of Pereyra's esophagus  2. Reflux      Anesthesia:  Sedation was administered by anesthesia who monitored the patient during the procedure. Estimated Blood Loss: minimal    Procedure:   After reviewing the patient's chart and obtaining informed consent, the patient was placed in the left lateral decubitus position. A forward-viewing Olympus endoscope was lubricated and inserted through the mouth into the oropharynx. Under direct visualization, the upper esophagus was intubated. The scope was advanced to the level of the third portion of duodenum. Scope was slowly withdrawn with careful inspection of the mucosal surfaces. The scope was retroflexed for inspection of the gastric fundus and incisura. Findings and maneuvers are listed in impression below. The patient tolerated the procedure well. The scope was removed. There were no immediate complications. Findings:   Esophagus: Abnormal: there were mucosal changes in the distal esophagus consistent with Pereyra's- biopsied  There is a 3 cm hiatal hernia present. Stomach:  Abnormal: Mild mucosal changes suggestive of gastritis noted -  Gastric biopsies were taken from the antrum and body to rule out Helicobacter pylori infection. Duodenum: Normal      IMPRESSION:  1. Gastritis- biopsied   2. Hx of Pereyra's- biopsied      RECOMMENDATIONS:    1. Await path results, the patient will be contacted in 7-10 days with biopsy results. 2.  Continue PPI daily  3. Avoid NSAID use      The results were discussed with the patient and family. A copy of the images obtained were given to the patient. Eber Harris am scribing for and in the presence of Dr. Peewee Garcia MD.  Electronically signed by Alison Dill RN on 1/25/2021 at 1:11 PM    I personally performed the services described in this documentation as scribed by Kaz Kruse, and it appears accurate and complete.      Danielle Gallagher MD  1/25/2021

## 2021-01-26 ENCOUNTER — HOSPITAL ENCOUNTER (OUTPATIENT)
Age: 56
Setting detail: SPECIMEN
Discharge: HOME | End: 2021-01-26
Payer: COMMERCIAL

## 2021-01-26 ENCOUNTER — TELEPHONE (OUTPATIENT)
Dept: GASTROENTEROLOGY | Age: 56
End: 2021-01-26

## 2021-01-26 DIAGNOSIS — R12 CHRONIC HEARTBURN: Primary | ICD-10-CM

## 2021-01-26 DIAGNOSIS — K22.70 BARRETT'S ESOPHAGUS DETERMINED BY ENDOSCOPY: ICD-10-CM

## 2021-01-26 PROCEDURE — 88305 TISSUE EXAM BY PATHOLOGIST: CPT

## 2021-01-26 PROCEDURE — 88342 IMHCHEM/IMCYTCHM 1ST ANTB: CPT

## 2021-01-26 RX ORDER — PANTOPRAZOLE SODIUM 40 MG/1
40 TABLET, DELAYED RELEASE ORAL
Qty: 30 TABLET | Refills: 11 | Status: SHIPPED | OUTPATIENT
Start: 2021-01-26 | End: 2021-03-25 | Stop reason: ALTCHOICE

## 2021-01-26 NOTE — TELEPHONE ENCOUNTER
Please let pt know the insurance denied name brand Nexium because she didn't have serious allergy to generic. Will escribe protonix to see if this is helpful for her.

## 2021-03-10 DIAGNOSIS — E55.9 VITAMIN D DEFICIENCY: ICD-10-CM

## 2021-03-11 RX ORDER — ERGOCALCIFEROL 1.25 MG/1
CAPSULE ORAL
Qty: 12 CAPSULE | Refills: 0 | Status: SHIPPED | OUTPATIENT
Start: 2021-03-11 | End: 2021-06-07

## 2021-03-17 DIAGNOSIS — E78.5 HYPERLIPIDEMIA, UNSPECIFIED HYPERLIPIDEMIA TYPE: ICD-10-CM

## 2021-03-17 RX ORDER — ROSUVASTATIN CALCIUM 10 MG/1
TABLET, COATED ORAL
Qty: 90 TABLET | Refills: 1 | Status: SHIPPED | OUTPATIENT
Start: 2021-03-17 | End: 2021-09-13

## 2021-03-24 ENCOUNTER — PATIENT MESSAGE (OUTPATIENT)
Dept: FAMILY MEDICINE CLINIC | Age: 56
End: 2021-03-24

## 2021-03-24 DIAGNOSIS — R12 CHRONIC HEARTBURN: ICD-10-CM

## 2021-03-24 DIAGNOSIS — K22.70 BARRETT'S ESOPHAGUS DETERMINED BY ENDOSCOPY: ICD-10-CM

## 2021-03-25 NOTE — TELEPHONE ENCOUNTER
From: Amy Craig  To: Georgana Kocher, APRN  Sent: 3/24/2021 7:48 PM CDT  Subject: Prescription Question    Can I please have Nexium 40mg BID called into [facility]? My GI had tried me on Protonix and it just doesn't work as well. Please order Nexium 40mg TWICE A DAY, 3 months at a time. Thank you!!   Brad Serna

## 2021-03-26 RX ORDER — ESOMEPRAZOLE MAGNESIUM 40 MG/1
40 CAPSULE, DELAYED RELEASE ORAL 2 TIMES DAILY
Qty: 180 CAPSULE | Refills: 1 | Status: SHIPPED | OUTPATIENT
Start: 2021-03-26 | End: 2021-03-30

## 2021-03-29 ENCOUNTER — PATIENT MESSAGE (OUTPATIENT)
Dept: FAMILY MEDICINE CLINIC | Age: 56
End: 2021-03-29

## 2021-03-29 DIAGNOSIS — B37.2 SKIN YEAST INFECTION: Primary | ICD-10-CM

## 2021-03-30 DIAGNOSIS — K22.70 BARRETT'S ESOPHAGUS DETERMINED BY ENDOSCOPY: ICD-10-CM

## 2021-03-30 DIAGNOSIS — R12 CHRONIC HEARTBURN: ICD-10-CM

## 2021-03-30 RX ORDER — NYSTATIN 100000 [USP'U]/G
POWDER TOPICAL
Qty: 60 G | Refills: 1 | Status: SHIPPED | OUTPATIENT
Start: 2021-03-30 | End: 2022-02-24

## 2021-03-30 RX ORDER — ESOMEPRAZOLE MAGNESIUM 40 MG/1
40 CAPSULE, DELAYED RELEASE ORAL 2 TIMES DAILY
Qty: 180 CAPSULE | Refills: 1 | Status: SHIPPED | OUTPATIENT
Start: 2021-03-30 | End: 2021-09-17 | Stop reason: SDUPTHER

## 2021-03-30 NOTE — TELEPHONE ENCOUNTER
From: Ether Oz  To: Karen Garcia MD  Sent: 3/29/2021 10:36 PM CDT  Subject: Prescription Question    I was wondering if you could order me a bottle of Nystatin powder 100,000 units/gram? I have a large belly apron with a damp, red rash under it. It always gets this way in the warmer months and Nystatin usually clears it up better than anything. Thank you!

## 2021-05-17 DIAGNOSIS — Z87.898 HISTORY OF HEADACHE: ICD-10-CM

## 2021-05-17 RX ORDER — ONDANSETRON 8 MG/1
TABLET, ORALLY DISINTEGRATING ORAL
Qty: 9 TABLET | Refills: 2 | Status: SHIPPED | OUTPATIENT
Start: 2021-05-17 | End: 2022-02-24

## 2021-05-17 RX ORDER — BUTALBITAL, ACETAMINOPHEN AND CAFFEINE 50; 325; 40 MG/1; MG/1; MG/1
1 TABLET ORAL EVERY 6 HOURS PRN
Qty: 60 TABLET | Refills: 0 | Status: SHIPPED | OUTPATIENT
Start: 2021-05-17 | End: 2021-08-09

## 2021-05-21 RX ORDER — TIZANIDINE 4 MG/1
4 TABLET ORAL 3 TIMES DAILY PRN
Qty: 60 TABLET | Refills: 1 | Status: SHIPPED | OUTPATIENT
Start: 2021-05-21 | End: 2021-08-09

## 2021-06-06 DIAGNOSIS — E55.9 VITAMIN D DEFICIENCY: ICD-10-CM

## 2021-06-07 RX ORDER — ERGOCALCIFEROL 1.25 MG/1
CAPSULE ORAL
Qty: 12 CAPSULE | Refills: 0 | Status: SHIPPED | OUTPATIENT
Start: 2021-06-07 | End: 2021-09-08

## 2021-06-29 ENCOUNTER — HOSPITAL ENCOUNTER (OUTPATIENT)
Dept: GENERAL RADIOLOGY | Age: 56
Discharge: HOME | End: 2021-06-29
Payer: COMMERCIAL

## 2021-06-29 ENCOUNTER — OFFICE VISIT (OUTPATIENT)
Dept: FAMILY MEDICINE CLINIC | Age: 56
End: 2021-06-29
Payer: COMMERCIAL

## 2021-06-29 VITALS
SYSTOLIC BLOOD PRESSURE: 144 MMHG | HEART RATE: 78 BPM | DIASTOLIC BLOOD PRESSURE: 86 MMHG | WEIGHT: 231 LBS | BODY MASS INDEX: 38.44 KG/M2 | OXYGEN SATURATION: 98 % | TEMPERATURE: 98.2 F

## 2021-06-29 DIAGNOSIS — J40 BRONCHITIS: Primary | ICD-10-CM

## 2021-06-29 DIAGNOSIS — J40 BRONCHITIS: ICD-10-CM

## 2021-06-29 PROCEDURE — 99214 OFFICE O/P EST MOD 30 MIN: CPT | Performed by: FAMILY MEDICINE

## 2021-06-29 PROCEDURE — 94642 AEROSOL INHALATION TREATMENT: CPT | Performed by: FAMILY MEDICINE

## 2021-06-29 PROCEDURE — 71046 X-RAY EXAM CHEST 2 VIEWS: CPT

## 2021-06-29 PROCEDURE — 96372 THER/PROPH/DIAG INJ SC/IM: CPT | Performed by: FAMILY MEDICINE

## 2021-06-29 RX ORDER — TRIAMCINOLONE ACETONIDE 40 MG/ML
40 INJECTION, SUSPENSION INTRA-ARTICULAR; INTRAMUSCULAR ONCE
Status: COMPLETED | OUTPATIENT
Start: 2021-06-29 | End: 2021-06-29

## 2021-06-29 RX ORDER — DEXAMETHASONE SODIUM PHOSPHATE 10 MG/ML
4 INJECTION INTRAMUSCULAR; INTRAVENOUS ONCE
Status: COMPLETED | OUTPATIENT
Start: 2021-06-29 | End: 2021-06-29

## 2021-06-29 RX ORDER — PREDNISONE 20 MG/1
20 TABLET ORAL 2 TIMES DAILY
Qty: 10 TABLET | Refills: 0 | Status: SHIPPED | OUTPATIENT
Start: 2021-06-29 | End: 2021-07-04

## 2021-06-29 RX ORDER — CEFDINIR 300 MG/1
300 CAPSULE ORAL 2 TIMES DAILY
Qty: 20 CAPSULE | Refills: 0 | Status: SHIPPED | OUTPATIENT
Start: 2021-06-29 | End: 2021-07-09

## 2021-06-29 RX ADMIN — TRIAMCINOLONE ACETONIDE 40 MG: 40 INJECTION, SUSPENSION INTRA-ARTICULAR; INTRAMUSCULAR at 11:25

## 2021-06-29 RX ADMIN — DEXAMETHASONE SODIUM PHOSPHATE 4 MG: 10 INJECTION INTRAMUSCULAR; INTRAVENOUS at 11:27

## 2021-06-29 ASSESSMENT — ENCOUNTER SYMPTOMS
COUGH: 1
ALLERGIC/IMMUNOLOGIC NEGATIVE: 1
GASTROINTESTINAL NEGATIVE: 1
EYES NEGATIVE: 1

## 2021-06-29 NOTE — PROGRESS NOTES
SUBJECTIVE:    Patient ID: Ciera Cook is a 49 y. o.female. HPI:   Patient here for evaluation of an upper respiratory infection  Patient is a 61-year-old white female. She complains of sore throat cough congestion and wheezes. She has history of asthma. Albuterol helps. She is also on Advair. Family history of lung cancer. Never smoker. Denies any nausea or vomiting. She was tested for COVID-19 yesterday and today at work. She works as a nurse. She is fully vaccinated.        Past Medical History:   Diagnosis Date    Pereyra esophagus     Chest pressure 10/22/2017    10/14/2014  SE Positive for clinical myocardial ischemia 10/14/2017  Cath  Moderate diffuse disease in mid and distal LAD, normal LVFX     GERD (gastroesophageal reflux disease)     Hypertension     Obesity     Sleep apnea     sleeps with c pap      Current Outpatient Medications   Medication Sig Dispense Refill    cefdinir (OMNICEF) 300 MG capsule Take 1 capsule by mouth 2 times daily for 10 days 20 capsule 0    predniSONE (DELTASONE) 20 MG tablet Take 1 tablet by mouth 2 times daily for 5 days 10 tablet 0    vitamin D (ERGOCALCIFEROL) 1.25 MG (63317 UT) CAPS capsule TAKE 1 CAPSULE BY MOUTH ONE TIME PER WEEK 12 capsule 0    tiZANidine (ZANAFLEX) 4 MG tablet TAKE 1 TABLET BY MOUTH 3 TIMES DAILY AS NEEDED (MUSCLE PAIN) 60 tablet 1    ondansetron (ZOFRAN-ODT) 8 MG TBDP disintegrating tablet TAKE 1 TABLET BY MOUTH EVERY 8 HOURS AS NEEDED FOR NAUSEA AND VOMITING 9 tablet 2    butalbital-acetaminophen-caffeine (FIORICET, ESGIC) -40 MG per tablet TAKE 1 TABLET BY MOUTH EVERY 6 HOURS AS NEEDED FOR HEADACHES 60 tablet 0    nystatin (MYCOSTATIN) 183663 UNIT/GM powder Apply 3 times daily to abdominal area 60 g 1    esomeprazole (NEXIUM) 40 MG delayed release capsule Take 1 capsule by mouth 2 times daily 180 capsule 1    rosuvastatin (CRESTOR) 10 MG tablet TAKE 1 TABLET BY MOUTH EVERY DAY AT NIGHT 90 tablet 1    Simethicone (MYLANTA GAS PO) Take by mouth as needed (for acid reflux)      cyclobenzaprine (FLEXERIL) 10 MG tablet TAKE 1 TABLET BY MOUTH THREE TIMES A DAY AS NEEDED FOR MUSCLE SPASMS 90 tablet 0    busPIRone (BUSPAR) 10 MG tablet 1/2 - 1 tablet bid prn anxiety 180 tablet 1    escitalopram (LEXAPRO) 10 MG tablet 1 TABLET DAILY FOR ANXIETY. NEVER ABRUPTLY STOP 90 tablet 3    albuterol sulfate HFA (PROVENTIL HFA) 108 (90 Base) MCG/ACT inhaler Inhale 2 puffs into the lungs every 6 hours as needed for Wheezing 1 Inhaler 5    SYMBICORT 160-4.5 MCG/ACT AERO 2 puffs 2 times daily as needed       ipratropium-albuterol (DUONEB) 0.5-2.5 (3) MG/3ML SOLN nebulizer solution INHALE 3MLS VIA NEBULIZER 4 TIMES DAILY AS NEEDED      Respiratory Therapy Supplies DANA Renew cpap and supplies for continuation of HS use. 1 Device 0    CPAP Machine MISC by Does not apply route 1 each 0    ibuprofen (ADVIL;MOTRIN) 400 MG tablet Take 1 tablet by mouth 3 times daily (with meals) (Patient taking differently: Take 200 mg by mouth every 8 hours as needed for Pain or Fever ) 120 tablet 3     Current Facility-Administered Medications   Medication Dose Route Frequency Provider Last Rate Last Admin    triamcinolone acetonide (KENALOG-40) injection 40 mg  40 mg Intramuscular Once Keily Larkin MD        dexamethasone (DECADRON) injection 4 mg  4 mg Intramuscular Once Keily Larkin MD        cefTRIAXone (ROCEPHIN) 1,000 mg in lidocaine 1 % 2.86 mL IM Injection  1,000 mg Intramuscular Once Keily Larkin MD          Allergies   Allergen Reactions    Morphine Hives     IV Morphine    Penicillins Rash     As a child       Review of Systems   Constitutional: Negative. HENT: Negative. Eyes: Negative. Respiratory: Positive for cough. Cardiovascular: Negative. Gastrointestinal: Negative. Endocrine: Negative. Genitourinary: Negative. Musculoskeletal: Negative. Skin: Negative. Allergic/Immunologic: Negative.     Neurological: Negative. Hematological: Negative. Psychiatric/Behavioral: Negative. OBJECTIVE:    Physical Exam  Constitutional:       Appearance: Normal appearance. She is well-developed and well-groomed. HENT:      Head: Normocephalic and atraumatic. Right Ear: Tympanic membrane, ear canal and external ear normal. There is no impacted cerumen. Left Ear: Tympanic membrane, ear canal and external ear normal. There is no impacted cerumen. Nose: Nose normal.      Mouth/Throat:      Lips: Pink. Mouth: Mucous membranes are moist.      Dentition: Normal dentition. Pharynx: Oropharynx is clear. Uvula midline. Eyes:      General: Lids are normal.         Right eye: No discharge. Left eye: No discharge. Extraocular Movements: Extraocular movements intact. Conjunctiva/sclera: Conjunctivae normal.      Right eye: Right conjunctiva is not injected. Left eye: Left conjunctiva is not injected. Pupils: Pupils are equal, round, and reactive to light. Neck:      Thyroid: No thyromegaly. Vascular: No carotid bruit or JVD. Cardiovascular:      Rate and Rhythm: Normal rate and regular rhythm. Pulses: Normal pulses. Carotid pulses are 2+ on the right side and 2+ on the left side. Radial pulses are 2+ on the right side and 2+ on the left side. Heart sounds: Normal heart sounds, S1 normal and S2 normal. No murmur heard. Pulmonary:      Effort: Pulmonary effort is normal.      Breath sounds: Wheezing present. Abdominal:      General: Bowel sounds are normal. There is no distension or abdominal bruit. Palpations: Abdomen is soft. There is no mass. Hernia: No hernia is present. Musculoskeletal:         General: Normal range of motion. Cervical back: Full passive range of motion without pain, normal range of motion and neck supple. Right lower leg: No edema. Left lower leg: No edema.    Lymphadenopathy:      Cervical: No cervical adenopathy. Right cervical: No superficial cervical adenopathy. Left cervical: No superficial cervical adenopathy. Upper Body:      Right upper body: No supraclavicular adenopathy. Left upper body: No supraclavicular adenopathy. Skin:     General: Skin is warm and dry. Coloration: Skin is not pale. Findings: No lesion or rash. Nails: There is no clubbing. Neurological:      Mental Status: She is alert and oriented to person, place, and time. Motor: No weakness or tremor. Coordination: Coordination normal.      Deep Tendon Reflexes: Reflexes are normal and symmetric. Psychiatric:         Attention and Perception: Attention normal.         Mood and Affect: Mood normal.         Speech: Speech normal.         Behavior: Behavior normal. Behavior is cooperative. Thought Content: Thought content normal.         Cognition and Memory: Cognition and memory normal.         Judgment: Judgment normal.        BP (!) 144/86 (Site: Right Upper Arm, Position: Sitting, Cuff Size: Medium Adult)   Pulse 78   Temp 98.2 °F (36.8 °C) (Temporal)   Wt 231 lb (104.8 kg)   SpO2 98%   BMI 38.44 kg/m²      ASSESSMENT:     Diagnosis Orders   1. Bronchitisacute XR CHEST STANDARD (2 VW)    cefdinir (OMNICEF) 300 MG capsule    predniSONE (DELTASONE) 20 MG tablet    MA AEROSOL INHALATION TREATMENT    triamcinolone acetonide (KENALOG-40) injection 40 mg    dexamethasone (DECADRON) injection 4 mg    cefTRIAXone (ROCEPHIN) 1,000 mg in lidocaine 1 % 2.86 mL IM Injection        PLAN:    1.  Nebulizer treatment. Chest x-ray. Rocephin shot. Steroid shot.   Start prednisone and Omnicef tomorrow  Follow-up as needed

## 2021-07-07 ENCOUNTER — TELEPHONE (OUTPATIENT)
Dept: FAMILY MEDICINE CLINIC | Age: 56
End: 2021-07-07

## 2021-07-07 RX ORDER — PREDNISONE 20 MG/1
20 TABLET ORAL 2 TIMES DAILY
Qty: 8 TABLET | Refills: 0 | Status: SHIPPED | OUTPATIENT
Start: 2021-07-07 | End: 2021-07-11

## 2021-07-07 RX ORDER — CLARITHROMYCIN 500 MG/1
500 TABLET, COATED ORAL 2 TIMES DAILY
Qty: 20 TABLET | Refills: 0 | Status: SHIPPED | OUTPATIENT
Start: 2021-07-07 | End: 2021-07-17

## 2021-07-07 NOTE — TELEPHONE ENCOUNTER
Patient is not feeling any better and still has a rattle in her chest. Wheezing is slightly better. Thinks she may need a different antibiotic. Please advise.

## 2021-07-07 NOTE — LETTER
[facility]  [address] [address]  Phone: [phone]  Fax: [phone]    Stella Mccartney MD        July 7, 2021     Patient: Steffanie Nunez   YOB: 1965   Date of Visit: 06/29/2021       To Whom it May Concern:    Steffanie Nunez was seen in my clinic on 06/29/2021. She may return to work on 07/07/2021. She needs to be excused for 07/06/2021. If you have any questions or concerns, please don't hesitate to call.     Sincerely,         Stella Mccartney MD

## 2021-08-09 DIAGNOSIS — G47.00 INSOMNIA, UNSPECIFIED TYPE: ICD-10-CM

## 2021-08-09 DIAGNOSIS — Z87.898 HISTORY OF HEADACHE: ICD-10-CM

## 2021-08-09 RX ORDER — TIZANIDINE 4 MG/1
4 TABLET ORAL 3 TIMES DAILY PRN
Qty: 60 TABLET | Refills: 1 | Status: SHIPPED | OUTPATIENT
Start: 2021-08-09 | End: 2021-10-13

## 2021-08-09 RX ORDER — ZOLPIDEM TARTRATE 10 MG/1
10 TABLET ORAL NIGHTLY PRN
Qty: 30 TABLET | Refills: 0 | Status: SHIPPED | OUTPATIENT
Start: 2021-08-09 | End: 2021-09-08

## 2021-08-09 RX ORDER — BUTALBITAL, ACETAMINOPHEN AND CAFFEINE 50; 325; 40 MG/1; MG/1; MG/1
1 TABLET ORAL EVERY 6 HOURS PRN
Qty: 30 TABLET | Refills: 0 | Status: SHIPPED | OUTPATIENT
Start: 2021-08-09 | End: 2021-10-20 | Stop reason: SDUPTHER

## 2021-08-14 ENCOUNTER — HOSPITAL ENCOUNTER (EMERGENCY)
Age: 56
Discharge: HOME | End: 2021-08-14
Attending: EMERGENCY MEDICINE
Payer: COMMERCIAL

## 2021-08-14 ENCOUNTER — APPOINTMENT (OUTPATIENT)
Dept: GENERAL RADIOLOGY | Age: 56
End: 2021-08-14
Payer: COMMERCIAL

## 2021-08-14 VITALS
SYSTOLIC BLOOD PRESSURE: 136 MMHG | HEART RATE: 70 BPM | BODY MASS INDEX: 38.27 KG/M2 | OXYGEN SATURATION: 99 % | TEMPERATURE: 97.7 F | RESPIRATION RATE: 18 BRPM | DIASTOLIC BLOOD PRESSURE: 88 MMHG | WEIGHT: 230 LBS

## 2021-08-14 DIAGNOSIS — R07.9 CHEST PAIN, UNSPECIFIED TYPE: ICD-10-CM

## 2021-08-14 DIAGNOSIS — R53.83 OTHER FATIGUE: ICD-10-CM

## 2021-08-14 DIAGNOSIS — R05.9 COUGH: ICD-10-CM

## 2021-08-14 DIAGNOSIS — R51.9 NONINTRACTABLE HEADACHE, UNSPECIFIED CHRONICITY PATTERN, UNSPECIFIED HEADACHE TYPE: Primary | ICD-10-CM

## 2021-08-14 LAB
ADENOVIRUS BY PCR: NOT DETECTED
ALBUMIN SERPL-MCNC: 4.6 G/DL (ref 3.5–5.2)
ALP BLD-CCNC: 94 U/L (ref 35–104)
ALT SERPL-CCNC: 26 U/L (ref 5–33)
ANION GAP SERPL CALCULATED.3IONS-SCNC: 8 MMOL/L (ref 7–19)
AST SERPL-CCNC: 20 U/L (ref 5–32)
BASOPHILS ABSOLUTE: 0.1 K/UL (ref 0–0.2)
BASOPHILS RELATIVE PERCENT: 1.1 % (ref 0–1)
BILIRUB SERPL-MCNC: <0.2 MG/DL (ref 0.2–1.2)
BILIRUBIN URINE: NEGATIVE
BLOOD, URINE: NEGATIVE
BORDETELLA PARAPERTUSSIS BY PCR: NOT DETECTED
BORDETELLA PERTUSSIS BY PCR: NOT DETECTED
BUN BLDV-MCNC: 14 MG/DL (ref 6–20)
CALCIUM SERPL-MCNC: 9.6 MG/DL (ref 8.6–10)
CHLAMYDOPHILIA PNEUMONIAE BY PCR: NOT DETECTED
CHLORIDE BLD-SCNC: 103 MMOL/L (ref 98–111)
CLARITY: CLEAR
CO2: 27 MMOL/L (ref 22–29)
COLOR: YELLOW
CORONAVIRUS 229E BY PCR: NOT DETECTED
CORONAVIRUS HKU1 BY PCR: NOT DETECTED
CORONAVIRUS NL63 BY PCR: NOT DETECTED
CORONAVIRUS OC43 BY PCR: NOT DETECTED
CREAT SERPL-MCNC: 0.9 MG/DL (ref 0.5–0.9)
EOSINOPHILS ABSOLUTE: 0.1 K/UL (ref 0–0.6)
EOSINOPHILS RELATIVE PERCENT: 1.3 % (ref 0–5)
GFR AFRICAN AMERICAN: >59
GFR NON-AFRICAN AMERICAN: >60
GLUCOSE BLD-MCNC: 86 MG/DL (ref 74–109)
GLUCOSE URINE: NEGATIVE MG/DL
HCT VFR BLD CALC: 40.4 % (ref 37–47)
HEMOGLOBIN: 12.8 G/DL (ref 12–16)
HUMAN METAPNEUMOVIRUS BY PCR: NOT DETECTED
HUMAN RHINOVIRUS/ENTEROVIRUS BY PCR: NOT DETECTED
IMMATURE GRANULOCYTES #: 0 K/UL
INFLUENZA A BY PCR: NOT DETECTED
INFLUENZA B BY PCR: NOT DETECTED
KETONES, URINE: NEGATIVE MG/DL
LEUKOCYTE ESTERASE, URINE: NEGATIVE
LYMPHOCYTES ABSOLUTE: 2.5 K/UL (ref 1.1–4.5)
LYMPHOCYTES RELATIVE PERCENT: 39 % (ref 20–40)
MCH RBC QN AUTO: 30.1 PG (ref 27–31)
MCHC RBC AUTO-ENTMCNC: 31.7 G/DL (ref 33–37)
MCV RBC AUTO: 95.1 FL (ref 81–99)
MONOCYTES ABSOLUTE: 0.5 K/UL (ref 0–0.9)
MONOCYTES RELATIVE PERCENT: 7.2 % (ref 0–10)
MYCOPLASMA PNEUMONIAE BY PCR: NOT DETECTED
NEUTROPHILS ABSOLUTE: 3.2 K/UL (ref 1.5–7.5)
NEUTROPHILS RELATIVE PERCENT: 51.1 % (ref 50–65)
NITRITE, URINE: NEGATIVE
PARAINFLUENZA VIRUS 1 BY PCR: NOT DETECTED
PARAINFLUENZA VIRUS 2 BY PCR: NOT DETECTED
PARAINFLUENZA VIRUS 3 BY PCR: NOT DETECTED
PARAINFLUENZA VIRUS 4 BY PCR: NOT DETECTED
PDW BLD-RTO: 14.2 % (ref 11.5–14.5)
PH UA: 7.5 (ref 5–8)
PLATELET # BLD: 257 K/UL (ref 130–400)
PMV BLD AUTO: 9.4 FL (ref 9.4–12.3)
POTASSIUM REFLEX MAGNESIUM: 3.9 MMOL/L (ref 3.5–5)
PRO-BNP: 34 PG/ML (ref 0–900)
PROTEIN UA: NEGATIVE MG/DL
RBC # BLD: 4.25 M/UL (ref 4.2–5.4)
RESPIRATORY SYNCYTIAL VIRUS BY PCR: NOT DETECTED
SARS-COV-2, PCR: NOT DETECTED
SODIUM BLD-SCNC: 138 MMOL/L (ref 136–145)
SPECIFIC GRAVITY UA: 1.02 (ref 1–1.03)
TOTAL PROTEIN: 7.6 G/DL (ref 6.6–8.7)
TROPONIN: <0.01 NG/ML (ref 0–0.03)
UROBILINOGEN, URINE: 0.2 E.U./DL
WBC # BLD: 6.3 K/UL (ref 4.8–10.8)

## 2021-08-14 PROCEDURE — 93005 ELECTROCARDIOGRAM TRACING: CPT | Performed by: EMERGENCY MEDICINE

## 2021-08-14 PROCEDURE — 99283 EMERGENCY DEPT VISIT LOW MDM: CPT

## 2021-08-14 PROCEDURE — 96374 THER/PROPH/DIAG INJ IV PUSH: CPT

## 2021-08-14 PROCEDURE — 36415 COLL VENOUS BLD VENIPUNCTURE: CPT

## 2021-08-14 PROCEDURE — 84484 ASSAY OF TROPONIN QUANT: CPT

## 2021-08-14 PROCEDURE — 71045 X-RAY EXAM CHEST 1 VIEW: CPT

## 2021-08-14 PROCEDURE — 85025 COMPLETE CBC W/AUTO DIFF WBC: CPT

## 2021-08-14 PROCEDURE — 96375 TX/PRO/DX INJ NEW DRUG ADDON: CPT

## 2021-08-14 PROCEDURE — 83880 ASSAY OF NATRIURETIC PEPTIDE: CPT

## 2021-08-14 PROCEDURE — 81003 URINALYSIS AUTO W/O SCOPE: CPT

## 2021-08-14 PROCEDURE — 0202U NFCT DS 22 TRGT SARS-COV-2: CPT

## 2021-08-14 PROCEDURE — 80053 COMPREHEN METABOLIC PANEL: CPT

## 2021-08-14 PROCEDURE — 6360000002 HC RX W HCPCS: Performed by: EMERGENCY MEDICINE

## 2021-08-14 RX ORDER — ONDANSETRON 2 MG/ML
4 INJECTION INTRAMUSCULAR; INTRAVENOUS ONCE
Status: COMPLETED | OUTPATIENT
Start: 2021-08-14 | End: 2021-08-14

## 2021-08-14 RX ORDER — KETOROLAC TROMETHAMINE 30 MG/ML
30 INJECTION, SOLUTION INTRAMUSCULAR; INTRAVENOUS ONCE
Status: COMPLETED | OUTPATIENT
Start: 2021-08-14 | End: 2021-08-14

## 2021-08-14 RX ADMIN — ONDANSETRON HYDROCHLORIDE 4 MG: 2 SOLUTION INTRAMUSCULAR; INTRAVENOUS at 15:57

## 2021-08-14 RX ADMIN — KETOROLAC TROMETHAMINE 30 MG: 30 INJECTION, SOLUTION INTRAMUSCULAR; INTRAVENOUS at 15:57

## 2021-08-14 ASSESSMENT — PAIN SCALES - GENERAL
PAINLEVEL_OUTOF10: 7
PAINLEVEL_OUTOF10: 7

## 2021-08-14 NOTE — ED PROVIDER NOTES
[facility]  eMERGENCY dEPARTMENT eNCOUnter      Pt Name: Nilsa Tapia  MRN: [id number]  [place] 1965  Date of evaluation: 8/14/2021  Provider: Geneva Jimenez MD    [address] [place] [address]       Chief Complaint   Patient presents with    Shortness of Breath    Concern For COVID-19         HISTORY OF PRESENT ILLNESS   (Location/Symptom, Timing/Onset,Context/Setting, Quality, Duration, Modifying Factors, Severity)  Note limiting factors. Nilsa Tapia is a 49 y.o. female who presents to the emergency department with multiple complaints. Patient said for about a week she has had mild cough and dyspnea and fatigue. Mild headache. Feeling a little lightheaded. No vision changes numbness or weakness. Has felt slightly short of breath and had some chest congestion. Cough has been nonproductive. No abdominal pain. Had some nausea vomiting a week ago which is since resolved. No further vomiting. No diarrhea. No urinary symptoms. No numbness. No weakness. Has been vaccinated against Covid, however, several coworkers recently tested positive for Covid and she is worried she might have Covid. Headache described as mild. No thunderclap onset. Not the worst headache of her life. No neck pain or stiffness. No recent tick bites or rashes. HPI    NursingNotes were reviewed. REVIEW OF SYSTEMS    (2-9 systems for level 4, 10 or more for level 5)     Review of Systems   Constitutional: Positive for fatigue. Negative for fever. Eyes: Negative for pain and visual disturbance. Respiratory: Positive for cough and shortness of breath. Cardiovascular: Negative for chest pain, palpitations and leg swelling. Gastrointestinal: Negative for abdominal pain, diarrhea and vomiting. Genitourinary: Negative for dysuria. Musculoskeletal: Negative for neck pain and neck stiffness. Skin: Negative for rash. Neurological: Positive for light-headedness and headaches.  Negative for syncope, weakness and numbness. All other systems reviewed and are negative. A complete review of systems was performed and is negative except as noted above in the HPI.        PAST MEDICAL HISTORY     Past Medical History:   Diagnosis Date    Pereyra esophagus     Chest pressure 10/22/2017    10/14/2014  SE Positive for clinical myocardial ischemia 10/14/2017  Cath  Moderate diffuse disease in mid and distal LAD, normal LVFX     GERD (gastroesophageal reflux disease)     Hypertension     Obesity     Sleep apnea     sleeps with c pap         SURGICAL HISTORY       Past Surgical History:   Procedure Laterality Date    CARDIAC CATHETERIZATION  10/14/13  JDT    EF 50%    CHOLECYSTECTOMY      COLONOSCOPY  01/12/2016    Dr Karina Mata- normal, suspect IB    COLONOSCOPY N/A 01/25/2021    Dr Tamika Ames-[facility]-5 yr recall    HYSTERECTOMY      TONSILLECTOMY      UPPER GASTROINTESTINAL ENDOSCOPY  01/12/2016    Dr Karen Templeton:  Barretts esophagus    UPPER GASTROINTESTINAL ENDOSCOPY N/A 01/25/2021    Dr Patricia Hidalgo, (+) Pereyra's, 3 yr recall         CURRENT MEDICATIONS       Discharge Medication List as of 8/14/2021  4:13 PM      CONTINUE these medications which have NOT CHANGED    Details   tiZANidine (ZANAFLEX) 4 MG tablet TAKE 1 TABLET BY MOUTH 3 TIMES DAILY AS NEEDED (MUSCLE PAIN), Disp-60 tablet, R-1Normal      zolpidem (AMBIEN) 10 MG tablet TAKE 1 TABLET BY MOUTH NIGHTLY AS NEEDED FOR SLEEP FOR UP TO 30 DAYS., Disp-30 tablet, R-0Normal      butalbital-acetaminophen-caffeine (FIORICET, ESGIC) -40 MG per tablet TAKE 1 TABLET BY MOUTH EVERY 6 HOURS AS NEEDED FOR HEADACHES, Disp-30 tablet, R-0Not to exceed 5 additional fills before 11/13/2021Normal      vitamin D (ERGOCALCIFEROL) 1.25 MG (17231 UT) CAPS capsule TAKE 1 CAPSULE BY MOUTH ONE TIME PER WEEK, Disp-12 capsule, R-0Normal      ondansetron (ZOFRAN-ODT) 8 MG TBDP disintegrating tablet TAKE 1 TABLET BY MOUTH EVERY 8 HOURS AS NEEDED FOR NAUSEA AND VOMITING, Disp-9 tablet, R-2Normal      nystatin (MYCOSTATIN) 495661 UNIT/GM powder Apply 3 times daily to abdominal area, Disp-60 g, R-1, Normal      esomeprazole (NEXIUM) 40 MG delayed release capsule Take 1 capsule by mouth 2 times daily, Disp-180 capsule, R-1Normal      rosuvastatin (CRESTOR) 10 MG tablet TAKE 1 TABLET BY MOUTH EVERY DAY AT NIGHT, Disp-90 tablet, R-1Normal      Simethicone (MYLANTA GAS PO) Take by mouth as needed (for acid reflux)Historical Med      cyclobenzaprine (FLEXERIL) 10 MG tablet TAKE 1 TABLET BY MOUTH THREE TIMES A DAY AS NEEDED FOR MUSCLE SPASMS, Disp-90 tablet, R-0Normal      busPIRone (BUSPAR) 10 MG tablet 1/2 - 1 tablet bid prn anxiety, Disp-180 tablet, R-1Normal      escitalopram (LEXAPRO) 10 MG tablet 1 TABLET DAILY FOR ANXIETY. NEVER ABRUPTLY STOP, Disp-90 tablet,R-3Normal      albuterol sulfate HFA (PROVENTIL HFA) 108 (90 Base) MCG/ACT inhaler Inhale 2 puffs into the lungs every 6 hours as needed for Wheezing, Disp-1 Inhaler,R-5Normal      SYMBICORT 160-4.5 MCG/ACT AERO 2 puffs 2 times daily as needed , DAWHistorical Med      ipratropium-albuterol (DUONEB) 0.5-2.5 (3) MG/3ML SOLN nebulizer solution INHALE 3MLS VIA NEBULIZER 4 TIMES DAILY AS NEEDEDHistorical Med      Respiratory Therapy Supplies DANA Disp-1 Device, R-0, NormalRenew cpap and supplies for continuation of HS use.       CPAP Machine MISC Starting 10/1/2016, Until Discontinued, Disp-1 each, R-0, Print             ALLERGIES     Morphine and Penicillins    FAMILY HISTORY       Family History   Problem Relation Age of Onset    Breast Cancer Paternal Aunt     Colon Polyps Mother     Lung Cancer Father     Esophageal Cancer Father     Liver Cancer Father     Liver Cancer Paternal Grandfather     Colon Cancer Neg Hx     Rectal Cancer Neg Hx     Liver Disease Neg Hx           SOCIAL HISTORY       Social History     Socioeconomic History    Marital status:      Spouse name: None    Number of children: None    Years of education: is clear. No oropharyngeal exudate or posterior oropharyngeal erythema. Eyes:      General: No scleral icterus. Extraocular Movements: Extraocular movements intact. Conjunctiva/sclera: Conjunctivae normal.      Pupils: Pupils are equal, round, and reactive to light. Neck:      Vascular: No JVD. Cardiovascular:      Rate and Rhythm: Normal rate and regular rhythm. Pulses: Normal pulses. Heart sounds: Normal heart sounds. No murmur heard. Pulmonary:      Effort: Pulmonary effort is normal. No respiratory distress. Breath sounds: Normal breath sounds. Abdominal:      General: There is no distension. Palpations: Abdomen is soft. Tenderness: There is no abdominal tenderness. There is no guarding or rebound. Musculoskeletal:         General: No tenderness. Normal range of motion. Cervical back: Normal range of motion and neck supple. No rigidity. Right lower leg: No edema. Left lower leg: No edema. Lymphadenopathy:      Cervical: No cervical adenopathy. Skin:     General: Skin is warm and dry. Capillary Refill: Capillary refill takes less than 2 seconds. Neurological:      General: No focal deficit present. Mental Status: She is alert and oriented to person, place, and time. GCS: GCS eye subscore is 4. GCS verbal subscore is 5. GCS motor subscore is 6. Cranial Nerves: Cranial nerves are intact. Sensory: Sensation is intact. Motor: Motor function is intact. Coordination: Coordination is intact. Gait: Gait is intact. Psychiatric:         Mood and Affect: Mood normal.         Behavior: Behavior normal.         DIAGNOSTIC RESULTS     EKG: All EKG's are interpreted by the Emergency Department Physician who either signs or Co-signs this chart in the absence of a cardiologist.    Normal sinus rhythm. Normal QT. Possible LVH. No signs of acute ischemia. Artifact.     RADIOLOGY:   Non-plain film images such as CT, Ultrasound and MRI are read by the radiologist.  Kristina images are visualized and preliminarily interpreted by the emergency physician with the below findings:    Interpretation per the Radiologist below, if available at the time of this note:    XR CHEST PORTABLE   Final Result   1. No acute radiographic cardiopulmonary process. Signed by Dr Isela Batista:  Labs Reviewed   CBC WITH AUTO DIFFERENTIAL - Abnormal; Notable for the following components:       Result Value    MCHC 31.7 (*)     Basophils % 1.1 (*)     All other components within normal limits   RESPIRATORY PANEL, MOLECULAR, WITH COVID-19   COMPREHENSIVE METABOLIC PANEL W/ REFLEX TO MG FOR LOW K   TROPONIN   BRAIN NATRIURETIC PEPTIDE   URINE RT REFLEX TO CULTURE       All other labs were within normal range or not returned as of this dictation. EMERGENCY DEPARTMENT COURSE and DIFFERENTIALDIAGNOSIS/MDM:   Vitals:    Vitals:    08/14/21 1239 08/14/21 1521 08/14/21 1617   BP: (!) 170/86 (!) 140/90 136/88   Pulse: 69 70 70   Resp: 18 19 18   Temp: 97.7 °F (36.5 °C)     SpO2: 98% 99% 99%   Weight: 230 lb (104.3 kg)         MDM  No unilateral leg swelling or pain. No history of DVT or PE. No hemoptysis. Not tachycardic. Not hypoxic. No pleuritic chest pain. Symptoms do not sound consistent with PE. See no indication for D-dimer or CTA at this time. Discussed patient's results with her. Discussed possibly doing further work-up including head CT and possible CTA of the chest to rule out PE but I think this is pretty unlikely. After discussing these options patient declines further work-up. Also complained of chest discomfort with her symptoms and told her we could admit for further cardiac evaluation although it sound like her symptoms are unlikely cardiac in nature but did offer her this option. Ultimately she declined admission and further work-up.   Wants to go home and follow-up with primary care with plan to return to the ER if symptoms change or worsen. I have discussed my findings, my impression, and my differential diagnosis to the patient. The patient understands the risks, benefits, and alternatives of an inpatient versus outpatient continued evaluation and treatment. The patient has capacity to make medical decisions. The patient declines hospital admission or further observation in the emergency department. The patient understands the importance of follow-up and agrees to return if the condition changes, worsens, or if the patient changes his/her mind about inpatient evaluation and care. CONSULTS:  None    PROCEDURES:  Unless otherwise notedbelow, none     Procedures    FINAL IMPRESSION     1. Nonintractable headache, unspecified chronicity pattern, unspecified headache type    2. Cough    3. Chest pain, unspecified type    4.  Other fatigue          DISPOSITION/PLAN   DISPOSITION        PATIENT REFERRED TO:  @FUP@    DISCHARGE MEDICATIONS:  Discharge Medication List as of 8/14/2021  4:13 PM             (Please note that portions of this note were completed with a voice recognition program.  Efforts were made to edit the dictations butoccasionally words are mis-transcribed.)    Courtney Lloyd MD (electronically signed)  AttendingEmergency Physician          Courtney Lloyd MD  08/16/21 0681

## 2021-08-16 LAB
EKG P AXIS: 22 DEGREES
EKG P-R INTERVAL: 144 MS
EKG Q-T INTERVAL: 416 MS
EKG QRS DURATION: 96 MS
EKG QTC CALCULATION (BAZETT): 413 MS
EKG T AXIS: 18 DEGREES

## 2021-08-16 PROCEDURE — 93010 ELECTROCARDIOGRAM REPORT: CPT | Performed by: INTERNAL MEDICINE

## 2021-08-16 ASSESSMENT — ENCOUNTER SYMPTOMS
EYE PAIN: 0
VOMITING: 0
COUGH: 1
DIARRHEA: 0
ABDOMINAL PAIN: 0
SHORTNESS OF BREATH: 1

## 2021-09-05 DIAGNOSIS — E55.9 VITAMIN D DEFICIENCY: ICD-10-CM

## 2021-09-08 RX ORDER — ERGOCALCIFEROL 1.25 MG/1
CAPSULE ORAL
Qty: 12 CAPSULE | Refills: 0 | Status: SHIPPED | OUTPATIENT
Start: 2021-09-08 | End: 2021-12-08

## 2021-09-11 DIAGNOSIS — E78.5 HYPERLIPIDEMIA, UNSPECIFIED HYPERLIPIDEMIA TYPE: ICD-10-CM

## 2021-09-13 RX ORDER — ROSUVASTATIN CALCIUM 10 MG/1
TABLET, COATED ORAL
Qty: 90 TABLET | Refills: 1 | Status: SHIPPED | OUTPATIENT
Start: 2021-09-13 | End: 2021-10-20 | Stop reason: SINTOL

## 2021-09-17 DIAGNOSIS — K22.70 BARRETT'S ESOPHAGUS DETERMINED BY ENDOSCOPY: ICD-10-CM

## 2021-09-17 DIAGNOSIS — R12 CHRONIC HEARTBURN: ICD-10-CM

## 2021-09-17 RX ORDER — ESOMEPRAZOLE MAGNESIUM 40 MG/1
40 CAPSULE, DELAYED RELEASE ORAL 2 TIMES DAILY
Qty: 180 CAPSULE | Refills: 1 | Status: SHIPPED | OUTPATIENT
Start: 2021-09-17 | End: 2022-03-10

## 2021-10-13 RX ORDER — TIZANIDINE 4 MG/1
4 TABLET ORAL 3 TIMES DAILY PRN
Qty: 60 TABLET | Refills: 1 | Status: SHIPPED | OUTPATIENT
Start: 2021-10-13 | End: 2022-10-27 | Stop reason: SDUPTHER

## 2021-10-20 ENCOUNTER — HOSPITAL ENCOUNTER (OUTPATIENT)
Dept: GENERAL RADIOLOGY | Age: 56
Discharge: HOME | End: 2021-10-20
Payer: COMMERCIAL

## 2021-10-20 ENCOUNTER — OFFICE VISIT (OUTPATIENT)
Dept: FAMILY MEDICINE CLINIC | Age: 56
End: 2021-10-20
Payer: COMMERCIAL

## 2021-10-20 VITALS
RESPIRATION RATE: 20 BRPM | WEIGHT: 236 LBS | BODY MASS INDEX: 39.32 KG/M2 | TEMPERATURE: 97.2 F | SYSTOLIC BLOOD PRESSURE: 122 MMHG | DIASTOLIC BLOOD PRESSURE: 82 MMHG | OXYGEN SATURATION: 98 % | HEART RATE: 116 BPM | HEIGHT: 65 IN

## 2021-10-20 DIAGNOSIS — M54.50 ACUTE LEFT-SIDED LOW BACK PAIN WITHOUT SCIATICA: ICD-10-CM

## 2021-10-20 DIAGNOSIS — R06.09 DYSPNEA ON EXERTION: Primary | ICD-10-CM

## 2021-10-20 DIAGNOSIS — R63.5 WEIGHT GAIN: ICD-10-CM

## 2021-10-20 DIAGNOSIS — R23.2 HOT FLASHES: ICD-10-CM

## 2021-10-20 DIAGNOSIS — E55.9 VITAMIN D DEFICIENCY: ICD-10-CM

## 2021-10-20 DIAGNOSIS — Z12.31 SCREENING MAMMOGRAM, ENCOUNTER FOR: ICD-10-CM

## 2021-10-20 DIAGNOSIS — F32.A ANXIETY AND DEPRESSION: ICD-10-CM

## 2021-10-20 DIAGNOSIS — R03.0 ELEVATED BLOOD PRESSURE READING: ICD-10-CM

## 2021-10-20 DIAGNOSIS — R53.83 FATIGUE, UNSPECIFIED TYPE: ICD-10-CM

## 2021-10-20 DIAGNOSIS — F41.9 ANXIETY AND DEPRESSION: ICD-10-CM

## 2021-10-20 DIAGNOSIS — Z79.899 MEDICATION MANAGEMENT: ICD-10-CM

## 2021-10-20 DIAGNOSIS — Z87.898 HISTORY OF HEADACHE: ICD-10-CM

## 2021-10-20 DIAGNOSIS — Z80.1 FAMILY HISTORY OF LUNG CANCER: ICD-10-CM

## 2021-10-20 DIAGNOSIS — R06.2 WHEEZE: ICD-10-CM

## 2021-10-20 DIAGNOSIS — E78.5 HYPERLIPIDEMIA, UNSPECIFIED HYPERLIPIDEMIA TYPE: ICD-10-CM

## 2021-10-20 LAB
ALCOHOL URINE: ABNORMAL
AMPHETAMINE SCREEN, URINE: NEGATIVE
BARBITURATE SCREEN, URINE: POSITIVE
BENZODIAZEPINE SCREEN, URINE: NEGATIVE
BUPRENORPHINE URINE: NEGATIVE
COCAINE METABOLITE SCREEN URINE: NEGATIVE
FENTANYL SCREEN, URINE: ABNORMAL
GABAPENTIN SCREEN, URINE: ABNORMAL
MDMA URINE: NEGATIVE
METHADONE SCREEN, URINE: NEGATIVE
METHAMPHETAMINE, URINE: NEGATIVE
OPIATE SCREEN URINE: NEGATIVE
OXYCODONE SCREEN URINE: NEGATIVE
PHENCYCLIDINE SCREEN URINE: NEGATIVE
PROPOXYPHENE SCREEN, URINE: ABNORMAL
SYNTHETIC CANNABINOIDS(K2) SCREEN, URINE: ABNORMAL
THC SCREEN, URINE: NEGATIVE
TRAMADOL SCREEN URINE: ABNORMAL
TRICYCLIC ANTIDEPRESSANTS, UR: ABNORMAL

## 2021-10-20 PROCEDURE — 80305 DRUG TEST PRSMV DIR OPT OBS: CPT | Performed by: NURSE PRACTITIONER

## 2021-10-20 PROCEDURE — 72202 X-RAY EXAM SI JOINTS 3/> VWS: CPT

## 2021-10-20 PROCEDURE — 99214 OFFICE O/P EST MOD 30 MIN: CPT | Performed by: NURSE PRACTITIONER

## 2021-10-20 PROCEDURE — 72100 X-RAY EXAM L-S SPINE 2/3 VWS: CPT

## 2021-10-20 RX ORDER — ALBUTEROL SULFATE 90 UG/1
2 AEROSOL, METERED RESPIRATORY (INHALATION) EVERY 6 HOURS PRN
Qty: 1 EACH | Refills: 5 | Status: SHIPPED | OUTPATIENT
Start: 2021-10-20

## 2021-10-20 RX ORDER — MONTELUKAST SODIUM 10 MG/1
10 TABLET ORAL DAILY
Qty: 30 TABLET | Refills: 5 | Status: SHIPPED | OUTPATIENT
Start: 2021-10-20 | End: 2022-04-25

## 2021-10-20 RX ORDER — METHYLPREDNISOLONE 4 MG/1
TABLET ORAL
Qty: 1 KIT | Refills: 0 | Status: SHIPPED | OUTPATIENT
Start: 2021-10-20 | End: 2022-02-24

## 2021-10-20 RX ORDER — BUTALBITAL, ACETAMINOPHEN AND CAFFEINE 50; 325; 40 MG/1; MG/1; MG/1
1 TABLET ORAL EVERY 6 HOURS PRN
Qty: 60 TABLET | Refills: 2 | Status: SHIPPED | OUTPATIENT
Start: 2021-10-20 | End: 2022-04-05 | Stop reason: SDUPTHER

## 2021-10-20 SDOH — ECONOMIC STABILITY: FOOD INSECURITY: WITHIN THE PAST 12 MONTHS, YOU WORRIED THAT YOUR FOOD WOULD RUN OUT BEFORE YOU GOT MONEY TO BUY MORE.: NEVER TRUE

## 2021-10-20 SDOH — ECONOMIC STABILITY: FOOD INSECURITY: WITHIN THE PAST 12 MONTHS, THE FOOD YOU BOUGHT JUST DIDN'T LAST AND YOU DIDN'T HAVE MONEY TO GET MORE.: NEVER TRUE

## 2021-10-20 ASSESSMENT — PATIENT HEALTH QUESTIONNAIRE - PHQ9
SUM OF ALL RESPONSES TO PHQ QUESTIONS 1-9: 0
SUM OF ALL RESPONSES TO PHQ9 QUESTIONS 1 & 2: 0
SUM OF ALL RESPONSES TO PHQ QUESTIONS 1-9: 0
SUM OF ALL RESPONSES TO PHQ QUESTIONS 1-9: 0
2. FEELING DOWN, DEPRESSED OR HOPELESS: 0
1. LITTLE INTEREST OR PLEASURE IN DOING THINGS: 0

## 2021-10-20 ASSESSMENT — SOCIAL DETERMINANTS OF HEALTH (SDOH): HOW HARD IS IT FOR YOU TO PAY FOR THE VERY BASICS LIKE FOOD, HOUSING, MEDICAL CARE, AND HEATING?: NOT HARD AT ALL

## 2021-10-20 NOTE — PROGRESS NOTES
SUBJECTIVE:    Patient ID: Chastity Beltran is a52 y.o. female. Chastity Beltran is here today for Wheezing (Patient presents c/o wheezing and shortness of breath and patient was given Symbicort in the past and didn't really use it. She also had albuterol as a rescue inhaler. Patient states that she has recently been having \"attacks\"), Shortness of Breath, Lower Back Pain (patient is having mid lower back pain on both sides ), and Hypertension (patient states that blood pressure has been running high at home and isn't on blood pressure medication)  . HPI:   HPI     Increase in wheezing the last month. Pt states that when she carries in groceries she has increased wheezing. tx-albuterol rescue inhaler, helps. Has tried Symbicort and been evaluated by Dr. Samantha Sky in [place]. Pt's cost is too high for Symbicort. Left side low back pain  Burns when standing  Has to get off legs when flaring  tx-ice/heat/otc tx  Feels like a catch when standing at times. Is now at a sedentary job. Has noted an increase in blood pressure. Today's blood pressure is 132/102. There is no reported chest pains. Patient is not taking any daily blood pressure medication. Recheck bp is 122/82. She does have history of hyperlipidemia. She has previously been taking Crestor 10 mg for cholesterol. She also has history of vitamin D deficiency. She is currently taking vitamin D 50,000 individual units once weekly. Patient also reports intermittent hot flashes. Also, she reports having weight gain and is frustrated with this. She has had job changes and at a more sedentary job at this time. She states that when she gets home from work she really does not feel like getting out and doing anything she is so tired. She is interested in hormone levels. Does have sleep apnea and wears CPAP nightly. 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     Past Medical History:   Diagnosis Date    Anxiety and depression 11/2/2021    Pereyra esophagus     Chest pressure 10/22/2017    10/14/2014  SE Positive for clinical myocardial ischemia 10/14/2017  Cath  Moderate diffuse disease in mid and distal LAD, normal LVFX     GERD (gastroesophageal reflux disease)     Hyperlipidemia 11/2/2021    Hypertension     Obesity     Sleep apnea     sleeps with c pap     Prior to Visit Medications    Medication Sig Taking? Authorizing Provider   albuterol sulfate HFA (PROVENTIL HFA) 108 (90 Base) MCG/ACT inhaler Inhale 2 puffs into the lungs every 6 hours as needed for Wheezing Yes MICHELE Obando   montelukast (SINGULAIR) 10 MG tablet Take 1 tablet by mouth daily Yes MICHELE Obando   butalbital-acetaminophen-caffeine (FIORICET, ESGIC) -40 MG per tablet Take 1 tablet by mouth every 6 hours as needed for Headaches Yes MICHELE Obando   methylPREDNISolone (MEDROL DOSEPACK) 4 MG tablet Take by mouth. Yes MICHELE Obando   mometasone-formoterol (DULERA) 200-5 MCG/ACT inhaler Inhale 2 puffs into the lungs every 12 hours Yes MICHELE Obando   tiZANidine (ZANAFLEX) 4 MG tablet TAKE 1 TABLET BY MOUTH 3 TIMES DAILY AS NEEDED (MUSCLE PAIN) Yes MICHELE Obando   esomeprazole (NEXIUM) 40 MG delayed release capsule Take 1 capsule by mouth 2 times daily Yes Opal Hall MD   vitamin D (ERGOCALCIFEROL) 1.25 MG (26482 UT) CAPS capsule TAKE 1 CAPSULE BY MOUTH ONE TIME PER WEEK Yes MICHELE Obando   ondansetron (ZOFRAN-ODT) 8 MG TBDP disintegrating tablet TAKE 1 TABLET BY MOUTH EVERY 8 HOURS AS NEEDED FOR NAUSEA AND VOMITING Yes MICHELE Obando   nystatin (MYCOSTATIN) 724700 UNIT/GM powder Apply 3 times daily to abdominal area Yes Opal Hall MD   Simethicone (MYLANTA GAS PO) Take by mouth as needed (for acid reflux) Yes Historical Provider, MD   busPIRone (BUSPAR) 10 MG tablet 1/2 - 1 tablet bid prn anxiety Yes MICHELE Obando   escitalopram (LEXAPRO) 10 MG tablet 1 TABLET DAILY FOR ANXIETY.  NEVER ABRUPTLY STOP Yes Nahed Isela, APRN   ipratropium-albuterol (DUONEB) 0.5-2.5 (3) MG/3ML SOLN nebulizer solution INHALE 3MLS VIA NEBULIZER 4 TIMES DAILY AS NEEDED Yes Historical Provider, MD   Respiratory Therapy Supplies DANA Renew cpap and supplies for continuation of HS use. Yes Nahed Weiss APRN   CPAP Machine MISC by Does not apply route Yes Eulas Outlaw, APRN     Allergies   Allergen Reactions    Crestor [Rosuvastatin]      Leg cramps    Morphine Hives     IV Morphine    Penicillins Rash     As a child     Past Surgical History:   Procedure Laterality Date    CARDIAC CATHETERIZATION  10/14/13  JDT    EF 50%    CHOLECYSTECTOMY      COLONOSCOPY  01/12/2016    Dr Tod Shipman- normal, suspect IB    COLONOSCOPY N/A 01/25/2021    Dr Ann Ames-BCM-5 yr recall    HYSTERECTOMY      TONSILLECTOMY      UPPER GASTROINTESTINAL ENDOSCOPY  01/12/2016    Dr Morley [facility]:  Barretts esophagus    UPPER GASTROINTESTINAL ENDOSCOPY N/A 01/25/2021    Dr Ann Ames-Gastritis, (+) Pereyra's, 3 yr recall     Family History   Problem Relation Age of Onset    Breast Cancer Paternal Aunt     Colon Polyps Mother     Lung Cancer Father     Esophageal Cancer Father     Liver Cancer Father     Liver Cancer Paternal Grandfather     Colon Cancer Neg Hx     Rectal Cancer Neg Hx     Liver Disease Neg Hx      Social History     Socioeconomic History    Marital status:      Spouse name: Not on file    Number of children: Not on file    Years of education: Not on file    Highest education level: Not on file   Occupational History    Not on file   Tobacco Use    Smoking status: Never Smoker    Smokeless tobacco: Never Used   Vaping Use    Vaping Use: Never used   Substance and Sexual Activity    Alcohol use:  Yes     Alcohol/week: 0.0 standard drinks     Comment: occ    Drug use: No    Sexual activity: Not on file   Other Topics Concern    Not on file   Social History Narrative    Not on file     Social Determinants of Health     Financial regular rhythm. Heart sounds: Normal heart sounds. Pulmonary:      Effort: Pulmonary effort is normal.      Breath sounds: Normal breath sounds. Abdominal:      General: There is no distension. Palpations: Abdomen is soft. Musculoskeletal:      Cervical back: Normal range of motion and neck supple. No rigidity. Right lower leg: No edema. Left lower leg: No edema. Lymphadenopathy:      Cervical: No cervical adenopathy. Skin:     General: Skin is warm and dry. Capillary Refill: Capillary refill takes less than 2 seconds. Neurological:      General: No focal deficit present. Mental Status: She is alert and oriented to person, place, and time. Mental status is at baseline. Psychiatric:         Mood and Affect: Mood normal. Mood is not anxious or depressed. Affect is not angry. Speech: Speech normal.         Behavior: Behavior normal.         Thought Content: Thought content normal.         Judgment: Judgment normal.         /82   Pulse 116   Temp 97.2 °F (36.2 °C) (Temporal)   Resp 20   Ht 5' 5\" (1.651 m)   Wt 236 lb (107 kg)   SpO2 98%   BMI 39.27 kg/m²      ASSESSMENT:      ICD-10-CM    1. Dyspnea on exertion  R06.00 CT CHEST WO CONTRAST   2. Wheeze  R06.2 albuterol sulfate HFA (PROVENTIL HFA) 108 (90 Base) MCG/ACT inhaler     montelukast (SINGULAIR) 10 MG tablet     CT CHEST WO CONTRAST   3. Acute left-sided low back pain without sciatica  M54.50 XR LUMBAR SPINE (2-3 VIEWS)     XR SACROILIAC JOINTS (MIN 3 VIEWS)     methylPREDNISolone (MEDROL DOSEPACK) 4 MG tablet   4. History of headache  Z87.898 butalbital-acetaminophen-caffeine (FIORICET, ESGIC) -40 MG per tablet   5.  Hot flashes  R23.2 Cortisol AM, Total     DHEA-Sulfate     Estradiol     Estrone     Hemoglobin A1C     Insulin Free & Total     Progesterone     T3, Free     T3, Reverse     T4, Free     TSH without Reflex     Thyroid Peroxidase Antibody     Testosterone Free and Total, Non-Male 6. Screening mammogram, encounter for  Z12.31 Mammography screening bilateral   7. Family history of lung cancer  Z80.1 CT CHEST WO CONTRAST   8. Vitamin D deficiency  E55.9 Vitamin D 25 Hydroxy   9. Hyperlipidemia, unspecified hyperlipidemia type  E78.5 Comprehensive Metabolic Panel w/ Reflex to MG     Lipid Panel     CANCELED: Hemoglobin A1C     CANCELED: TSH WITH REFLEX TO FT4   10. Anxiety and depression  F41.9 CANCELED: TSH WITH REFLEX TO FT4    F32. A    11. Fatigue, unspecified type  R53.83 Vitamin B12     Ferritin     Iron and TIBC     Reticulocytes     Folate   12. Elevated blood pressure reading  R03.0 Urinalysis Reflex to Culture     CBC Auto Differential     Comprehensive Metabolic Panel w/ Reflex to MG   13. Medication management  Z79.899 POCT Rapid Drug Screen   14. Weight gain  R63.5 Cortisol AM, Total     DHEA-Sulfate     Estradiol     Estrone     Hemoglobin A1C     Insulin Free & Total     Progesterone     T3, Free     T3, Reverse     T4, Free     TSH without Reflex     Thyroid Peroxidase Antibody     Testosterone Free and Total, Non-Male       PLAN:    Justin Troy: Wheezing (Patient presents c/o wheezing and shortness of breath and patient was given Symbicort in the past and didn't really use it. She also had albuterol as a rescue inhaler. Patient states that she has recently been having \"attacks\"), Shortness of Breath, Lower Back Pain (patient is having mid lower back pain on both sides ), and Hypertension (patient states that blood pressure has been running high at home and isn't on blood pressure medication)  Recheck BP  Xrays today  [address] referral--copy lab for pt. and rechecking her blood pressure yes fine she does not need to sit for a minute afterwards  Lab entered. Sample dulera  Pt to consider eval with pulm at [facility]. Diagnosis and orders for this visit are above. Please note that this chart was generated using dragon dictationsoftware.   Although every effort was made

## 2021-10-21 DIAGNOSIS — I10 HYPERTENSION, UNSPECIFIED TYPE: ICD-10-CM

## 2021-10-21 DIAGNOSIS — R63.5 WEIGHT GAIN: ICD-10-CM

## 2021-10-21 DIAGNOSIS — E55.9 VITAMIN D DEFICIENCY: ICD-10-CM

## 2021-10-21 DIAGNOSIS — R23.2 HOT FLASHES: ICD-10-CM

## 2021-10-21 DIAGNOSIS — R53.83 FATIGUE, UNSPECIFIED TYPE: ICD-10-CM

## 2021-10-21 DIAGNOSIS — E78.5 HYPERLIPIDEMIA, UNSPECIFIED HYPERLIPIDEMIA TYPE: ICD-10-CM

## 2021-10-21 LAB
ALBUMIN SERPL-MCNC: 4.5 G/DL (ref 3.5–5.2)
ALP BLD-CCNC: 82 U/L (ref 35–104)
ALT SERPL-CCNC: 25 U/L (ref 5–33)
ANION GAP SERPL CALCULATED.3IONS-SCNC: 15 MMOL/L (ref 7–19)
AST SERPL-CCNC: 21 U/L (ref 5–32)
BACTERIA: NEGATIVE /HPF
BASOPHILS ABSOLUTE: 0 K/UL (ref 0–0.2)
BASOPHILS RELATIVE PERCENT: 0.5 % (ref 0–1)
BILIRUB SERPL-MCNC: <0.2 MG/DL (ref 0.2–1.2)
BILIRUBIN URINE: NEGATIVE
BLOOD, URINE: ABNORMAL
BUN BLDV-MCNC: 15 MG/DL (ref 6–20)
CALCIUM SERPL-MCNC: 9.8 MG/DL (ref 8.6–10)
CHLORIDE BLD-SCNC: 99 MMOL/L (ref 98–111)
CHOLESTEROL, TOTAL: 320 MG/DL (ref 160–199)
CLARITY: ABNORMAL
CO2: 22 MMOL/L (ref 22–29)
COLOR: YELLOW
CORTISOL - AM: 1.6 UG/DL (ref 6.2–19.4)
CREAT SERPL-MCNC: 0.8 MG/DL (ref 0.5–0.9)
CRYSTALS, UA: ABNORMAL /HPF
EOSINOPHILS ABSOLUTE: 0 K/UL (ref 0–0.6)
EOSINOPHILS RELATIVE PERCENT: 0 % (ref 0–5)
EPITHELIAL CELLS, UA: 16 /HPF (ref 0–5)
ESTRADIOL LEVEL: <5 PG/ML
FERRITIN: 41.5 NG/ML (ref 13–150)
FOLATE: 10.1 NG/ML (ref 4.8–37.3)
GFR AFRICAN AMERICAN: >59
GFR NON-AFRICAN AMERICAN: >60
GLUCOSE BLD-MCNC: 139 MG/DL (ref 74–109)
GLUCOSE URINE: NEGATIVE MG/DL
HBA1C MFR BLD: 5.5 % (ref 4–6)
HCT VFR BLD CALC: 42.8 % (ref 37–47)
HDLC SERPL-MCNC: 76 MG/DL (ref 65–121)
HEMOGLOBIN: 13.3 G/DL (ref 12–16)
HYALINE CASTS: 15 /HPF (ref 0–8)
IMMATURE GRANULOCYTES #: 0 K/UL
IRON SATURATION: 20 % (ref 14–50)
IRON: 74 UG/DL (ref 37–145)
KETONES, URINE: NEGATIVE MG/DL
LDL CHOLESTEROL CALCULATED: 222 MG/DL
LEUKOCYTE ESTERASE, URINE: NEGATIVE
LYMPHOCYTES ABSOLUTE: 1.2 K/UL (ref 1.1–4.5)
LYMPHOCYTES RELATIVE PERCENT: 19.7 % (ref 20–40)
MCH RBC QN AUTO: 30.3 PG (ref 27–31)
MCHC RBC AUTO-ENTMCNC: 31.1 G/DL (ref 33–37)
MCV RBC AUTO: 97.5 FL (ref 81–99)
MONOCYTES ABSOLUTE: 0.2 K/UL (ref 0–0.9)
MONOCYTES RELATIVE PERCENT: 2.9 % (ref 0–10)
NEUTROPHILS ABSOLUTE: 4.7 K/UL (ref 1.5–7.5)
NEUTROPHILS RELATIVE PERCENT: 76.7 % (ref 50–65)
NITRITE, URINE: NEGATIVE
PDW BLD-RTO: 13.6 % (ref 11.5–14.5)
PH UA: 5.5 (ref 5–8)
PLATELET # BLD: 328 K/UL (ref 130–400)
PMV BLD AUTO: 9.9 FL (ref 9.4–12.3)
POTASSIUM REFLEX MAGNESIUM: 4.1 MMOL/L (ref 3.5–5)
PROGESTERONE LEVEL: <0.05 NG/ML
PROTEIN UA: 30 MG/DL
RBC # BLD: 4.39 M/UL (ref 4.2–5.4)
RBC UA: 4 /HPF (ref 0–4)
RETICULOCYTE ABSOLUTE COUNT: 0.1 M/UL (ref 0.03–0.12)
RETICULOCYTE COUNT PCT: 2.19 % (ref 0.5–1.5)
SODIUM BLD-SCNC: 136 MMOL/L (ref 136–145)
SPECIFIC GRAVITY UA: 1.02 (ref 1–1.03)
T3 FREE: 2.6 PG/ML (ref 2–4.4)
T4 FREE: 0.96 NG/DL (ref 0.93–1.7)
TOTAL IRON BINDING CAPACITY: 374 UG/DL (ref 250–400)
TOTAL PROTEIN: 7.9 G/DL (ref 6.6–8.7)
TRIGL SERPL-MCNC: 111 MG/DL (ref 0–149)
TSH SERPL DL<=0.05 MIU/L-ACNC: 0.62 UIU/ML (ref 0.27–4.2)
UROBILINOGEN, URINE: 1 E.U./DL
VITAMIN B-12: 357 PG/ML (ref 211–946)
VITAMIN D 25-HYDROXY: 41.6 NG/ML
WBC # BLD: 6.2 K/UL (ref 4.8–10.8)
WBC UA: 2 /HPF (ref 0–5)

## 2021-10-23 LAB — THYROID PEROXIDASE (TPO) ABS: 0.5 IU/ML (ref 0–9)

## 2021-10-24 LAB — ESTRONE: 10.1 PG/ML

## 2021-10-25 LAB
INSULIN FREE: 98 UIU/ML (ref 3–25)
INSULIN: 135 UIU/ML (ref 3–25)

## 2021-10-27 LAB
SEX HORMONE BINDING GLOBULIN: 69 NMOL/L (ref 30–135)
T3 REVERSE: <2.5 NG/DL (ref 9–27)
TESTOSTERONE FREE: 0.8 PG/ML (ref 0.6–3.8)
TESTOSTERONE, FEMALES/CHILDREN: 8 NG/DL (ref 9–55)

## 2021-10-28 LAB — DHEAS (DHEA SULFATE): 6 UG/DL (ref 26–200)

## 2021-11-02 PROBLEM — E78.5 HYPERLIPIDEMIA: Status: ACTIVE | Noted: 2021-11-02

## 2021-11-02 PROBLEM — F41.9 ANXIETY AND DEPRESSION: Status: ACTIVE | Noted: 2021-11-02

## 2021-11-02 PROBLEM — Z80.1 FAMILY HISTORY OF LUNG CANCER: Status: ACTIVE | Noted: 2021-11-02

## 2021-11-02 PROBLEM — F32.A ANXIETY AND DEPRESSION: Status: ACTIVE | Noted: 2021-11-02

## 2021-11-02 PROBLEM — E55.9 VITAMIN D DEFICIENCY: Status: ACTIVE | Noted: 2021-11-02

## 2021-11-02 PROBLEM — Z87.898 HISTORY OF HEADACHE: Status: ACTIVE | Noted: 2021-11-02

## 2021-11-02 ASSESSMENT — ENCOUNTER SYMPTOMS
TROUBLE SWALLOWING: 0
BACK PAIN: 1
WHEEZING: 1
ABDOMINAL PAIN: 0
SHORTNESS OF BREATH: 1

## 2021-11-15 ENCOUNTER — HOSPITAL ENCOUNTER (OUTPATIENT)
Dept: CT IMAGING | Age: 56
Discharge: HOME | End: 2021-11-15
Payer: COMMERCIAL

## 2021-11-15 ENCOUNTER — OFFICE VISIT (OUTPATIENT)
Dept: FAMILY MEDICINE CLINIC | Age: 56
End: 2021-11-15
Payer: COMMERCIAL

## 2021-11-15 ENCOUNTER — HOSPITAL ENCOUNTER (OUTPATIENT)
Dept: WOMENS IMAGING | Age: 56
Discharge: HOME | End: 2021-11-15
Payer: COMMERCIAL

## 2021-11-15 VITALS
WEIGHT: 237 LBS | TEMPERATURE: 97.7 F | OXYGEN SATURATION: 98 % | HEIGHT: 65 IN | SYSTOLIC BLOOD PRESSURE: 112 MMHG | HEART RATE: 82 BPM | RESPIRATION RATE: 20 BRPM | BODY MASS INDEX: 39.49 KG/M2 | DIASTOLIC BLOOD PRESSURE: 62 MMHG

## 2021-11-15 DIAGNOSIS — Z12.31 SCREENING MAMMOGRAM, ENCOUNTER FOR: ICD-10-CM

## 2021-11-15 DIAGNOSIS — R06.2 WHEEZE: ICD-10-CM

## 2021-11-15 DIAGNOSIS — R06.09 DYSPNEA ON EXERTION: ICD-10-CM

## 2021-11-15 DIAGNOSIS — R31.9 HEMATURIA OF UNKNOWN CAUSE: Primary | ICD-10-CM

## 2021-11-15 DIAGNOSIS — R73.01 IFG (IMPAIRED FASTING GLUCOSE): ICD-10-CM

## 2021-11-15 DIAGNOSIS — E16.1 INAPPROPRIATELY HIGH SERUM INSULIN: ICD-10-CM

## 2021-11-15 DIAGNOSIS — Z12.31 BREAST CANCER SCREENING BY MAMMOGRAM: ICD-10-CM

## 2021-11-15 DIAGNOSIS — E78.5 HYPERLIPIDEMIA, UNSPECIFIED HYPERLIPIDEMIA TYPE: ICD-10-CM

## 2021-11-15 DIAGNOSIS — R79.89 LOW SERUM CORTISOL LEVEL: ICD-10-CM

## 2021-11-15 DIAGNOSIS — Z80.1 FAMILY HISTORY OF LUNG CANCER: ICD-10-CM

## 2021-11-15 PROCEDURE — 99214 OFFICE O/P EST MOD 30 MIN: CPT | Performed by: NURSE PRACTITIONER

## 2021-11-15 PROCEDURE — 71250 CT THORAX DX C-: CPT

## 2021-11-15 PROCEDURE — 77063 BREAST TOMOSYNTHESIS BI: CPT

## 2021-11-15 RX ORDER — ATORVASTATIN CALCIUM 20 MG/1
TABLET, FILM COATED ORAL
Qty: 90 TABLET | Refills: 1 | Status: SHIPPED | OUTPATIENT
Start: 2021-11-15

## 2021-11-15 NOTE — PROGRESS NOTES
SUBJECTIVE:    Patient ID: Maddie Bills is a52 y.o. female. Maddie Bills is here today for Results (Patient presents to discuss results of labs with [facility].)  . HPI:   HPI       Pt is here today to discuss most recent detailed lab report. She does state that since our treatment plan last visit, she feels she is breathing easier and no further SOA. Lab results are as follows:  No visits with results within 1 Day(s) from this visit. Latest known visit with results is:   Orders Only on 10/21/2021   Component Date Value Ref Range Status    Testosterone, Females/Children 10/21/2021 8* 9 - 55 ng/dL Final    Comment: Total Testosterone, Females 18 years and older   Premenopausal  9-55 ng/dL   Postmenopausal 5-32 ng/dL  REFERENCE INTERVAL: Testosterone, LC-MS/MS  Access complete set of age- and/or gender-specific reference intervals  for  this test in the [facility] [employer] [facility] Test Directory ([employer]). This test was developed and its performance characteristics determined  by  [employer]. It has not been cleared or approved by the [employer]  and  Drug Administration. This test was performed in a CLIA certified  laboratory  and is intended for clinical purposes.  Testosterone, Free 10/21/2021 0.8  0.6 - 3.8 pg/mL Final    Comment: To convert to pmol/L, multiply pg/mL by 3.47  The concentration of Free Testosterone is derived from a mathematical  expression based on the constant for the binding of testosterone to sex  hormone binding globulin. REFERENCE INTERVAL: Testosterone, Free LC-MS/MS  Access complete set of age- and/or gender-specific reference intervals  for  this test in the [employer] Test Directory ([employer]acketr). This test was developed and its performance characteristics determined  by  [employer]. It has not been cleared or approved by the [employer]  and  Drug Administration.  This test was performed in a CLIA certified  laboratory  and is intended for clinical purposes. Performed By: [facility] [address] [place] [address]  : Solange Casillas. Anamaria Marie MD      Sex Hormone Binding 10/21/2021 69  30 - 135 nmol/L Final    Comment: REFERENCE INTERVAL: Sex Hormone Binding Globulin  Access complete set of age- and/or gender-specific reference intervals  for  this test in the [facility] Test Directory ([facility]).  THYROID PEROXIDASE (TPO) ABS 10/21/2021 0.5  0.0 - 9.0 IU/mL Final    Comment: Performed By: [facility] [address] [place] [address]  : Solange Casillas. Anamaria Marie MD      TSH 10/21/2021 0.622  0.270 - 4.200 uIU/mL Final    T4 Free 10/21/2021 0.96  0.93 - 1.70 ng/dL Final    T3, Reverse 10/21/2021 <2.5* 9.0 - 27.0 ng/dL Final    Comment: INTERPRETIVE INFORMATION: Triiodothyronine, Reverse - LC-MS/MS  This test was developed and its performance characteristics determined  by  [facility]. It has not been cleared or approved by the [employer]  and  Drug Administration. This test was performed in a CLIA certified  laboratory  and is intended for clinical purposes. Performed By: [facility] [address] [place] [address]  : Solange Casillas. Anamaria Marie MD      T3, Free 10/21/2021 2.6  2.0 - 4.4 pg/mL Final    Progesterone 10/21/2021 <0.05  ng/mL Final    Comment: Women  Follicular phase Range 0.2 - 1.5  Ovulation phase  Range 0.8 - 3.0  Luteal phase     Range 1.7 - 27  Postmenopause    Range 0.1 - 0.8    Men              Range 0.2 - 1.4      Insulin, Free 10/21/2021 98* 3 - 25 uIU/mL Final    Insulin 10/21/2021 135* 3 - 25 uIU/mL Final    Comment: INTERPRETIVE INFORMATION: Insulin, Free and Total  This test reacts on a nearly equimolar basis with the analogs insulin  aspart,  insulin glargine, and insulin lispro. Insulin detemir exhibits  approximately  50 percent cross-reactivity. Test reactivity with insulin glulisine is  negligible (<3 percent).  To convert to pmol/L, multiply uIU/mL by 6.0. Reference intervals established for fasting specimens. Performed By: [facility]  [address]  [place], [address] [place] [address]  : Sean Blanca. Kandi Duncan MD      Hemoglobin A1C 10/21/2021 5.5  4.0 - 6.0 % Final    Comment: HbA1c levels >6% are an indication of hyperglycemia during the preceding 2  to 3 months or longer. HbA1c levels may reach 20% or higher in poorly controlled diabetes. Therapeutic action is suggested at levels above 8%. Diabetes patients with HbA1c levels below 7% meet the goal of the American  Diabetes Association. HbA1c levels below the established reference range may indicate recent  episodes of hypoglycemia, the presence of Hb variants, or shortened lifetime  of erythrocytes.       Estrone 10/21/2021 10.1  pg/mL Final    Comment:  Females:  Pre-menopausal: Early follicular     <387.2 pg/mL  Pre-menopausal: Late follicular       008.5-815.7 pg/mL  Pre-menopausal: Luteal               <200.0 pg/mL  Post-menopausal                       3.0-32.0 pg/mL  REFERENCE INTERVAL: Estrone by [address] [place] [address] complete set of age- and/or gender-specific reference intervals  for  this test in the [address] [place] [address] Laboratory Test Directory ([employer]). This test was developed and its performance characteristics determined  by  [facility]. It has not been cleared or approved by the [employer]  and  Drug Administration. This test was performed in a CLIA certified  laboratory  and is intended for clinical purposes. Performed By: [facility]  [address]  [place], [address] [facility]  : Sean Blanca.  Kandi Duncan MD      Estradiol 10/21/2021 <5.0  pg/mL Final    Comment: Healthy Women  Follicular phase Range 628 - 315  Ovulation phase  Range 317 - 525  Luteal phase     Range 294 - 752  Postmenopausal   Range 51.6 - 314    Healthy Pregnant Women  1st trimester    Range 2695 - 4161  2nd trimester    Range 75[address] - [address]  3rd trimester    Range 52305 - >93682    Healthy Men      Range 52.9 - 63.3      DHEAS (DHEA Sulfate) 10/21/2021 6* 26 - 200 ug/dL Final    Comment: REFERENCE INTERVAL: DHEAS  Access complete set of age- and/or gender-specific reference intervals  for  this test in the [facility] Test Directory (aruplab[facility]com). Performed By: [facility] [address]  [place], [address] [facility]  : Daja Orantes. Kallie Meléndez MD      Cortisol - AM 10/21/2021 1.6* 6.2 - 19.4 ug/dL Final    Folate 10/21/2021 10.1  4.8 - 37.3 ng/mL Final    Retic Ct Pct 10/21/2021 2.19* 0.50 - 1.50 % Final    Retic Ct Abs 10/21/2021 0.0961  0.0250 - 0.1250 M/uL Final    Hematocrit 10/21/2021 42.8  37.0 - 47.0 % Final    Iron 10/21/2021 74  37 - 145 ug/dL Final    TIBC 10/21/2021 374  250 - 400 ug/dL Final    Iron Saturation 10/21/2021 20  14 - 50 % Final    Ferritin 10/21/2021 41.5  13.0 - 150.0 ng/mL Final    Vitamin B-12 10/21/2021 357  211 - 946 pg/mL Final    Vit D, 25-Hydroxy 10/21/2021 41.6  >=30 ng/mL Final    Comment: <=20 ng/mL. ........... [employer] Deficient  21-29 ng/mL. ......... [employer] Insufficient  >=30 ng/mL. ........ [employer] Sufficient      Cholesterol, Total 10/21/2021 320* 160 - 199 mg/dL Final    Comment: <160 MG/DL=OPTIMAL    160-199 MG/DL= DESIRABLE    200-239 MG/DL=BORDERLINE-INCREASED RISK OF ATHEROSCLEROTIC  CARDIOVASCULAR DISEASE    > OR = 240 MG/DL-ASSOCIATED WITH AN INCREASED RISK OF  ATHROSCLEROTIC CARDIOVASCULAR DISEASE      Triglycerides 10/21/2021 111  0 - 149 mg/dL Final    HDL 10/21/2021 76  65 - 121 mg/dL Final    Comment: VALUES>60 MG/DL ARE ASSOCIATED WITH A DECREASED RISK OF  ATHEROSCLEROTIC CARDIOVASCULAR DISEASE      LDL Calculated 10/21/2021 222  <100 mg/dL Final    Comment: <100 MG/DL=OPITIMAL    100-129 MG/DL=DESIRABLE    130-159 MG/DL BORDERLINE=INCREASED RISK OF ATHEROSCLEROTIC  CARDIOVASCULAR DISEASE    > OR = 160 MG/DL=ASSOCIATED WITH AN INCREASE RISK OF  ATHEROSCLEROTIC CARDIOVASCULAR DISEASE      Sodium 10/21/2021 136  136 - 145 mmol/L Final    Potassium reflex Magnesium 10/21/2021 4.1  3.5 - 5.0 mmol/L Final    Chloride 10/21/2021 99  98 - 111 mmol/L Final    CO2 10/21/2021 22  22 - 29 mmol/L Final    Anion Gap 10/21/2021 15  7 - 19 mmol/L Final    Glucose 10/21/2021 139* 74 - 109 mg/dL Final    BUN 10/21/2021 15  6 - 20 mg/dL Final    CREATININE 10/21/2021 0.8  0.5 - 0.9 mg/dL Final    GFR Non- 10/21/2021 >60  >60 Final    Comment: This calculation may be inaccurate for patients under the age of 25 years. For ages 25 and older, a GFR >60 mL/min/1.73m2 (not corrected for weight) is  valid for stable renal function.  GFR  10/21/2021 >59  >59 Final    Comment: Chronic Kidney Disease: less than 60 ml/min/1.73 sq.m. Kidney Failure: less than 15 ml/min/1.73 sq.m. Results valid for patients 18 years and older.       Calcium 10/21/2021 9.8  8.6 - 10.0 mg/dL Final    Total Protein 10/21/2021 7.9  6.6 - 8.7 g/dL Final    Albumin 10/21/2021 4.5  3.5 - 5.2 g/dL Final    Total Bilirubin 10/21/2021 <0.2  0.2 - 1.2 mg/dL Final    Alkaline Phosphatase 10/21/2021 82  35 - 104 U/L Final    ALT 10/21/2021 25  5 - 33 U/L Final    AST 10/21/2021 21  5 - 32 U/L Final    WBC 10/21/2021 6.2  4.8 - 10.8 K/uL Final    RBC 10/21/2021 4.39  4.20 - 5.40 M/uL Final    Hemoglobin 10/21/2021 13.3  12.0 - 16.0 g/dL Final    MCV 10/21/2021 97.5  81.0 - 99.0 fL Final    MCH 10/21/2021 30.3  27.0 - 31.0 pg Final    MCHC 10/21/2021 31.1* 33.0 - 37.0 g/dL Final    RDW 10/21/2021 13.6  11.5 - 14.5 % Final    Platelets 40/16/0396 328  130 - 400 K/uL Final    MPV 10/21/2021 9.9  9.4 - 12.3 fL Final    Neutrophils % 10/21/2021 76.7* 50.0 - 65.0 % Final    Lymphocytes % 10/21/2021 19.7* 20.0 - 40.0 % Final    Monocytes % 10/21/2021 2.9  0.0 - 10.0 % Final    Eosinophils % 10/21/2021 0.0  0.0 - 5.0 % Final    Basophils % 10/21/2021 0.5  0.0 - 1.0 % Final    Neutrophils Absolute 10/21/2021 4.7  1.5 - 7.5 K/uL Final    Immature Granulocytes # 10/21/2021 0.0  K/uL Final    Lymphocytes Absolute 10/21/2021 1.2  1.1 - 4.5 K/uL Final    Monocytes Absolute 10/21/2021 0.20  0.00 - 0.90 K/uL Final    Eosinophils Absolute 10/21/2021 0.00  0.00 - 0.60 K/uL Final    Basophils Absolute 10/21/2021 0.00  0.00 - 0.20 K/uL Final    Color, UA 10/21/2021 YELLOW  Straw/Yellow Final    Clarity, UA 10/21/2021 TURBID* Clear Final    Glucose, Ur 10/21/2021 Negative  Negative mg/dL Final    Bilirubin Urine 10/21/2021 Negative  Negative Final    Ketones, Urine 10/21/2021 Negative  Negative mg/dL Final    Specific Gravity, UA 10/21/2021 1.025  1.005 - 1.030 Final    Blood, Urine 10/21/2021 MODERATE* Negative Final    pH, UA 10/21/2021 5.5  5.0 - 8.0 Final    Protein, UA 10/21/2021 30* Negative mg/dL Final    Urobilinogen, Urine 10/21/2021 1.0  <2.0 E.U./dL Final    Nitrite, Urine 10/21/2021 Negative  Negative Final    Leukocyte Esterase, Urine 10/21/2021 Negative  Negative Final    Bacteria, UA 10/21/2021 NEGATIVE* None Seen /HPF Final    Crystals, UA 10/21/2021 NEG* None Seen /HPF Final    Hyaline Casts, UA 10/21/2021 15* 0 - 8 /HPF Final    WBC, UA 10/21/2021 2  0 - 5 /HPF Final    RBC, UA 10/21/2021 4  0 - 4 /HPF Final    Epithelial Cells, UA 10/21/2021 16  0 - 5 /HPF Final    Comment: Urinalysis microscopic performed using the  automated methodology (AUWI analyzer). Pt and I have spoken in detail of each lab. She will be seeking eval at [facility] related to hormone levels, dhea, and her symptoms of decreased/low libido, fatigue. We will be rechecking cortisol levels to verify low level and then further testing ahead.            Past Medical History:   Diagnosis Date    Anxiety and depression 11/2/2021    Pereyra esophagus     Chest pressure 10/22/2017    10/14/2014  SE Positive for clinical myocardial ischemia 10/14/2017  Cath  Moderate diffuse disease in mid and distal LAD, normal LVFX     GERD (gastroesophageal reflux disease)     Hyperlipidemia 11/2/2021    Hypertension     Obesity     Sleep apnea     sleeps with c pap     Prior to Visit Medications    Medication Sig Taking? Authorizing Provider   atorvastatin (LIPITOR) 20 MG tablet 1/2 po nightly for cholesterol x 1wk then increase to 1 po nightly Yes MICHELE Obando   metFORMIN (GLUCOPHAGE) 500 MG tablet Take 1 tablet by mouth 2 times daily (with meals) Yes MICHELE Obando   albuterol sulfate HFA (PROVENTIL HFA) 108 (90 Base) MCG/ACT inhaler Inhale 2 puffs into the lungs every 6 hours as needed for Wheezing Yes MICHELE Obando   montelukast (SINGULAIR) 10 MG tablet Take 1 tablet by mouth daily Yes MICHELE Obando   butalbital-acetaminophen-caffeine (FIORICET, ESGIC) -40 MG per tablet Take 1 tablet by mouth every 6 hours as needed for Headaches Yes MICHELE Obando   methylPREDNISolone (MEDROL DOSEPACK) 4 MG tablet Take by mouth. Yes MICHELE Obando   mometasone-formoterol (DULERA) 200-5 MCG/ACT inhaler Inhale 2 puffs into the lungs every 12 hours Yes MICHELE Obando   tiZANidine (ZANAFLEX) 4 MG tablet TAKE 1 TABLET BY MOUTH 3 TIMES DAILY AS NEEDED (MUSCLE PAIN) Yes MICHELE Obando   esomeprazole (NEXIUM) 40 MG delayed release capsule Take 1 capsule by mouth 2 times daily Yes Shanita Barrientos MD   vitamin D (ERGOCALCIFEROL) 1.25 MG (87931 UT) CAPS capsule TAKE 1 CAPSULE BY MOUTH ONE TIME PER WEEK Yes MICHELE Obando   ondansetron (ZOFRAN-ODT) 8 MG TBDP disintegrating tablet TAKE 1 TABLET BY MOUTH EVERY 8 HOURS AS NEEDED FOR NAUSEA AND VOMITING Yes MICHELE Obando   nystatin (MYCOSTATIN) 392314 UNIT/GM powder Apply 3 times daily to abdominal area Yes Shanita Barrientos MD   Simethicone (MYLANTA GAS PO) Take by mouth as needed (for acid reflux) Yes Historical Provider, MD   busPIRone (BUSPAR) 10 MG tablet 1/2 - 1 tablet bid prn anxiety Yes MICHELE Obando   escitalopram (LEXAPRO) 10 MG tablet 1 TABLET DAILY FOR ANXIETY.  NEVER ABRUPTLY STOP Yes Nahed Isela, APRN   ipratropium-albuterol (DUONEB) 0.5-2.5 (3) MG/3ML SOLN nebulizer solution INHALE 3MLS VIA NEBULIZER 4 TIMES DAILY AS NEEDED Yes Historical Provider, MD   Respiratory Therapy Supplies DANA Renew cpap and supplies for continuation of HS use. Yes Nahed Somerset, APRN   CPAP Machine MISC by Does not apply route Yes Billee Dothan, APRN     Allergies   Allergen Reactions    Crestor [Rosuvastatin]      Leg cramps    Morphine Hives     IV Morphine    Penicillins Rash     As a child     Past Surgical History:   Procedure Laterality Date    CARDIAC CATHETERIZATION  10/14/13  JDT    EF 50%    CHOLECYSTECTOMY      COLONOSCOPY  01/12/2016    Dr Denisa Palma- normal, suspect IB    COLONOSCOPY N/A 01/25/2021    Dr Raj Ames-BCM-5 yr recall    HYSTERECTOMY      OVARY REMOVAL      TONSILLECTOMY      UPPER GASTROINTESTINAL ENDOSCOPY  01/12/2016    Dr Demario Munguia:  Barretts esophagus    UPPER GASTROINTESTINAL ENDOSCOPY N/A 01/25/2021    Dr Raj Ames-Gastritis, (+) Pereyra's, 3 yr recall     Family History   Problem Relation Age of Onset    Colon Polyps Mother     Lung Cancer Father     Esophageal Cancer Father     Liver Cancer Father     Liver Cancer Paternal Grandfather     Ovarian Cancer Paternal Grandmother     Colon Cancer Neg Hx     Rectal Cancer Neg Hx     Liver Disease Neg Hx      Social History     Socioeconomic History    Marital status:      Spouse name: Not on file    Number of children: Not on file    Years of education: Not on file    Highest education level: Not on file   Occupational History    Not on file   Tobacco Use    Smoking status: Never Smoker    Smokeless tobacco: Never Used   Vaping Use    Vaping Use: Never used   Substance and Sexual Activity    Alcohol use:  Yes     Alcohol/week: 0.0 standard drinks     Comment: occ    Drug use: No    Sexual activity: Not on file   Other Topics Concern    Not on file   Social History Narrative    Not on file     Social Determinants of Health     Financial Resource Strain: Low Risk     Difficulty of Paying Living Expenses: Not hard at all   Food Insecurity: No Food Insecurity    Worried About Running Out of Food in the Last Year: Never true    Teresita of Food in the Last Year: Never true   Transportation Needs:     Lack of Transportation (Medical): Not on file    Lack of Transportation (Non-Medical): Not on file   Physical Activity:     Days of Exercise per Week: Not on file    Minutes of Exercise per Session: Not on file   Stress:     Feeling of Stress : Not on file   Social Connections:     Frequency of Communication with Friends and Family: Not on file    Frequency of Social Gatherings with Friends and Family: Not on file    Attends Spiritism Services: Not on file    Active Member of [address] [place] [address] [employer] or Organizations: Not on file    Attends Club or Organization Meetings: Not on file    Marital Status: Not on file   Intimate Partner Violence:     Fear of Current or Ex-Partner: Not on file    Emotionally Abused: Not on file    Physically Abused: Not on file    Sexually Abused: Not on file   Housing Stability:     Unable to Pay for Housing in the Last Year: Not on file    Number of [place] in the Last Year: Not on file    Unstable Housing in the Last Year: Not on file       Review of Systems   Constitutional: Positive for fatigue. Negative for unexpected weight change. HENT: Negative. Respiratory: Negative for shortness of breath. Cardiovascular: Negative. Neurological: Positive for headaches. Psychiatric/Behavioral: Positive for sleep disturbance. OBJECTIVE:    Physical Exam  Vitals and nursing note reviewed. Constitutional:       Appearance: She is well-developed. HENT:      Head: Normocephalic and atraumatic. Eyes:      Extraocular Movements: Extraocular movements intact. Conjunctiva/sclera: Conjunctivae normal.      Pupils: Pupils are equal, round, and reactive to light. Cardiovascular:      Rate and Rhythm: Normal rate and regular rhythm. Heart sounds: Normal heart sounds. Pulmonary:      Effort: Pulmonary effort is normal. No respiratory distress. Breath sounds: Normal breath sounds. Abdominal:      Palpations: Abdomen is soft. Musculoskeletal:      Cervical back: Neck supple. No rigidity. Skin:     General: Skin is warm and dry. Capillary Refill: Capillary refill takes less than 2 seconds. Neurological:      General: No focal deficit present. Mental Status: She is alert and oriented to person, place, and time. Mental status is at baseline. Psychiatric:         Mood and Affect: Mood normal.         Behavior: Behavior normal.         Thought Content: Thought content normal.         Judgment: Judgment normal.         /62 (Site: Right Upper Arm, Position: Sitting, Cuff Size: Large Adult)   Pulse 82   Temp 97.7 °F (36.5 °C) (Temporal)   Resp 20   Ht 5' 5\" (1.651 m)   Wt 237 lb (107.5 kg)   SpO2 98%   BMI 39.44 kg/m²      ASSESSMENT:      ICD-10-CM    1. Hematuria of unknown cause  R31.9 CT ABDOMEN PELVIS WO CONTRAST Additional Contrast? Radiologist Recommendation     Urinalysis Reflex to Culture   2. IFG (impaired fasting glucose)  R73.01 Hemoglobin A1C     metFORMIN (GLUCOPHAGE) 500 MG tablet     Insulin Free & Total   3. Low serum cortisol level (HCC)  E27.40 Cortisol AM, Total   4. Hyperlipidemia, unspecified hyperlipidemia type  E78.5 atorvastatin (LIPITOR) 20 MG tablet     Comprehensive Metabolic Panel     Lipid Panel   5. Inappropriately high serum insulin  E16.1 metFORMIN (GLUCOPHAGE) 500 MG tablet       PLAN:    Anne Pruitt: Results (Patient presents to discuss results of labs with [facility]  copy all lab for patient. Glucose monitor for occasional checking. Pt to research Ozempic and let me know if she wishes to attempt with insurance. Lab due fasting within 1wk then again early Feb  eliezer discussed r/t statin.   Total time 33mins  Diagnosis and orders for this visit are above. Please note that this chart was generated using dragon dictationsoftware. Although every effort was made to ensure the accuracy of this automated transcription, some errors in transcription may have occurred.

## 2021-11-19 DIAGNOSIS — R31.9 HEMATURIA OF UNKNOWN CAUSE: ICD-10-CM

## 2021-11-19 DIAGNOSIS — R79.89 LOW SERUM CORTISOL LEVEL: ICD-10-CM

## 2021-11-19 LAB
BACTERIA: NEGATIVE /HPF
BILIRUBIN URINE: NEGATIVE
BLOOD, URINE: ABNORMAL
CLARITY: CLEAR
COLOR: YELLOW
CORTISOL - AM: 15.9 UG/DL (ref 6.2–19.4)
CRYSTALS, UA: ABNORMAL /HPF
EPITHELIAL CELLS, UA: 1 /HPF (ref 0–5)
GLUCOSE URINE: NEGATIVE MG/DL
HYALINE CASTS: 0 /HPF (ref 0–8)
KETONES, URINE: NEGATIVE MG/DL
LEUKOCYTE ESTERASE, URINE: NEGATIVE
NITRITE, URINE: NEGATIVE
PH UA: 5 (ref 5–8)
PROTEIN UA: NEGATIVE MG/DL
RBC UA: 4 /HPF (ref 0–4)
SPECIFIC GRAVITY UA: 1.02 (ref 1–1.03)
UROBILINOGEN, URINE: 0.2 E.U./DL
WBC UA: 1 /HPF (ref 0–5)

## 2021-11-22 ENCOUNTER — HOSPITAL ENCOUNTER (OUTPATIENT)
Dept: GENERAL RADIOLOGY | Age: 56
Discharge: HOME | End: 2021-11-22
Payer: COMMERCIAL

## 2021-11-22 DIAGNOSIS — R31.9 HEMATURIA OF UNKNOWN CAUSE: ICD-10-CM

## 2021-11-22 PROCEDURE — 74176 CT ABD & PELVIS W/O CONTRAST: CPT

## 2021-11-22 ASSESSMENT — ENCOUNTER SYMPTOMS: SHORTNESS OF BREATH: 0

## 2021-11-23 DIAGNOSIS — R79.89 LOW SERUM CORTISOL LEVEL: Primary | ICD-10-CM

## 2021-11-29 ENCOUNTER — PATIENT MESSAGE (OUTPATIENT)
Dept: FAMILY MEDICINE CLINIC | Age: 56
End: 2021-11-29

## 2021-11-29 DIAGNOSIS — K86.89 FATTY PANCREAS: Primary | ICD-10-CM

## 2021-11-29 NOTE — TELEPHONE ENCOUNTER
From: Farida Rich  Sent: 11/29/2021 3:23 PM CST  [facility]  Subject: CT Abd/Pelvis    The back pain was awful this weekend. It still feels like a kidney stone pain. I would have to sit down and rest quite often!

## 2021-11-29 NOTE — TELEPHONE ENCOUNTER
Guzman Hanley,  I seen on my results where it showed a fatty pancreas. Since I have one more lab test that is ordered, I was wondering if you can add a  to the order, just to be on the safe side. I plan on coming on Tuesday or Wednesday to get the other lab test done.   Thanks!!

## 2021-12-01 DIAGNOSIS — K86.89 FATTY PANCREAS: ICD-10-CM

## 2021-12-01 DIAGNOSIS — R79.89 LOW SERUM CORTISOL LEVEL: ICD-10-CM

## 2021-12-01 LAB — CA 19-9: 4 U/ML (ref 0–35)

## 2021-12-03 LAB — ADRENOCORTICOTROPIC HORMONE: 45.8 PG/ML (ref 7.2–63.3)

## 2021-12-06 DIAGNOSIS — E55.9 VITAMIN D DEFICIENCY: ICD-10-CM

## 2021-12-08 RX ORDER — ERGOCALCIFEROL 1.25 MG/1
CAPSULE ORAL
Qty: 12 CAPSULE | Refills: 0 | Status: SHIPPED | OUTPATIENT
Start: 2021-12-08 | End: 2021-12-10 | Stop reason: SDUPTHER

## 2021-12-10 DIAGNOSIS — R73.01 IFG (IMPAIRED FASTING GLUCOSE): ICD-10-CM

## 2021-12-10 DIAGNOSIS — E88.81 INSULIN RESISTANCE: ICD-10-CM

## 2021-12-10 DIAGNOSIS — E55.9 VITAMIN D DEFICIENCY: ICD-10-CM

## 2021-12-10 RX ORDER — SEMAGLUTIDE 1.34 MG/ML
INJECTION, SOLUTION SUBCUTANEOUS
Qty: 1.5 ML | Refills: 1 | Status: SHIPPED | OUTPATIENT
Start: 2021-12-10 | End: 2022-02-11

## 2021-12-10 RX ORDER — ERGOCALCIFEROL 1.25 MG/1
CAPSULE ORAL
Qty: 12 CAPSULE | Refills: 0 | Status: SHIPPED | OUTPATIENT
Start: 2021-12-10

## 2021-12-16 ENCOUNTER — OFFICE VISIT (OUTPATIENT)
Dept: FAMILY MEDICINE CLINIC | Age: 56
End: 2021-12-16
Payer: COMMERCIAL

## 2021-12-16 VITALS
HEART RATE: 82 BPM | SYSTOLIC BLOOD PRESSURE: 130 MMHG | WEIGHT: 236.8 LBS | DIASTOLIC BLOOD PRESSURE: 86 MMHG | TEMPERATURE: 97.5 F | HEIGHT: 65 IN | OXYGEN SATURATION: 98 % | BODY MASS INDEX: 39.45 KG/M2

## 2021-12-16 DIAGNOSIS — J40 BRONCHITIS: Primary | ICD-10-CM

## 2021-12-16 DIAGNOSIS — R11.2 NAUSEA AND VOMITING, INTRACTABILITY OF VOMITING NOT SPECIFIED, UNSPECIFIED VOMITING TYPE: ICD-10-CM

## 2021-12-16 DIAGNOSIS — J40 BRONCHITIS: ICD-10-CM

## 2021-12-16 LAB — SARS-COV-2, PCR: NOT DETECTED

## 2021-12-16 PROCEDURE — 96372 THER/PROPH/DIAG INJ SC/IM: CPT | Performed by: FAMILY MEDICINE

## 2021-12-16 PROCEDURE — 99213 OFFICE O/P EST LOW 20 MIN: CPT | Performed by: FAMILY MEDICINE

## 2021-12-16 RX ORDER — ONDANSETRON HYDROCHLORIDE 8 MG/1
8 TABLET, FILM COATED ORAL EVERY 8 HOURS PRN
Qty: 30 TABLET | Refills: 0 | Status: SHIPPED | OUTPATIENT
Start: 2021-12-16 | End: 2022-02-24

## 2021-12-16 RX ORDER — TRIAMCINOLONE ACETONIDE 40 MG/ML
40 INJECTION, SUSPENSION INTRA-ARTICULAR; INTRAMUSCULAR ONCE
Status: COMPLETED | OUTPATIENT
Start: 2021-12-16 | End: 2021-12-16

## 2021-12-16 RX ORDER — CEFDINIR 300 MG/1
300 CAPSULE ORAL 2 TIMES DAILY
Qty: 20 CAPSULE | Refills: 0 | Status: SHIPPED | OUTPATIENT
Start: 2021-12-16 | End: 2021-12-26

## 2021-12-16 RX ORDER — DEXAMETHASONE SODIUM PHOSPHATE 10 MG/ML
4 INJECTION INTRAMUSCULAR; INTRAVENOUS ONCE
Status: COMPLETED | OUTPATIENT
Start: 2021-12-16 | End: 2021-12-16

## 2021-12-16 RX ADMIN — DEXAMETHASONE SODIUM PHOSPHATE 4 MG: 10 INJECTION INTRAMUSCULAR; INTRAVENOUS at 12:01

## 2021-12-16 RX ADMIN — TRIAMCINOLONE ACETONIDE 40 MG: 40 INJECTION, SUSPENSION INTRA-ARTICULAR; INTRAMUSCULAR at 12:02

## 2021-12-16 ASSESSMENT — ENCOUNTER SYMPTOMS
ALLERGIC/IMMUNOLOGIC NEGATIVE: 1
GASTROINTESTINAL NEGATIVE: 1
EYES NEGATIVE: 1
COUGH: 1

## 2021-12-16 NOTE — PROGRESS NOTES
After obtaining consent, and per orders of Dr. Rhianna Garber, injection of Kenalog and Rocephin given in Right and left hip by Gabrielle Benson MA. Patient instructed to remain in clinic for 20 minutes afterwards, and to report any adverse reaction to me immediately.

## 2021-12-16 NOTE — PROGRESS NOTES
SUBJECTIVE:    Patient ID: Tarsha Hutchinson is a 49 y. o.female. HPI:   Patient here for evaluation of an upper respiratory infection. Patient is a 62-year-old white female. She complains about 3 to 4-day history of sinus pressure and congestion that progressively got worse and nodules on her chest.  She throw up this morning. Denies any fevers or chills no loss of smell or taste. She is fully vaccinated.        Past Medical History:   Diagnosis Date    Anxiety and depression 11/2/2021    Pereyra esophagus     Chest pressure 10/22/2017    10/14/2014  SE Positive for clinical myocardial ischemia 10/14/2017  Cath  Moderate diffuse disease in mid and distal LAD, normal LVFX     GERD (gastroesophageal reflux disease)     Hyperlipidemia 11/2/2021    Hypertension     Obesity     Sleep apnea     sleeps with c pap      Current Outpatient Medications   Medication Sig Dispense Refill    cefdinir (OMNICEF) 300 MG capsule Take 1 capsule by mouth 2 times daily for 10 days 20 capsule 0    ondansetron (ZOFRAN) 8 MG tablet Take 1 tablet by mouth every 8 hours as needed for Nausea or Vomiting 30 tablet 0    Semaglutide,0.25 or 0.5MG/DOS, (OZEMPIC, 0.25 OR 0.5 MG/DOSE,) 2 MG/1.5ML SOPN 0.25mg sub-Q once weekly 1.5 mL 1    vitamin D (ERGOCALCIFEROL) 1.25 MG (29225 UT) CAPS capsule TAKE 1 CAPSULE BY MOUTH ONE TIME PER WEEK 12 capsule 0    atorvastatin (LIPITOR) 20 MG tablet 1/2 po nightly for cholesterol x 1wk then increase to 1 po nightly 90 tablet 1    metFORMIN (GLUCOPHAGE) 500 MG tablet Take 1 tablet by mouth 2 times daily (with meals) 180 tablet 1    albuterol sulfate HFA (PROVENTIL HFA) 108 (90 Base) MCG/ACT inhaler Inhale 2 puffs into the lungs every 6 hours as needed for Wheezing 1 each 5    montelukast (SINGULAIR) 10 MG tablet Take 1 tablet by mouth daily 30 tablet 5    butalbital-acetaminophen-caffeine (FIORICET, ESGIC) -40 MG per tablet Take 1 tablet by mouth every 6 hours as needed for Headaches 60 tablet 2    mometasone-formoterol (DULERA) 200-5 MCG/ACT inhaler Inhale 2 puffs into the lungs every 12 hours 1 each 2    tiZANidine (ZANAFLEX) 4 MG tablet TAKE 1 TABLET BY MOUTH 3 TIMES DAILY AS NEEDED (MUSCLE PAIN) 60 tablet 1    esomeprazole (NEXIUM) 40 MG delayed release capsule Take 1 capsule by mouth 2 times daily 180 capsule 1    ondansetron (ZOFRAN-ODT) 8 MG TBDP disintegrating tablet TAKE 1 TABLET BY MOUTH EVERY 8 HOURS AS NEEDED FOR NAUSEA AND VOMITING 9 tablet 2    nystatin (MYCOSTATIN) 932130 UNIT/GM powder Apply 3 times daily to abdominal area 60 g 1    Simethicone (MYLANTA GAS PO) Take by mouth as needed (for acid reflux)      busPIRone (BUSPAR) 10 MG tablet 1/2 - 1 tablet bid prn anxiety 180 tablet 1    escitalopram (LEXAPRO) 10 MG tablet 1 TABLET DAILY FOR ANXIETY. NEVER ABRUPTLY STOP 90 tablet 3    ipratropium-albuterol (DUONEB) 0.5-2.5 (3) MG/3ML SOLN nebulizer solution INHALE 3MLS VIA NEBULIZER 4 TIMES DAILY AS NEEDED      Respiratory Therapy Supplies DANA Renew cpap and supplies for continuation of HS use. 1 Device 0    CPAP Machine MISC by Does not apply route 1 each 0    methylPREDNISolone (MEDROL DOSEPACK) 4 MG tablet Take by mouth. (Patient not taking: Reported on 12/16/2021) 1 kit 0     Current Facility-Administered Medications   Medication Dose Route Frequency Provider Last Rate Last Admin    cefTRIAXone (ROCEPHIN) 1,000 mg in lidocaine 1 % 2.86 mL IM Injection  1,000 mg IntraMUSCular Once Andrew West MD        triamcinolone acetonide VIA [facility]) injection 40 mg  40 mg IntraMUSCular Once Andrew West MD        dexamethasone (DECADRON) injection 4 mg  4 mg IntraMUSCular Once Andrew West MD          Allergies   Allergen Reactions    Crestor [Rosuvastatin]      Leg cramps    Morphine Hives     IV Morphine    Penicillins Rash     As a child       Review of Systems   Constitutional: Negative. HENT: Negative. Eyes: Negative. Respiratory: Positive for cough. Cardiovascular: Negative. Gastrointestinal: Negative. Endocrine: Negative. Genitourinary: Negative. Musculoskeletal: Negative. Skin: Negative. Allergic/Immunologic: Negative. Neurological: Negative. Hematological: Negative. Psychiatric/Behavioral: Negative. OBJECTIVE:    Physical Exam  Constitutional:       Appearance: Normal appearance. She is well-developed and well-groomed. HENT:      Head: Normocephalic and atraumatic. Right Ear: Tympanic membrane, ear canal and external ear normal. There is no impacted cerumen. Left Ear: Tympanic membrane, ear canal and external ear normal. There is no impacted cerumen. Nose: Nose normal.      Mouth/Throat:      Lips: Pink. Mouth: Mucous membranes are moist.      Dentition: Normal dentition. Pharynx: Oropharynx is clear. Uvula midline. Eyes:      General: Lids are normal.         Right eye: No discharge. Left eye: No discharge. Extraocular Movements: Extraocular movements intact. Conjunctiva/sclera: Conjunctivae normal.      Right eye: Right conjunctiva is not injected. Left eye: Left conjunctiva is not injected. Pupils: Pupils are equal, round, and reactive to light. Neck:      Thyroid: No thyromegaly. Vascular: No carotid bruit or JVD. Cardiovascular:      Rate and Rhythm: Normal rate and regular rhythm. Pulses: Normal pulses. Carotid pulses are 2+ on the right side and 2+ on the left side. Radial pulses are 2+ on the right side and 2+ on the left side. Heart sounds: Normal heart sounds, S1 normal and S2 normal. No murmur heard. Pulmonary:      Effort: Pulmonary effort is normal.      Breath sounds: Normal breath sounds. Chest:   Breasts:      Right: No supraclavicular adenopathy. Left: No supraclavicular adenopathy. Abdominal:      General: Bowel sounds are normal. There is no distension or abdominal bruit.       Palpations: Abdomen is soft. There is no mass. Hernia: No hernia is present. Musculoskeletal:         General: Normal range of motion. Cervical back: Full passive range of motion without pain, normal range of motion and neck supple. Right lower leg: No edema. Left lower leg: No edema. Lymphadenopathy:      Cervical: No cervical adenopathy. Right cervical: No superficial cervical adenopathy. Left cervical: No superficial cervical adenopathy. Upper Body:      Right upper body: No supraclavicular adenopathy. Left upper body: No supraclavicular adenopathy. Skin:     General: Skin is warm and dry. Coloration: Skin is not pale. Findings: No lesion or rash. Nails: There is no clubbing. Neurological:      Mental Status: She is alert and oriented to person, place, and time. Motor: No weakness or tremor. Coordination: Coordination normal.      Deep Tendon Reflexes: Reflexes are normal and symmetric. Psychiatric:         Attention and Perception: Attention normal.         Mood and Affect: Mood normal.         Speech: Speech normal.         Behavior: Behavior normal. Behavior is cooperative. Thought Content: Thought content normal.         Cognition and Memory: Cognition and memory normal.         Judgment: Judgment normal.        /86 (Site: Right Upper Arm, Position: Sitting, Cuff Size: Medium Adult)   Pulse 82   Temp 97.5 °F (36.4 °C) (Temporal)   Ht 5' 5\" (1.651 m)   Wt 236 lb 12.8 oz (107.4 kg)   SpO2 98%   BMI 39.41 kg/m²      ASSESSMENT:     Diagnosis Orders   1. Bronchitisacute cefTRIAXone (ROCEPHIN) 1,000 mg in lidocaine 1 % 2.86 mL IM Injection    cefdinir (OMNICEF) 300 MG capsule    triamcinolone acetonide (KENALOG-40) injection 40 mg    dexamethasone (DECADRON) injection 4 mg    COVID-19   2. Nausea and vomiting, intractability of vomiting not specified, unspecified vomiting type  ondansetron (ZOFRAN) 8 MG tablet        PLAN:    1.   Steroid shot.  Rocephin shot. Follow-up by Omnicef 300 twice daily for 10 days. Will test for COVID-19. Mucinex.   2.  Zofran  Follow-up as needed

## 2022-01-21 RX ORDER — MOMETASONE FUROATE AND FORMOTEROL FUMARATE DIHYDRATE 200; 5 UG/1; UG/1
AEROSOL RESPIRATORY (INHALATION)
Qty: 13 EACH | Refills: 2 | Status: SHIPPED | OUTPATIENT
Start: 2022-01-21

## 2022-02-11 DIAGNOSIS — E88.81 INSULIN RESISTANCE: ICD-10-CM

## 2022-02-11 DIAGNOSIS — R73.01 IFG (IMPAIRED FASTING GLUCOSE): ICD-10-CM

## 2022-02-11 RX ORDER — SEMAGLUTIDE 1.34 MG/ML
INJECTION, SOLUTION SUBCUTANEOUS
Qty: 1.5 ML | Refills: 1 | Status: SHIPPED | OUTPATIENT
Start: 2022-02-11 | End: 2022-04-05 | Stop reason: SDUPTHER

## 2022-02-22 DIAGNOSIS — F41.9 ANXIETY AND DEPRESSION: ICD-10-CM

## 2022-02-22 DIAGNOSIS — F32.A ANXIETY AND DEPRESSION: ICD-10-CM

## 2022-02-23 RX ORDER — ESCITALOPRAM OXALATE 10 MG/1
TABLET ORAL
Qty: 90 TABLET | Refills: 3 | Status: SHIPPED | OUTPATIENT
Start: 2022-02-23

## 2022-02-24 ENCOUNTER — OFFICE VISIT (OUTPATIENT)
Dept: PRIMARY CARE CLINIC | Age: 57
End: 2022-02-24
Payer: COMMERCIAL

## 2022-02-24 VITALS
HEART RATE: 84 BPM | DIASTOLIC BLOOD PRESSURE: 76 MMHG | WEIGHT: 222.2 LBS | BODY MASS INDEX: 36.98 KG/M2 | TEMPERATURE: 98.2 F | RESPIRATION RATE: 16 BRPM | OXYGEN SATURATION: 98 % | SYSTOLIC BLOOD PRESSURE: 118 MMHG

## 2022-02-24 DIAGNOSIS — R19.7 ABDOMINAL PAIN, VOMITING, AND DIARRHEA: ICD-10-CM

## 2022-02-24 DIAGNOSIS — M79.10 MUSCLE ACHE: ICD-10-CM

## 2022-02-24 DIAGNOSIS — R11.10 ABDOMINAL PAIN, VOMITING, AND DIARRHEA: ICD-10-CM

## 2022-02-24 DIAGNOSIS — R50.9 FEVER, UNSPECIFIED FEVER CAUSE: Primary | ICD-10-CM

## 2022-02-24 DIAGNOSIS — R10.9 ABDOMINAL PAIN, VOMITING, AND DIARRHEA: ICD-10-CM

## 2022-02-24 LAB
INFLUENZA A ANTIBODY: NEGATIVE
INFLUENZA B ANTIBODY: NEGATIVE
SARS-COV-2, PCR: DETECTED

## 2022-02-24 PROCEDURE — 96372 THER/PROPH/DIAG INJ SC/IM: CPT | Performed by: NURSE PRACTITIONER

## 2022-02-24 PROCEDURE — 99213 OFFICE O/P EST LOW 20 MIN: CPT | Performed by: NURSE PRACTITIONER

## 2022-02-24 PROCEDURE — 87804 INFLUENZA ASSAY W/OPTIC: CPT | Performed by: NURSE PRACTITIONER

## 2022-02-24 RX ORDER — ONDANSETRON 4 MG/1
4 TABLET, ORALLY DISINTEGRATING ORAL EVERY 8 HOURS PRN
Qty: 15 TABLET | Refills: 0 | Status: SHIPPED | OUTPATIENT
Start: 2022-02-24

## 2022-02-24 RX ORDER — ONDANSETRON 2 MG/ML
4 INJECTION INTRAMUSCULAR; INTRAVENOUS EVERY 6 HOURS PRN
Status: SHIPPED | OUTPATIENT
Start: 2022-02-24

## 2022-02-24 RX ADMIN — ONDANSETRON 4 MG: 2 INJECTION INTRAMUSCULAR; INTRAVENOUS at 14:28

## 2022-02-24 ASSESSMENT — ENCOUNTER SYMPTOMS
SCALP TENDERNESS: 0
EYE REDNESS: 0
BACK PAIN: 0
BLOATING: 0
WHEEZING: 0
PHOTOPHOBIA: 0
SINUS PRESSURE: 0
VISUAL CHANGE: 0
NAUSEA: 1
ABDOMINAL PAIN: 0
DIARRHEA: 1
SWOLLEN GLANDS: 0
FLATUS: 0
VOMITING: 1
RHINORRHEA: 0
BLURRED VISION: 0
COUGH: 0
FACIAL SWEATING: 0
EYE PAIN: 0
SORE THROAT: 0
CHEST TIGHTNESS: 0
SINUS PAIN: 0
SHORTNESS OF BREATH: 0

## 2022-02-24 NOTE — LETTER
[facility] ([facility]) [facility] [address]  Phone: [phone]  Fax: [phone]    MICHELE Jones CNP        February 24, 2022     Patient: Darin Oglesby   YOB: 1965   Date of Visit: 2/24/2022       To Whom it May Concern:    Darin Oglesby was seen in my clinic on 2/24/2022. She may return back to work when results are available and symptom free for 24 hours. If you have any questions or concerns, please don't hesitate to call.     Sincerely,         MICHELE Jones CNP

## 2022-02-24 NOTE — PROGRESS NOTES
[address] [facility] [address] [address]  Dept: [phone]  Dept Fax: [phone]  Loc: [phone]    Antonietta Courser is a 56 y.o. female who presents today for her medical conditions/complaintsas noted below. Antonietta Courser is c/o of Headache (x4 days), Fever (Patient states she has not had a fever since yesterday), Diarrhea (x 1 day), and Emesis (x 1 day)      HPI:   States that she started on Monday with fever of 102 with pounding headache that would not go way. It broke on Tuesday and she states that she started with Diarrhea and vomiting. She is still nauseated and fatigued. States that her muscles are hurting. She did an at home covid test and it said that she was negative. Headache   This is a new problem. The current episode started in the past 7 days. The problem occurs constantly. The problem has been resolved. The pain is located in the frontal region. The pain does not radiate. The pain quality is similar to prior headaches. The quality of the pain is described as aching. The pain is at a severity of 6/10. The pain is moderate. Associated symptoms include a fever, muscle aches, nausea and vomiting. Pertinent negatives include no abdominal pain, abnormal behavior, anorexia, back pain, blurred vision, coughing, dizziness, drainage, ear pain, eye pain, eye redness, facial sweating, hearing loss, insomnia, loss of balance, neck pain, numbness, phonophobia, photophobia, rhinorrhea, scalp tenderness, seizures, sinus pressure, sore throat, swollen glands, tingling, tinnitus, visual change, weakness or weight loss. Nothing aggravates the symptoms. She has tried acetaminophen and NSAIDs for the symptoms. Fever   This is a new problem. The current episode started in the past 7 days. The problem occurs constantly. The problem has been resolved. The temperature was taken using an oral thermometer.  Associated symptoms include diarrhea, headaches, muscle aches, nausea and vomiting. Pertinent negatives include no abdominal pain, chest pain, congestion, coughing, ear pain, rash, sleepiness, sore throat, urinary pain or wheezing. She has tried acetaminophen for the symptoms. The treatment provided mild relief. Diarrhea   This is a new problem. The current episode started yesterday. The problem occurs 2 to 4 times per day. The problem has been unchanged. The stool consistency is described as watery. The patient states that diarrhea does not awaken her from sleep. Associated symptoms include a fever, headaches and vomiting. Pertinent negatives include no abdominal pain, arthralgias, bloating, chills, coughing, increased  flatus, myalgias, sweats, URI or weight loss. Nothing aggravates the symptoms. She has tried nothing for the symptoms. Emesis   This is a new problem. The current episode started yesterday. The problem occurs less than 2 times per day. The problem has been unchanged. The emesis has an appearance of stomach contents. There has been no fever. Associated symptoms include diarrhea, a fever and headaches. Pertinent negatives include no abdominal pain, arthralgias, chest pain, chills, coughing, dizziness, myalgias, sweats, URI or weight loss. She has tried nothing for the symptoms.        Past Medical History:   Diagnosis Date    Anxiety and depression 11/2/2021    Pereyra esophagus     Chest pressure 10/22/2017    10/14/2014  SE Positive for clinical myocardial ischemia 10/14/2017  Cath  Moderate diffuse disease in mid and distal LAD, normal LVFX     GERD (gastroesophageal reflux disease)     Hyperlipidemia 11/2/2021    Hypertension     Obesity     Sleep apnea     sleeps with c pap     Past Surgical History:   Procedure Laterality Date    CARDIAC CATHETERIZATION  10/14/13  JDT    EF 50%    CHOLECYSTECTOMY      COLONOSCOPY  01/12/2016    Dr Leroy Washington- normal, suspect IB    COLONOSCOPY N/A 01/25/2021    Dr Marcello Ames-[facility]-5 yr recall  HYSTERECTOMY      OVARY REMOVAL      TONSILLECTOMY      UPPER GASTROINTESTINAL ENDOSCOPY  01/12/2016    Dr Arnold Julian:  Barretts esophagus    UPPER GASTROINTESTINAL ENDOSCOPY N/A 01/25/2021    Dr Dhiraj Ames-Gastritis, (+) Pereyra's, 3 yr recall     Family History   Problem Relation Age of Onset    Colon Polyps Mother     Lung Cancer Father     Esophageal Cancer Father     Liver Cancer Father     Liver Cancer Paternal Grandfather     Ovarian Cancer Paternal Grandmother     Colon Cancer Neg Hx     Rectal Cancer Neg Hx     Liver Disease Neg Hx      Social History     Tobacco Use    Smoking status: Never Smoker    Smokeless tobacco: Never Used   Substance Use Topics    Alcohol use: Yes     Alcohol/week: 0.0 standard drinks     Comment: occ      Current Outpatient Medications on File Prior to Visit   Medication Sig Dispense Refill    escitalopram (LEXAPRO) 10 MG tablet TAKE 1 TABLET BY MOUTH EVERY DAY FOR ANXIETY NEVER ABRUPTLY STOP 90 tablet 3    Semaglutide,0.25 or 0.5MG/DOS, (OZEMPIC, 0.25 OR 0.5 MG/DOSE,) 2 MG/1.5ML SOPN INJECT 0.25MG UNDER THE SKIN EVERY WEEK 1.5 mL 1    DULERA 200-5 MCG/ACT inhaler INHALE 2 PUFFS INTO THE LUNGS EVERY 12 HOURS.  13 each 2    vitamin D (ERGOCALCIFEROL) 1.25 MG (74150 UT) CAPS capsule TAKE 1 CAPSULE BY MOUTH ONE TIME PER WEEK 12 capsule 0    atorvastatin (LIPITOR) 20 MG tablet 1/2 po nightly for cholesterol x 1wk then increase to 1 po nightly 90 tablet 1    metFORMIN (GLUCOPHAGE) 500 MG tablet Take 1 tablet by mouth 2 times daily (with meals) 180 tablet 1    albuterol sulfate HFA (PROVENTIL HFA) 108 (90 Base) MCG/ACT inhaler Inhale 2 puffs into the lungs every 6 hours as needed for Wheezing 1 each 5    montelukast (SINGULAIR) 10 MG tablet Take 1 tablet by mouth daily 30 tablet 5    butalbital-acetaminophen-caffeine (FIORICET, ESGIC) -40 MG per tablet Take 1 tablet by mouth every 6 hours as needed for Headaches 60 tablet 2    tiZANidine (ZANAFLEX) 4 MG tablet TAKE 1 TABLET BY MOUTH 3 TIMES DAILY AS NEEDED (MUSCLE PAIN) 60 tablet 1    esomeprazole (NEXIUM) 40 MG delayed release capsule Take 1 capsule by mouth 2 times daily 180 capsule 1    Simethicone (MYLANTA GAS PO) Take by mouth as needed (for acid reflux)      busPIRone (BUSPAR) 10 MG tablet 1/2 - 1 tablet bid prn anxiety 180 tablet 1    ipratropium-albuterol (DUONEB) 0.5-2.5 (3) MG/3ML SOLN nebulizer solution INHALE 3MLS VIA NEBULIZER 4 TIMES DAILY AS NEEDED      Respiratory Therapy Supplies DANA Renew cpap and supplies for continuation of HS use. 1 Device 0    CPAP Machine MISC by Does not apply route 1 each 0     No current facility-administered medications on file prior to visit. Allergies   Allergen Reactions    Crestor [Rosuvastatin]      Leg cramps    Morphine Hives     IV Morphine    Penicillins Rash     As a child     Health Maintenance   Topic Date Due    Hepatitis C screen  Never done    Shingles Vaccine (1 of 2) Never done    Flu vaccine (1) 09/01/2021    COVID-19 Vaccine (3 - Booster for Pfizer series) 11/05/2021    Depression Monitoring  10/20/2022    Lipid screen  10/21/2022    Breast cancer screen  11/15/2022    Diabetes screen  10/21/2024    Colorectal Cancer Screen  01/25/2026    DTaP/Tdap/Td vaccine (2 - Td or Tdap) 06/06/2030    HIV screen  Completed    Hepatitis A vaccine  Aged Out    Hepatitis B vaccine  Aged Out    Hib vaccine  Aged Out    Meningococcal (ACWY) vaccine  Aged Out    Pneumococcal 0-64 years Vaccine  Aged Out       Subjective:   Review of Systems   Constitutional: Positive for fever. Negative for chills, fatigue and weight loss. HENT: Negative for congestion, ear pain, hearing loss, postnasal drip, rhinorrhea, sinus pressure, sinus pain, sore throat and tinnitus. Eyes: Negative for blurred vision, photophobia, pain and redness. Respiratory: Negative for cough, chest tightness, shortness of breath and wheezing.     Cardiovascular: Negative for chest pain. Gastrointestinal: Positive for diarrhea, nausea and vomiting. Negative for abdominal pain, anorexia, bloating and flatus. Genitourinary: Negative for dysuria. Musculoskeletal: Negative for arthralgias, back pain, myalgias and neck pain. Skin: Negative for rash. Neurological: Positive for headaches. Negative for dizziness, tingling, seizures, weakness, numbness and loss of balance. Psychiatric/Behavioral: The patient does not have insomnia. Objective:   /76 (Site: Right Upper Arm, Position: Sitting)   Pulse 84   Temp 98.2 °F (36.8 °C) (Temporal)   Resp 16   Wt 222 lb 3.2 oz (100.8 kg)   SpO2 98%   BMI 36.98 kg/m²    Physical Exam  Vitals and nursing note reviewed. Constitutional:       General: She is not in acute distress. Appearance: Normal appearance. She is not ill-appearing. Interventions: She is not intubated. HENT:      Head: Normocephalic. Right Ear: Tympanic membrane and ear canal normal.      Left Ear: Tympanic membrane and ear canal normal.      Nose: Nose normal.      Mouth/Throat:      Mouth: Mucous membranes are moist.      Pharynx: Oropharynx is clear. Eyes:      Pupils: Pupils are equal, round, and reactive to light. Cardiovascular:      Rate and Rhythm: Normal rate and regular rhythm. Pulses: Normal pulses. Heart sounds: Normal heart sounds. Pulmonary:      Effort: Pulmonary effort is normal. No tachypnea, bradypnea, accessory muscle usage, prolonged expiration, respiratory distress or retractions. She is not intubated. Breath sounds: Normal breath sounds and air entry. No decreased air movement or transmitted upper airway sounds. No decreased breath sounds, wheezing, rhonchi or rales. Abdominal:      General: Abdomen is flat. Bowel sounds are normal. There is no distension. Palpations: Abdomen is soft. Tenderness: There is no abdominal tenderness. There is no guarding or rebound.    Musculoskeletal: General: Normal range of motion. Cervical back: Normal range of motion and neck supple. Skin:     General: Skin is warm and dry. Neurological:      Mental Status: She is alert. No results found for this visit on 02/24/22. Assessment:      Diagnosis Orders   1. Fever, unspecified fever cause  COVID-19    POCT Influenza A/B   2. Abdominal pain, vomiting, and diarrhea  ondansetron (ZOFRAN ODT) 4 MG disintegrating tablet    ondansetron (ZOFRAN) injection 4 mg   3. Muscle ache         Plan:   Tia was seen today for headache, fever, diarrhea and emesis. Diagnoses and all orders for this visit:    Fever, unspecified fever cause  -     COVID-19  -     POCT Influenza A/B    Abdominal pain, vomiting, and diarrhea  -     ondansetron (ZOFRAN ODT) 4 MG disintegrating tablet; Take 1 tablet by mouth every 8 hours as needed for Nausea or Vomiting  -     ondansetron (ZOFRAN) injection 4 mg    Muscle ache    Other orders  -     COVID-19  -     COVID-19     Exam consistent with viral illness. Typically viruses pass on their own in 7-10 days. You may take Over the counter medications as directed for cough, congestion, discomfort, or fever. If symptoms worsen or do not improve then call the clinic for further instructions or follow up with your Primary Care provider. Based on the clinical exam findings and patient's reported symptoms, I do not suspect acute abdomen at this time. Gastroenteritis based on the physical exam findings. Encouraged to keep self hydrated by increasing fluid intake as discussed. You may have been prescribed medication to help alleviate nausea symptoms. Please keep your diet light or do not consume dairy or greasy foods. Advance your diet as tolerated. Patient agreeable to treatment plan. Educational materials provided on AVS.  Follow up if symptoms do not improve/worsen in the office or ER if applicable, otherwise follow up with your Primary Provider.     No follow-ups on file.     Patient given educational materials- see patient instructions. Discussed use, benefit, and side effects of prescribedmedications. All patient questions answered. Pt voiced understanding.      Electronically signed by MICHELE White CNP on 2/24/2022 at 2:25 PM

## 2022-02-24 NOTE — PATIENT INSTRUCTIONS
Exam consistent with viral illness. Typically viruses pass on their own in 7-10 days. You may take Over the counter medications as directed for cough, congestion, discomfort, or fever. If symptoms worsen or do not improve then call the clinic for further instructions or follow up with your Primary Care provider. [unfilled]        Based on the clinical exam findings and patient's reported symptoms, I do not suspect acute abdomen at this time. Gastroenteritis based on the physical exam findings. Encouraged to keep self hydrated by increasing fluid intake as discussed. You may have been prescribed medication to help alleviate nausea symptoms. Please keep your diet light or do not consume dairy or greasy foods. Advance your diet as tolerated. Patient agreeable to treatment plan. Educational materials provided on AVS.  Follow up if symptoms do not improve/worsen in the office or ER if applicable, otherwise follow up with your Primary Provider.

## 2022-03-09 DIAGNOSIS — K22.70 BARRETT'S ESOPHAGUS DETERMINED BY ENDOSCOPY: ICD-10-CM

## 2022-03-09 DIAGNOSIS — R12 CHRONIC HEARTBURN: ICD-10-CM

## 2022-03-10 RX ORDER — ESOMEPRAZOLE MAGNESIUM 40 MG/1
CAPSULE, DELAYED RELEASE ORAL
Qty: 180 CAPSULE | Refills: 1 | Status: SHIPPED | OUTPATIENT
Start: 2022-03-10 | End: 2022-09-02

## 2022-04-05 ENCOUNTER — PATIENT MESSAGE (OUTPATIENT)
Dept: FAMILY MEDICINE CLINIC | Age: 57
End: 2022-04-05

## 2022-04-05 DIAGNOSIS — Z87.898 HISTORY OF HEADACHE: ICD-10-CM

## 2022-04-05 DIAGNOSIS — E88.81 INSULIN RESISTANCE: ICD-10-CM

## 2022-04-05 DIAGNOSIS — R73.01 IFG (IMPAIRED FASTING GLUCOSE): ICD-10-CM

## 2022-04-05 RX ORDER — SEMAGLUTIDE 1.34 MG/ML
0.5 INJECTION, SOLUTION SUBCUTANEOUS WEEKLY
Qty: 1.5 ML | Refills: 5 | Status: SHIPPED | OUTPATIENT
Start: 2022-04-05

## 2022-04-05 RX ORDER — BUTALBITAL, ACETAMINOPHEN AND CAFFEINE 50; 325; 40 MG/1; MG/1; MG/1
1 TABLET ORAL EVERY 6 HOURS PRN
Qty: 60 TABLET | Refills: 0 | Status: SHIPPED | OUTPATIENT
Start: 2022-04-05 | End: 2022-10-27 | Stop reason: SDUPTHER

## 2022-04-05 NOTE — TELEPHONE ENCOUNTER
From: Caleb Garsia  Sent: 4/5/2022 12:56 PM CDT  To: [address] [place], [place] [address]janice Das Clinical Staff  Subject: Med Refill    I am also out of my Fioricet as well but didnt know if I needed to come in to get that one refilled. Thanks!

## 2022-04-23 DIAGNOSIS — R06.2 WHEEZE: ICD-10-CM

## 2022-04-25 RX ORDER — MONTELUKAST SODIUM 10 MG/1
TABLET ORAL
Qty: 90 TABLET | Refills: 1 | Status: SHIPPED | OUTPATIENT
Start: 2022-04-25

## 2022-04-29 ENCOUNTER — PATIENT MESSAGE (OUTPATIENT)
Dept: FAMILY MEDICINE CLINIC | Age: 57
End: 2022-04-29

## 2022-04-29 DIAGNOSIS — I10 HYPERTENSION, UNSPECIFIED TYPE: Primary | ICD-10-CM

## 2022-04-29 RX ORDER — AMLODIPINE BESYLATE 5 MG/1
5 TABLET ORAL DAILY
Qty: 30 TABLET | Refills: 0 | Status: SHIPPED | OUTPATIENT
Start: 2022-04-29
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
## 2022-08-24 DIAGNOSIS — B37.2 SKIN YEAST INFECTION: ICD-10-CM

## 2022-08-24 RX ORDER — NYSTATIN 100000 [USP'U]/G
POWDER TOPICAL
Qty: 60 G | Refills: 1 | Status: SHIPPED | OUTPATIENT
Start: 2022-08-24

## 2022-09-02 DIAGNOSIS — R12 CHRONIC HEARTBURN: ICD-10-CM

## 2022-09-02 DIAGNOSIS — K22.70 BARRETT'S ESOPHAGUS DETERMINED BY ENDOSCOPY: ICD-10-CM

## 2022-09-02 RX ORDER — ESOMEPRAZOLE MAGNESIUM 40 MG/1
CAPSULE, DELAYED RELEASE ORAL
Qty: 180 CAPSULE | Refills: 0 | Status: SHIPPED | OUTPATIENT
Start: 2022-09-02

## 2022-10-27 ENCOUNTER — OFFICE VISIT (OUTPATIENT)
Dept: FAMILY MEDICINE CLINIC | Age: 57
End: 2022-10-27
Payer: COMMERCIAL

## 2022-10-27 VITALS
HEART RATE: 75 BPM | TEMPERATURE: 97 F | OXYGEN SATURATION: 99 % | BODY MASS INDEX: 36.99 KG/M2 | SYSTOLIC BLOOD PRESSURE: 130 MMHG | DIASTOLIC BLOOD PRESSURE: 76 MMHG | HEIGHT: 65 IN | WEIGHT: 222 LBS

## 2022-10-27 DIAGNOSIS — A08.4 VIRAL GASTROENTERITIS: ICD-10-CM

## 2022-10-27 DIAGNOSIS — A08.4 VIRAL GASTROENTERITIS: Primary | ICD-10-CM

## 2022-10-27 DIAGNOSIS — Z87.898 HISTORY OF HEADACHE: ICD-10-CM

## 2022-10-27 DIAGNOSIS — M54.50 CHRONIC LOW BACK PAIN, UNSPECIFIED BACK PAIN LATERALITY, UNSPECIFIED WHETHER SCIATICA PRESENT: ICD-10-CM

## 2022-10-27 DIAGNOSIS — G89.29 CHRONIC LOW BACK PAIN, UNSPECIFIED BACK PAIN LATERALITY, UNSPECIFIED WHETHER SCIATICA PRESENT: ICD-10-CM

## 2022-10-27 DIAGNOSIS — Z11.59 NEED FOR HEPATITIS C SCREENING TEST: ICD-10-CM

## 2022-10-27 LAB
ALBUMIN SERPL-MCNC: 4.5 G/DL (ref 3.5–5.2)
ALP BLD-CCNC: 104 U/L (ref 35–104)
ALT SERPL-CCNC: 48 U/L (ref 5–33)
ANION GAP SERPL CALCULATED.3IONS-SCNC: 12 MMOL/L (ref 7–19)
AST SERPL-CCNC: 37 U/L (ref 5–32)
BASOPHILS ABSOLUTE: 0.1 K/UL (ref 0–0.2)
BASOPHILS RELATIVE PERCENT: 1.2 % (ref 0–1)
BILIRUB SERPL-MCNC: 0.4 MG/DL (ref 0.2–1.2)
BUN BLDV-MCNC: 12 MG/DL (ref 6–20)
CALCIUM SERPL-MCNC: 9.6 MG/DL (ref 8.6–10)
CHLORIDE BLD-SCNC: 102 MMOL/L (ref 98–111)
CO2: 23 MMOL/L (ref 22–29)
CREAT SERPL-MCNC: 0.9 MG/DL (ref 0.5–0.9)
EOSINOPHILS ABSOLUTE: 0.1 K/UL (ref 0–0.6)
EOSINOPHILS RELATIVE PERCENT: 2.2 % (ref 0–5)
GFR SERPL CREATININE-BSD FRML MDRD: >60 ML/MIN/{1.73_M2}
GLUCOSE BLD-MCNC: 82 MG/DL (ref 74–109)
HCT VFR BLD CALC: 42.7 % (ref 37–47)
HEMOGLOBIN: 13.5 G/DL (ref 12–16)
HEPATITIS C ANTIBODY INTERPRETATION: NORMAL
IMMATURE GRANULOCYTES #: 0 K/UL
INFLUENZA A ANTIBODY: NORMAL
INFLUENZA B ANTIBODY: NORMAL
LYMPHOCYTES ABSOLUTE: 1.2 K/UL (ref 1.1–4.5)
LYMPHOCYTES RELATIVE PERCENT: 29.8 % (ref 20–40)
MCH RBC QN AUTO: 30.4 PG (ref 27–31)
MCHC RBC AUTO-ENTMCNC: 31.6 G/DL (ref 33–37)
MCV RBC AUTO: 96.2 FL (ref 81–99)
MONOCYTES ABSOLUTE: 0.4 K/UL (ref 0–0.9)
MONOCYTES RELATIVE PERCENT: 9.1 % (ref 0–10)
NEUTROPHILS ABSOLUTE: 2.4 K/UL (ref 1.5–7.5)
NEUTROPHILS RELATIVE PERCENT: 57.5 % (ref 50–65)
PDW BLD-RTO: 13.9 % (ref 11.5–14.5)
PLATELET # BLD: 283 K/UL (ref 130–400)
PMV BLD AUTO: 10.2 FL (ref 9.4–12.3)
POTASSIUM SERPL-SCNC: 4.2 MMOL/L (ref 3.5–5)
RBC # BLD: 4.44 M/UL (ref 4.2–5.4)
SARS-COV-2, PCR: NOT DETECTED
SODIUM BLD-SCNC: 137 MMOL/L (ref 136–145)
TOTAL PROTEIN: 7.8 G/DL (ref 6.6–8.7)
WBC # BLD: 4.2 K/UL (ref 4.8–10.8)

## 2022-10-27 PROCEDURE — 87804 INFLUENZA ASSAY W/OPTIC: CPT | Performed by: FAMILY MEDICINE

## 2022-10-27 PROCEDURE — 99214 OFFICE O/P EST MOD 30 MIN: CPT | Performed by: FAMILY MEDICINE

## 2022-10-27 RX ORDER — ONDANSETRON 8 MG/1
8 TABLET, ORALLY DISINTEGRATING ORAL EVERY 8 HOURS PRN
Qty: 30 TABLET | Refills: 1 | Status: SHIPPED | OUTPATIENT
Start: 2022-10-27

## 2022-10-27 RX ORDER — TIZANIDINE 4 MG/1
4 TABLET ORAL 3 TIMES DAILY PRN
Qty: 60 TABLET | Refills: 1 | Status: SHIPPED | OUTPATIENT
Start: 2022-10-27

## 2022-10-27 RX ORDER — BUTALBITAL, ACETAMINOPHEN AND CAFFEINE 50; 325; 40 MG/1; MG/1; MG/1
1 TABLET ORAL EVERY 6 HOURS PRN
Qty: 60 TABLET | Refills: 0 | Status: SHIPPED | OUTPATIENT
Start: 2022-10-27

## 2022-10-27 RX ORDER — ONDANSETRON 4 MG/1
4 TABLET, ORALLY DISINTEGRATING ORAL EVERY 8 HOURS PRN
Qty: 15 TABLET | Refills: 0 | Status: CANCELLED | OUTPATIENT
Start: 2022-10-27

## 2022-10-27 ASSESSMENT — ENCOUNTER SYMPTOMS
VOMITING: 1
DIARRHEA: 1
EYES NEGATIVE: 1
RESPIRATORY NEGATIVE: 1
ABDOMINAL PAIN: 1
ALLERGIC/IMMUNOLOGIC NEGATIVE: 1
NAUSEA: 1

## 2022-10-27 NOTE — PROGRESS NOTES
SUBJECTIVE:    Patient ID: Rain Modi is a 56 y. o.female. HPI:   Here for follow-up of multiple medical problems  Patient is a 61-year-old female. She complains of 3-day history of abdominal pain associated with nausea vomiting and diarrhea. Body aches. Severe headaches. She request a refill on Zofran for nausea and Fioricet for headaches. She takes Fioricet for headaches occasionally. She also have issues with her back and takes a muscle relaxer as needed. She would like to have a refill on the medication. She knows about 2 mixed medications.        Past Medical History:   Diagnosis Date    Anxiety and depression 11/2/2021    Pereyra esophagus     Chest pressure 10/22/2017    10/14/2014  SE Positive for clinical myocardial ischemia 10/14/2017  Cath  Moderate diffuse disease in mid and distal LAD, normal LVFX     GERD (gastroesophageal reflux disease)     Hyperlipidemia 11/2/2021    Hypertension     Obesity     Sleep apnea     sleeps with c pap      Current Outpatient Medications   Medication Sig Dispense Refill    butalbital-acetaminophen-caffeine (FIORICET, ESGIC) -40 MG per tablet Take 1 tablet by mouth every 6 hours as needed for Headaches 60 tablet 0    tiZANidine (ZANAFLEX) 4 MG tablet Take 1 tablet by mouth 3 times daily as needed (muscle pain) 60 tablet 1    ondansetron (ZOFRAN ODT) 8 MG TBDP disintegrating tablet Place 1 tablet under the tongue every 8 hours as needed for Nausea or Vomiting 30 tablet 1    esomeprazole (NEXIUM) 40 MG delayed release capsule TAKE 1 CAPSULE BY MOUTH TWICE A  capsule 0    nystatin (MYCOSTATIN) 867151 UNIT/GM powder APPLY 3 TIMES DAILY TO ABDOMINAL AREA 60 g 1    amLODIPine (NORVASC) 5 MG tablet Take 1 tablet by mouth daily 30 tablet 0    montelukast (SINGULAIR) 10 MG tablet TAKE 1 TABLET BY MOUTH EVERY DAY 90 tablet 1    Semaglutide,0.25 or 0.5MG/DOS, (OZEMPIC, 0.25 OR 0.5 MG/DOSE,) 2 MG/1.5ML SOPN Inject 0.5 mg into the skin once a week 1.5 mL 5 ondansetron (ZOFRAN ODT) 4 MG disintegrating tablet Take 1 tablet by mouth every 8 hours as needed for Nausea or Vomiting 15 tablet 0    escitalopram (LEXAPRO) 10 MG tablet TAKE 1 TABLET BY MOUTH EVERY DAY FOR ANXIETY NEVER ABRUPTLY STOP 90 tablet 3    DULERA 200-5 MCG/ACT inhaler INHALE 2 PUFFS INTO THE LUNGS EVERY 12 HOURS. 13 each 2    vitamin D (ERGOCALCIFEROL) 1.25 MG (14475 UT) CAPS capsule TAKE 1 CAPSULE BY MOUTH ONE TIME PER WEEK 12 capsule 0    atorvastatin (LIPITOR) 20 MG tablet 1/2 po nightly for cholesterol x 1wk then increase to 1 po nightly 90 tablet 1    metFORMIN (GLUCOPHAGE) 500 MG tablet Take 1 tablet by mouth 2 times daily (with meals) 180 tablet 1    albuterol sulfate HFA (PROVENTIL HFA) 108 (90 Base) MCG/ACT inhaler Inhale 2 puffs into the lungs every 6 hours as needed for Wheezing 1 each 5    Simethicone (MYLANTA GAS PO) Take by mouth as needed (for acid reflux)      busPIRone (BUSPAR) 10 MG tablet 1/2 - 1 tablet bid prn anxiety 180 tablet 1    ipratropium-albuterol (DUONEB) 0.5-2.5 (3) MG/3ML SOLN nebulizer solution INHALE 3MLS VIA NEBULIZER 4 TIMES DAILY AS NEEDED      Respiratory Therapy Supplies DANA Renew cpap and supplies for continuation of HS use. 1 Device 0    CPAP Machine MISC by Does not apply route 1 each 0     Current Facility-Administered Medications   Medication Dose Route Frequency Provider Last Rate Last Admin    ondansetron (ZOFRAN) injection 4 mg  4 mg IntraMUSCular Q6H PRN MICHELE Flores - CNP   4 mg at 02/24/22 1428      Allergies   Allergen Reactions    Crestor [Rosuvastatin]      Leg cramps    Morphine Hives     IV Morphine    Penicillins Rash     As a child       Review of Systems   Constitutional: Negative. HENT: Negative. Eyes: Negative. Respiratory: Negative. Cardiovascular: Negative. Gastrointestinal:  Positive for abdominal pain, diarrhea, nausea and vomiting. Endocrine: Negative. Genitourinary: Negative. Musculoskeletal: Negative. Skin: Negative. Allergic/Immunologic: Negative. Neurological: Negative. Hematological: Negative. Psychiatric/Behavioral: Negative. OBJECTIVE:    Physical Exam  Constitutional:       Appearance: Normal appearance. She is well-developed and well-groomed. HENT:      Head: Normocephalic and atraumatic. Right Ear: Tympanic membrane, ear canal and external ear normal. There is no impacted cerumen. Left Ear: Tympanic membrane, ear canal and external ear normal. There is no impacted cerumen. Nose: Nose normal.      Mouth/Throat:      Lips: Pink. Mouth: Mucous membranes are moist.      Dentition: Normal dentition. Pharynx: Oropharynx is clear. Uvula midline. Eyes:      General: Lids are normal.         Right eye: No discharge. Left eye: No discharge. Extraocular Movements: Extraocular movements intact. Conjunctiva/sclera: Conjunctivae normal.      Right eye: Right conjunctiva is not injected. Left eye: Left conjunctiva is not injected. Pupils: Pupils are equal, round, and reactive to light. Neck:      Thyroid: No thyromegaly. Vascular: No carotid bruit or JVD. Cardiovascular:      Rate and Rhythm: Normal rate and regular rhythm. Pulses: Normal pulses. Carotid pulses are 2+ on the right side and 2+ on the left side. Radial pulses are 2+ on the right side and 2+ on the left side. Heart sounds: Normal heart sounds, S1 normal and S2 normal. No murmur heard. Pulmonary:      Effort: Pulmonary effort is normal.      Breath sounds: Normal breath sounds. Abdominal:      General: Bowel sounds are normal. There is no distension or abdominal bruit. Palpations: Abdomen is soft. There is no mass. Hernia: No hernia is present. Musculoskeletal:         General: Normal range of motion. Cervical back: Full passive range of motion without pain, normal range of motion and neck supple.       Right lower leg: No edema. Left lower leg: No edema. Lymphadenopathy:      Cervical: No cervical adenopathy. Right cervical: No superficial cervical adenopathy. Left cervical: No superficial cervical adenopathy. Upper Body:      Right upper body: No supraclavicular adenopathy. Left upper body: No supraclavicular adenopathy. Skin:     General: Skin is warm and dry. Coloration: Skin is not pale. Findings: No lesion or rash. Nails: There is no clubbing. Neurological:      Mental Status: She is alert and oriented to person, place, and time. Motor: No weakness or tremor. Coordination: Coordination normal.      Deep Tendon Reflexes: Reflexes are normal and symmetric. Psychiatric:         Attention and Perception: Attention normal.         Mood and Affect: Mood normal.         Speech: Speech normal.         Behavior: Behavior normal. Behavior is cooperative. Thought Content: Thought content normal.         Cognition and Memory: Cognition and memory normal.         Judgment: Judgment normal.      /76 (Site: Left Upper Arm, Position: Sitting, Cuff Size: Medium Adult)   Pulse 75   Temp 97 °F (36.1 °C) (Temporal)   Ht 5' 5\" (1.651 m)   Wt 222 lb (100.7 kg)   SpO2 99%   BMI 36.94 kg/m²      ASSESSMENT:     Diagnosis Orders   1. Viral gastroenteritis-acute POCT Influenza A/B    CBC with Auto Differential    Comprehensive Metabolic Panel    ondansetron (ZOFRAN ODT) 8 MG TBDP disintegrating tablet    COVID-19      2. History of headache  butalbital-acetaminophen-caffeine (FIORICET, ESGIC) -40 MG per tablet      3. Chronic low back pain, unspecified back pain laterality, unspecified whether sciatica present  tiZANidine (ZANAFLEX) 4 MG tablet      4. Need for hepatitis C screening test  Hepatitis C Antibody           PLAN:    1. Influenza and COVID swab. Zofran for nausea. Drink plenty of fluid. Blood work. 2.  Chalice Christine and continue medication  3.   Continue medication to use as needed  4.   Blood work  Follow-up as needed

## 2022-10-28 DIAGNOSIS — A08.4 VIRAL GASTROENTERITIS: Primary | ICD-10-CM

## 2022-10-28 RX ORDER — DIPHENOXYLATE HYDROCHLORIDE AND ATROPINE SULFATE 2.5; .025 MG/1; MG/1
1 TABLET ORAL 4 TIMES DAILY PRN
Qty: 40 TABLET | Refills: 0 | Status: SHIPPED | OUTPATIENT
Start: 2022-10-28 | End: 2022-11-07

## 2022-11-07 ENCOUNTER — PATIENT MESSAGE (OUTPATIENT)
Dept: FAMILY MEDICINE CLINIC | Age: 57
End: 2022-11-07

## 2022-11-07 DIAGNOSIS — J40 BRONCHITIS: Primary | ICD-10-CM

## 2022-11-07 DIAGNOSIS — R06.2 WHEEZE: ICD-10-CM

## 2022-11-07 RX ORDER — BENZONATATE 200 MG/1
200 CAPSULE ORAL 3 TIMES DAILY PRN
Qty: 30 CAPSULE | Refills: 0 | Status: SHIPPED | OUTPATIENT
Start: 2022-11-07 | End: 2022-11-14

## 2022-11-07 RX ORDER — AZITHROMYCIN 250 MG/1
TABLET, FILM COATED ORAL
Qty: 6 TABLET | Refills: 0 | Status: SHIPPED | OUTPATIENT
Start: 2022-11-07 | End: 2022-11-17 | Stop reason: ALTCHOICE

## 2022-11-07 NOTE — TELEPHONE ENCOUNTER
From: Larisa Botello  To: Dr. Harmeet Daniels: 11/7/2022 10:22 AM CST  Subject: Upper Respiratory     I have had upper respiratory since last Wednesday, coughing, headache, and hoarseness. Yesterday I started Mucinex. This morning I have coughed up a small amount of pale yellow sputum. My question is: can I have a [employer] perles called in or some cough syrup?

## 2022-11-09 RX ORDER — ALBUTEROL SULFATE 90 UG/1
2 AEROSOL, METERED RESPIRATORY (INHALATION) EVERY 6 HOURS PRN
Qty: 1 EACH | Refills: 5 | Status: SHIPPED | OUTPATIENT
Start: 2022-11-09

## 2022-11-09 RX ORDER — PREDNISONE 20 MG/1
20 TABLET ORAL 2 TIMES DAILY
Qty: 8 TABLET | Refills: 0 | Status: SHIPPED | OUTPATIENT
Start: 2022-11-09 | End: 2022-11-13

## 2022-11-16 DIAGNOSIS — R06.2 WHEEZE: Primary | ICD-10-CM

## 2022-11-16 RX ORDER — PREDNISONE 20 MG/1
20 TABLET ORAL 2 TIMES DAILY
Qty: 6 TABLET | Refills: 0 | Status: SHIPPED | OUTPATIENT
Start: 2022-11-16 | End: 2022-11-17 | Stop reason: ALTCHOICE

## 2022-11-17 ENCOUNTER — HOSPITAL ENCOUNTER (OUTPATIENT)
Dept: GENERAL RADIOLOGY | Age: 57
Discharge: HOME | End: 2022-11-17
Payer: COMMERCIAL

## 2022-11-17 ENCOUNTER — OFFICE VISIT (OUTPATIENT)
Dept: PRIMARY CARE CLINIC | Age: 57
End: 2022-11-17
Payer: COMMERCIAL

## 2022-11-17 VITALS
TEMPERATURE: 98 F | DIASTOLIC BLOOD PRESSURE: 70 MMHG | HEART RATE: 85 BPM | BODY MASS INDEX: 37.94 KG/M2 | WEIGHT: 228 LBS | OXYGEN SATURATION: 98 % | SYSTOLIC BLOOD PRESSURE: 122 MMHG

## 2022-11-17 DIAGNOSIS — J20.9 ACUTE BRONCHITIS, UNSPECIFIED ORGANISM: ICD-10-CM

## 2022-11-17 DIAGNOSIS — J20.9 ACUTE BRONCHITIS, UNSPECIFIED ORGANISM: Primary | ICD-10-CM

## 2022-11-17 DIAGNOSIS — R06.02 SHORTNESS OF BREATH: ICD-10-CM

## 2022-11-17 DIAGNOSIS — R06.89 DECREASED LUNG SOUNDS: ICD-10-CM

## 2022-11-17 PROCEDURE — 71046 X-RAY EXAM CHEST 2 VIEWS: CPT

## 2022-11-17 PROCEDURE — 99213 OFFICE O/P EST LOW 20 MIN: CPT | Performed by: NURSE PRACTITIONER

## 2022-11-17 PROCEDURE — 96372 THER/PROPH/DIAG INJ SC/IM: CPT | Performed by: NURSE PRACTITIONER

## 2022-11-17 RX ORDER — DEXAMETHASONE SODIUM PHOSPHATE 10 MG/ML
10 INJECTION INTRAMUSCULAR; INTRAVENOUS ONCE
Status: COMPLETED | OUTPATIENT
Start: 2022-11-17 | End: 2022-11-17

## 2022-11-17 RX ADMIN — DEXAMETHASONE SODIUM PHOSPHATE 10 MG: 10 INJECTION INTRAMUSCULAR; INTRAVENOUS at 15:51

## 2022-11-17 ASSESSMENT — ENCOUNTER SYMPTOMS
CONSTIPATION: 0
SHORTNESS OF BREATH: 1
WHEEZING: 1
EYE ITCHING: 0
EYE DISCHARGE: 0
COLOR CHANGE: 0
DIARRHEA: 0
SORE THROAT: 1
CHEST TIGHTNESS: 1
RHINORRHEA: 0
VOMITING: 0
ABDOMINAL PAIN: 0
COUGH: 1
BLOOD IN STOOL: 0
NAUSEA: 0
SINUS PRESSURE: 0

## 2022-11-17 NOTE — PATIENT INSTRUCTIONS
- Chest xray today; will call with results  - Take steroids as prescribed by PCP beginning tomorrow  - Use albuterol as needed for wheezing  - Dexamethasone injection today in office  - Continue OTC mucinex  - If SOA worsens, go to the ER.   - The patient is to follow up with PCP or return to clinic if symptoms worsen/fail to improve.

## 2022-11-17 NOTE — PROGRESS NOTES
[address] [facility] [address] [facility] [address] [address]  Dept: [phone]  Dept Fax: [phone]  Loc: [phone]    Suyapa Dial is a 56 y.o. female who presents today for her medical conditions/complaints as noted below. Suyapa Dial is complaining of Cough, Congestion, and Pharyngitis        HPI:   Cough  This is a new problem. The current episode started 1 to 4 weeks ago (3 weeks ago). The problem has been waxing and waning. The problem occurs every few minutes. The cough is Non-productive. Associated symptoms include a sore throat, shortness of breath and wheezing. Pertinent negatives include no chest pain, chills, ear pain, fever, headaches, myalgias, rash or rhinorrhea. The symptoms are aggravated by lying down. She has tried a beta-agonist inhaler and oral steroids (zpak) for the symptoms. The treatment provided mild relief.    Has tested several times for COVID which were negative    Past Medical History:   Diagnosis Date    Anxiety and depression 11/2/2021    Pereyra esophagus     Chest pressure 10/22/2017    10/14/2014  SE Positive for clinical myocardial ischemia 10/14/2017  Cath  Moderate diffuse disease in mid and distal LAD, normal LVFX     GERD (gastroesophageal reflux disease)     Hyperlipidemia 11/2/2021    Hypertension     Obesity     Sleep apnea     sleeps with c pap       Past Surgical History:   Procedure Laterality Date    CARDIAC CATHETERIZATION  10/14/13  JDT    EF 50%    CHOLECYSTECTOMY      COLONOSCOPY  01/12/2016    Dr Jazz Wise- normal, suspect IB    COLONOSCOPY N/A 01/25/2021    Dr Mimi Ames-[facility]-5 yr recall    HYSTERECTOMY (CERVIX STATUS UNKNOWN)      OVARY REMOVAL      TONSILLECTOMY      UPPER GASTROINTESTINAL ENDOSCOPY  01/12/2016    Dr Mary Gibson:  Barretts esophagus    UPPER GASTROINTESTINAL ENDOSCOPY N/A 01/25/2021    Dr Mimi Ames-Gastritis, (+) Pereyra's, 3 yr recall       Family History   Problem Relation Age of Onset    Colon Polyps Mother     Lung Cancer Father     Esophageal Cancer Father     Liver Cancer Father     Liver Cancer Paternal Grandfather     Ovarian Cancer Paternal Grandmother     Colon Cancer Neg Hx     Rectal Cancer Neg Hx     Liver Disease Neg Hx        Social History     Tobacco Use    Smoking status: Never    Smokeless tobacco: Never   Substance Use Topics    Alcohol use: Yes     Alcohol/week: 0.0 standard drinks     Comment: occ        Current Outpatient Medications   Medication Sig Dispense Refill    albuterol sulfate HFA (PROVENTIL HFA) 108 (90 Base) MCG/ACT inhaler Inhale 2 puffs into the lungs every 6 hours as needed for Wheezing 1 each 5    butalbital-acetaminophen-caffeine (FIORICET, ESGIC) -40 MG per tablet Take 1 tablet by mouth every 6 hours as needed for Headaches 60 tablet 0    tiZANidine (ZANAFLEX) 4 MG tablet Take 1 tablet by mouth 3 times daily as needed (muscle pain) 60 tablet 1    ondansetron (ZOFRAN ODT) 8 MG TBDP disintegrating tablet Place 1 tablet under the tongue every 8 hours as needed for Nausea or Vomiting 30 tablet 1    esomeprazole (NEXIUM) 40 MG delayed release capsule TAKE 1 CAPSULE BY MOUTH TWICE A  capsule 0    nystatin (MYCOSTATIN) 689036 UNIT/GM powder APPLY 3 TIMES DAILY TO ABDOMINAL AREA 60 g 1    amLODIPine (NORVASC) 5 MG tablet Take 1 tablet by mouth daily 30 tablet 0    montelukast (SINGULAIR) 10 MG tablet TAKE 1 TABLET BY MOUTH EVERY DAY 90 tablet 1    Semaglutide,0.25 or 0.5MG/DOS, (OZEMPIC, 0.25 OR 0.5 MG/DOSE,) 2 MG/1.5ML SOPN Inject 0.5 mg into the skin once a week 1.5 mL 5    ondansetron (ZOFRAN ODT) 4 MG disintegrating tablet Take 1 tablet by mouth every 8 hours as needed for Nausea or Vomiting 15 tablet 0    escitalopram (LEXAPRO) 10 MG tablet TAKE 1 TABLET BY MOUTH EVERY DAY FOR ANXIETY NEVER ABRUPTLY STOP 90 tablet 3    DULERA 200-5 MCG/ACT inhaler INHALE 2 PUFFS INTO THE LUNGS EVERY 12 HOURS.  13 each 2    vitamin D (ERGOCALCIFEROL) 1.25 MG (38336 UT) CAPS capsule TAKE 1 CAPSULE BY MOUTH ONE TIME PER WEEK 12 capsule 0    atorvastatin (LIPITOR) 20 MG tablet 1/2 po nightly for cholesterol x 1wk then increase to 1 po nightly 90 tablet 1    metFORMIN (GLUCOPHAGE) 500 MG tablet Take 1 tablet by mouth 2 times daily (with meals) 180 tablet 1    Simethicone (MYLANTA GAS PO) Take by mouth as needed (for acid reflux)      busPIRone (BUSPAR) 10 MG tablet 1/2 - 1 tablet bid prn anxiety 180 tablet 1    ipratropium-albuterol (DUONEB) 0.5-2.5 (3) MG/3ML SOLN nebulizer solution INHALE 3MLS VIA NEBULIZER 4 TIMES DAILY AS NEEDED      Respiratory Therapy Supplies DANA Renew cpap and supplies for continuation of HS use. 1 Device 0    CPAP Machine MISC by Does not apply route 1 each 0     Current Facility-Administered Medications   Medication Dose Route Frequency Provider Last Rate Last Admin    ondansetron (ZOFRAN) injection 4 mg  4 mg IntraMUSCular Q6H PRN MICHELE Boyd - CNP   4 mg at 02/24/22 1428       Allergies   Allergen Reactions    Crestor [Rosuvastatin]      Leg cramps    Morphine Hives     IV Morphine    Penicillins Rash     As a child       Health Maintenance   Topic Date Due    Shingles vaccine (1 of 2) Never done    COVID-19 Vaccine (3 - Booster for Pfizer series) 07/31/2021    Flu vaccine (1) 08/01/2022    Depression Monitoring  10/20/2022    Lipids  10/21/2022    Breast cancer screen  11/15/2022    Colorectal Cancer Screen  01/25/2026    DTaP/Tdap/Td vaccine (2 - Td or Tdap) 06/06/2030    Hepatitis C screen  Completed    HIV screen  Completed    Hepatitis A vaccine  Aged Out    Hib vaccine  Aged Out    Meningococcal (ACWY) vaccine  Aged Out    Pneumococcal 0-64 years Vaccine  Aged Out       Subjective:   Review of Systems   Constitutional:  Positive for fatigue. Negative for activity change, appetite change, chills and fever. HENT:  Positive for congestion and sore throat. Negative for ear pain, rhinorrhea and sinus pressure.     Eyes:  Negative for discharge and itching. Respiratory:  Positive for cough, chest tightness (intermittent), shortness of breath and wheezing. Cardiovascular:  Negative for chest pain and palpitations. Gastrointestinal:  Negative for abdominal pain, blood in stool, constipation, diarrhea, nausea and vomiting. Musculoskeletal:  Negative for myalgias. Skin:  Negative for color change and rash. Neurological:  Negative for dizziness and headaches. All other systems reviewed and are negative. Objective    Physical Exam  Vitals and nursing note reviewed. Constitutional:       General: She is not in acute distress. Appearance: Normal appearance. HENT:      Head: Normocephalic and atraumatic. Right Ear: Ear canal normal. A middle ear effusion is present. Left Ear: Ear canal normal. A middle ear effusion is present. Mouth/Throat:      Mouth: Mucous membranes are moist.      Pharynx: No posterior oropharyngeal erythema. Eyes:      Extraocular Movements: Extraocular movements intact. Pupils: Pupils are equal, round, and reactive to light. Cardiovascular:      Rate and Rhythm: Normal rate and regular rhythm. Pulses: Normal pulses. Heart sounds: Normal heart sounds. No murmur heard. Pulmonary:      Effort: Pulmonary effort is normal. No respiratory distress. Breath sounds: Decreased air movement present. Examination of the right-lower field reveals decreased breath sounds. Examination of the left-lower field reveals decreased breath sounds. Decreased breath sounds and wheezing (mild, intermittent, cleared with coughing) present. Skin:     General: Skin is warm. Capillary Refill: Capillary refill takes less than 2 seconds. Coloration: Skin is not pale. Findings: No rash. Neurological:      General: No focal deficit present. Mental Status: She is alert and oriented to person, place, and time. Deep Tendon Reflexes: Reflexes are normal and symmetric.    Psychiatric: Attention and Perception: Attention normal.         Mood and Affect: Mood normal.         Behavior: Behavior normal. Behavior is cooperative. Thought Content: Thought content normal.       /70   Pulse 85   Temp 98 °F (36.7 °C)   Wt 228 lb (103.4 kg)   SpO2 98%   BMI 37.94 kg/m²     Assessment         Diagnosis Orders   1. Acute bronchitis, unspecified organism  XR CHEST (2 VW)      2. Shortness of breath  XR CHEST (2 VW)      3. Decreased lung sounds  XR CHEST (2 VW)          Plan   - Chest xray today; will call with results  - Take steroids as prescribed by PCP beginning tomorrow  - Use albuterol as needed for wheezing  - Dexamethasone injection today in office  - Continue OTC mucinex  - If SOA worsens, go to the ER.   - The patient is to follow up with PCP or return to clinic if symptoms worsen/fail to improve. Orders Placed This Encounter   Procedures    XR CHEST (2 VW)     Standing Status:   Future     Number of Occurrences:   1     Standing Expiration Date:   11/17/2023     Narrative   CLINICAL HISTORY: Acute bronchitis   TECHNIQUE: 2 views of the chest   COMPARISON: Chest CT from 11/15/2021   FINDINGS:   Lines and tubes: None. Cardiomediastinal: Normal size and configuration of the cardiomediastinal   silhouette. No pneumomediastinum. Lungs and pleura: The lungs are grossly unremarkable. No pleural effusion. No   pneumothorax. Musculoskeletal: No acute osseous abnormalities. Other: None. Impression   No acute cardiopulmonary process identified. Orders Placed This Encounter   Medications    dexamethasone (DECADRON) injection 10 mg      Return if symptoms worsen or fail to improve. Discussed use, benefits, and side effects of any prescribed medications. All patient questions were answered. Patient voiced understanding of care plan. Patient was given educational materials - see patient instructions below.      Patient Instructions   - Chest xray today; will call with

## 2022-12-02 DIAGNOSIS — K22.70 BARRETT'S ESOPHAGUS DETERMINED BY ENDOSCOPY: ICD-10-CM

## 2022-12-02 DIAGNOSIS — R12 CHRONIC HEARTBURN: ICD-10-CM

## 2022-12-05 RX ORDER — ESOMEPRAZOLE MAGNESIUM 40 MG/1
CAPSULE, DELAYED RELEASE ORAL
Qty: 180 CAPSULE | Refills: 0 | Status: SHIPPED | OUTPATIENT
Start: 2022-12-05

## 2022-12-21 ENCOUNTER — OFFICE VISIT (OUTPATIENT)
Dept: FAMILY MEDICINE CLINIC | Age: 57
End: 2022-12-21
Payer: COMMERCIAL

## 2022-12-21 VITALS
BODY MASS INDEX: 38.27 KG/M2 | TEMPERATURE: 97.7 F | OXYGEN SATURATION: 98 % | DIASTOLIC BLOOD PRESSURE: 80 MMHG | HEART RATE: 75 BPM | SYSTOLIC BLOOD PRESSURE: 132 MMHG | WEIGHT: 230 LBS

## 2022-12-21 DIAGNOSIS — N30.00 ACUTE CYSTITIS WITHOUT HEMATURIA: Primary | ICD-10-CM

## 2022-12-21 DIAGNOSIS — N30.00 ACUTE CYSTITIS WITHOUT HEMATURIA: ICD-10-CM

## 2022-12-21 LAB
AMORPHOUS: ABNORMAL /HPF
BACTERIA: ABNORMAL /HPF
BILIRUBIN URINE: NEGATIVE
BLOOD, URINE: ABNORMAL
CLARITY: ABNORMAL
COLOR: YELLOW
CRYSTALS, UA: ABNORMAL /HPF
EPITHELIAL CELLS, UA: ABNORMAL /HPF
GLUCOSE URINE: NEGATIVE MG/DL
HYALINE CASTS: ABNORMAL /LPF (ref 0–5)
KETONES, URINE: NEGATIVE MG/DL
LEUKOCYTE ESTERASE, URINE: ABNORMAL
NITRITE, URINE: POSITIVE
PH UA: 5.5 (ref 5–8)
PROTEIN UA: ABNORMAL MG/DL
RBC UA: ABNORMAL /HPF (ref 0–2)
SPECIFIC GRAVITY UA: >=1.03 (ref 1–1.03)
URINE TYPE: ABNORMAL
UROBILINOGEN, URINE: 0.2 E.U./DL
WBC UA: ABNORMAL /HPF (ref 0–5)
YEAST: PRESENT /HPF

## 2022-12-21 PROCEDURE — 99213 OFFICE O/P EST LOW 20 MIN: CPT | Performed by: FAMILY MEDICINE

## 2022-12-21 RX ORDER — LEVOFLOXACIN 500 MG/1
500 TABLET, FILM COATED ORAL DAILY
Qty: 5 TABLET | Refills: 0 | Status: SHIPPED | OUTPATIENT
Start: 2022-12-21 | End: 2022-12-26

## 2022-12-21 ASSESSMENT — ENCOUNTER SYMPTOMS
RESPIRATORY NEGATIVE: 1
GASTROINTESTINAL NEGATIVE: 1
EYES NEGATIVE: 1
ALLERGIC/IMMUNOLOGIC NEGATIVE: 1

## 2022-12-21 NOTE — PROGRESS NOTES
SUBJECTIVE:    Patient ID: Nereida Valdovinos is a 56 y. o.female. HPI:   Patient here for evaluation of a UTI  Patient is a 77-year-old female patient complains of frequency urgency and pelvic pressure. Some back pain but she have chronic back issues. No nausea no vomiting no diarrhea. No fevers.        Past Medical History:   Diagnosis Date    Anxiety and depression 11/2/2021    Pereyra esophagus     Chest pressure 10/22/2017    10/14/2014  SE Positive for clinical myocardial ischemia 10/14/2017  Cath  Moderate diffuse disease in mid and distal LAD, normal LVFX     GERD (gastroesophageal reflux disease)     Hyperlipidemia 11/2/2021    Hypertension     Obesity     Sleep apnea     sleeps with c pap      Current Outpatient Medications   Medication Sig Dispense Refill    levoFLOXacin (LEVAQUIN) 500 MG tablet Take 1 tablet by mouth daily for 5 days 5 tablet 0    esomeprazole (NEXIUM) 40 MG delayed release capsule TAKE 1 CAPSULE BY MOUTH TWICE A  capsule 0    albuterol sulfate HFA (PROVENTIL HFA) 108 (90 Base) MCG/ACT inhaler Inhale 2 puffs into the lungs every 6 hours as needed for Wheezing 1 each 5    butalbital-acetaminophen-caffeine (FIORICET, ESGIC) -40 MG per tablet Take 1 tablet by mouth every 6 hours as needed for Headaches 60 tablet 0    tiZANidine (ZANAFLEX) 4 MG tablet Take 1 tablet by mouth 3 times daily as needed (muscle pain) 60 tablet 1    ondansetron (ZOFRAN ODT) 8 MG TBDP disintegrating tablet Place 1 tablet under the tongue every 8 hours as needed for Nausea or Vomiting 30 tablet 1    nystatin (MYCOSTATIN) 882260 UNIT/GM powder APPLY 3 TIMES DAILY TO ABDOMINAL AREA 60 g 1    amLODIPine (NORVASC) 5 MG tablet Take 1 tablet by mouth daily 30 tablet 0    montelukast (SINGULAIR) 10 MG tablet TAKE 1 TABLET BY MOUTH EVERY DAY 90 tablet 1    ondansetron (ZOFRAN ODT) 4 MG disintegrating tablet Take 1 tablet by mouth every 8 hours as needed for Nausea or Vomiting 15 tablet 0    escitalopram (LEXAPRO) 10 MG tablet TAKE 1 TABLET BY MOUTH EVERY DAY FOR ANXIETY NEVER ABRUPTLY STOP 90 tablet 3    DULERA 200-5 MCG/ACT inhaler INHALE 2 PUFFS INTO THE LUNGS EVERY 12 HOURS. 13 each 2    vitamin D (ERGOCALCIFEROL) 1.25 MG (68715 UT) CAPS capsule TAKE 1 CAPSULE BY MOUTH ONE TIME PER WEEK 12 capsule 0    atorvastatin (LIPITOR) 20 MG tablet 1/2 po nightly for cholesterol x 1wk then increase to 1 po nightly 90 tablet 1    metFORMIN (GLUCOPHAGE) 500 MG tablet Take 1 tablet by mouth 2 times daily (with meals) 180 tablet 1    Simethicone (MYLANTA GAS PO) Take by mouth as needed (for acid reflux)      busPIRone (BUSPAR) 10 MG tablet 1/2 - 1 tablet bid prn anxiety 180 tablet 1    ipratropium-albuterol (DUONEB) 0.5-2.5 (3) MG/3ML SOLN nebulizer solution INHALE 3MLS VIA NEBULIZER 4 TIMES DAILY AS NEEDED      Respiratory Therapy Supplies DANA Renew cpap and supplies for continuation of HS use. 1 Device 0    CPAP Machine MISC by Does not apply route 1 each 0    Semaglutide,0.25 or 0.5MG/DOS, (OZEMPIC, 0.25 OR 0.5 MG/DOSE,) 2 MG/1.5ML SOPN Inject 0.5 mg into the skin once a week (Patient not taking: Reported on 12/21/2022) 1.5 mL 5     Current Facility-Administered Medications   Medication Dose Route Frequency Provider Last Rate Last Admin    ondansetron (ZOFRAN) injection 4 mg  4 mg IntraMUSCular Q6H PRN MICHELE Slaughter - CNP   4 mg at 02/24/22 1428      Allergies   Allergen Reactions    Crestor [Rosuvastatin]      Leg cramps    Morphine Hives     IV Morphine    Penicillins Rash     As a child       Review of Systems   Constitutional: Negative. HENT: Negative. Eyes: Negative. Respiratory: Negative. Cardiovascular: Negative. Gastrointestinal: Negative. Endocrine: Negative. Genitourinary:  Positive for frequency and urgency. Musculoskeletal: Negative. Skin: Negative. Allergic/Immunologic: Negative. Neurological: Negative. Hematological: Negative. Psychiatric/Behavioral: Negative. OBJECTIVE:    Physical Exam  Constitutional:       Appearance: Normal appearance. She is well-developed and well-groomed. She is obese. HENT:      Head: Normocephalic and atraumatic. Right Ear: Tympanic membrane, ear canal and external ear normal. There is no impacted cerumen. Left Ear: Tympanic membrane, ear canal and external ear normal. There is no impacted cerumen. Nose: Nose normal.      Mouth/Throat:      Lips: Pink. Mouth: Mucous membranes are moist.      Dentition: Normal dentition. Pharynx: Oropharynx is clear. Uvula midline. Eyes:      General: Lids are normal.         Right eye: No discharge. Left eye: No discharge. Extraocular Movements: Extraocular movements intact. Conjunctiva/sclera: Conjunctivae normal.      Right eye: Right conjunctiva is not injected. Left eye: Left conjunctiva is not injected. Pupils: Pupils are equal, round, and reactive to light. Neck:      Thyroid: No thyromegaly. Vascular: No carotid bruit or JVD. Cardiovascular:      Rate and Rhythm: Normal rate and regular rhythm. Pulses: Normal pulses. Carotid pulses are 2+ on the right side and 2+ on the left side. Radial pulses are 2+ on the right side and 2+ on the left side. Heart sounds: Normal heart sounds, S1 normal and S2 normal. No murmur heard. Pulmonary:      Effort: Pulmonary effort is normal.      Breath sounds: Normal breath sounds. Abdominal:      General: Bowel sounds are normal. There is no distension or abdominal bruit. Palpations: Abdomen is soft. There is no mass. Hernia: No hernia is present. Musculoskeletal:         General: Normal range of motion. Cervical back: Full passive range of motion without pain, normal range of motion and neck supple. Right lower leg: No edema. Left lower leg: No edema. Lymphadenopathy:      Cervical: No cervical adenopathy.       Right cervical: No superficial cervical adenopathy. Left cervical: No superficial cervical adenopathy. Upper Body:      Right upper body: No supraclavicular adenopathy. Left upper body: No supraclavicular adenopathy. Skin:     General: Skin is warm and dry. Coloration: Skin is not pale. Findings: No lesion or rash. Nails: There is no clubbing. Neurological:      Mental Status: She is alert and oriented to person, place, and time. Motor: No weakness or tremor. Coordination: Coordination normal.      Deep Tendon Reflexes: Reflexes are normal and symmetric. Psychiatric:         Attention and Perception: Attention normal.         Mood and Affect: Mood normal.         Speech: Speech normal.         Behavior: Behavior normal. Behavior is cooperative. Thought Content: Thought content normal.         Cognition and Memory: Cognition and memory normal.         Judgment: Judgment normal.      /80 (Site: Left Upper Arm, Position: Sitting, Cuff Size: Medium Adult)   Pulse 75   Temp 97.7 °F (36.5 °C) (Temporal)   Wt 230 lb (104.3 kg)   SpO2 98%   BMI 38.27 kg/m²      ASSESSMENT:     Diagnosis Orders   1. Acute cystitis without hematuria  Culture, Urine    Urinalysis with Reflex to Culture    levoFLOXacin (LEVAQUIN) 500 MG tablet           PLAN:    1. Urine with reflex to culture. And will start empirically on Levaquin.

## 2022-12-23 LAB
ORGANISM: ABNORMAL
URINE CULTURE, ROUTINE: ABNORMAL
URINE CULTURE, ROUTINE: ABNORMAL

## 2022-12-27 ENCOUNTER — PATIENT MESSAGE (OUTPATIENT)
Dept: FAMILY MEDICINE CLINIC | Age: 57
End: 2022-12-27

## 2022-12-28 RX ORDER — SULFAMETHOXAZOLE AND TRIMETHOPRIM 800; 160 MG/1; MG/1
1 TABLET ORAL 2 TIMES DAILY
Qty: 14 TABLET | Refills: 0 | Status: SHIPPED | OUTPATIENT
Start: 2022-12-28 | End: 2023-01-04

## 2023-01-21 DIAGNOSIS — M54.50 CHRONIC LOW BACK PAIN, UNSPECIFIED BACK PAIN LATERALITY, UNSPECIFIED WHETHER SCIATICA PRESENT: ICD-10-CM

## 2023-01-21 DIAGNOSIS — G89.29 CHRONIC LOW BACK PAIN, UNSPECIFIED BACK PAIN LATERALITY, UNSPECIFIED WHETHER SCIATICA PRESENT: ICD-10-CM

## 2023-01-23 RX ORDER — TIZANIDINE 4 MG/1
TABLET ORAL
Qty: 60 TABLET | Refills: 3 | Status: SHIPPED | OUTPATIENT
Start: 2023-01-23

## 2023-01-26 ENCOUNTER — PATIENT MESSAGE (OUTPATIENT)
Dept: FAMILY MEDICINE CLINIC | Age: 58
End: 2023-01-26

## 2023-01-26 NOTE — TELEPHONE ENCOUNTER
From: Shawn Da Silva  To: Dr. Lady Abreu: 1/26/2023 10:25 AM CST  Subject: Medications    Good Morning! I had a refill on my Ozempic at [facility] and they just receive the med back in stock, but when I tried to refill they said it needed another PA! Can I please change the Ozempic to Malvin Juan heard people are having better results with less side effects. My current weight is 227 and 55 with BMI- 37.8  I do have dx of insulin resistance and Pre DM. I think the Cole  will work better. If I need to come in I can. Thank you!

## 2023-01-30 DIAGNOSIS — I10 HYPERTENSION, UNSPECIFIED TYPE: Primary | ICD-10-CM

## 2023-01-30 DIAGNOSIS — E88.81 INSULIN RESISTANCE: ICD-10-CM

## 2023-02-06 DIAGNOSIS — I10 HYPERTENSION, UNSPECIFIED TYPE: ICD-10-CM

## 2023-02-06 DIAGNOSIS — E88.81 INSULIN RESISTANCE: ICD-10-CM

## 2023-02-06 DIAGNOSIS — E88.81 INSULIN RESISTANCE: Primary | ICD-10-CM

## 2023-02-06 LAB
ALBUMIN SERPL-MCNC: 4 G/DL (ref 3.5–5.2)
ALP BLD-CCNC: 77 U/L (ref 35–104)
ALT SERPL-CCNC: 41 U/L (ref 5–33)
ANION GAP SERPL CALCULATED.3IONS-SCNC: 12 MMOL/L (ref 7–19)
AST SERPL-CCNC: 27 U/L (ref 5–32)
BILIRUB SERPL-MCNC: <0.2 MG/DL (ref 0.2–1.2)
BILIRUBIN URINE: NEGATIVE
BLOOD, URINE: NEGATIVE
BUN BLDV-MCNC: 14 MG/DL (ref 6–20)
CALCIUM SERPL-MCNC: 9.2 MG/DL (ref 8.6–10)
CHLORIDE BLD-SCNC: 106 MMOL/L (ref 98–111)
CHOLESTEROL, TOTAL: 251 MG/DL (ref 160–199)
CLARITY: CLEAR
CO2: 22 MMOL/L (ref 22–29)
COLOR: YELLOW
CREAT SERPL-MCNC: 0.8 MG/DL (ref 0.5–0.9)
GFR SERPL CREATININE-BSD FRML MDRD: >60 ML/MIN/{1.73_M2}
GLUCOSE BLD-MCNC: 98 MG/DL (ref 74–109)
GLUCOSE URINE: NEGATIVE MG/DL
HBA1C MFR BLD: 5.8 % (ref 4–6)
HCT VFR BLD CALC: 38.6 % (ref 37–47)
HDLC SERPL-MCNC: 56 MG/DL (ref 65–121)
HEMOGLOBIN: 12.3 G/DL (ref 12–16)
KETONES, URINE: NEGATIVE MG/DL
LDL CHOLESTEROL CALCULATED: 153 MG/DL
LEUKOCYTE ESTERASE, URINE: NEGATIVE
MCH RBC QN AUTO: 30.9 PG (ref 27–31)
MCHC RBC AUTO-ENTMCNC: 31.9 G/DL (ref 33–37)
MCV RBC AUTO: 97 FL (ref 81–99)
NITRITE, URINE: NEGATIVE
PDW BLD-RTO: 13.5 % (ref 11.5–14.5)
PH UA: 5.5 (ref 5–8)
PLATELET # BLD: 300 K/UL (ref 130–400)
PMV BLD AUTO: 9.9 FL (ref 9.4–12.3)
POTASSIUM SERPL-SCNC: 4.1 MMOL/L (ref 3.5–5)
PROTEIN UA: NEGATIVE MG/DL
RBC # BLD: 3.98 M/UL (ref 4.2–5.4)
SEDIMENTATION RATE, ERYTHROCYTE: 10 MM/HR (ref 0–25)
SODIUM BLD-SCNC: 140 MMOL/L (ref 136–145)
SPECIFIC GRAVITY UA: >=1.03 (ref 1–1.03)
TOTAL PROTEIN: 6.8 G/DL (ref 6.6–8.7)
TRIGL SERPL-MCNC: 210 MG/DL (ref 0–149)
TSH SERPL DL<=0.05 MIU/L-ACNC: 2.32 UIU/ML (ref 0.27–4.2)
URINE TYPE: NORMAL
UROBILINOGEN, URINE: 0.2 E.U./DL
WBC # BLD: 5.1 K/UL (ref 4.8–10.8)

## 2023-02-08 ENCOUNTER — OFFICE VISIT (OUTPATIENT)
Dept: FAMILY MEDICINE CLINIC | Age: 58
End: 2023-02-08
Payer: COMMERCIAL

## 2023-02-08 ENCOUNTER — HOSPITAL ENCOUNTER (OUTPATIENT)
Dept: GENERAL RADIOLOGY | Age: 58
Discharge: HOME | End: 2023-02-08
Payer: COMMERCIAL

## 2023-02-08 VITALS
OXYGEN SATURATION: 98 % | TEMPERATURE: 97.1 F | SYSTOLIC BLOOD PRESSURE: 130 MMHG | WEIGHT: 229 LBS | HEIGHT: 65 IN | BODY MASS INDEX: 38.15 KG/M2 | HEART RATE: 62 BPM | DIASTOLIC BLOOD PRESSURE: 76 MMHG

## 2023-02-08 DIAGNOSIS — M54.50 CHRONIC MIDLINE LOW BACK PAIN WITHOUT SCIATICA: ICD-10-CM

## 2023-02-08 DIAGNOSIS — G89.29 CHRONIC MIDLINE LOW BACK PAIN WITHOUT SCIATICA: ICD-10-CM

## 2023-02-08 DIAGNOSIS — J98.4 RESTRICTIVE AIRWAY DISEASE: ICD-10-CM

## 2023-02-08 DIAGNOSIS — E66.09 CLASS 2 OBESITY DUE TO EXCESS CALORIES WITHOUT SERIOUS COMORBIDITY WITH BODY MASS INDEX (BMI) OF 38.0 TO 38.9 IN ADULT: ICD-10-CM

## 2023-02-08 DIAGNOSIS — E88.81 METABOLIC SYNDROME: ICD-10-CM

## 2023-02-08 DIAGNOSIS — M25.50 MULTIPLE JOINT PAIN: Primary | ICD-10-CM

## 2023-02-08 DIAGNOSIS — R12 CHRONIC HEARTBURN: ICD-10-CM

## 2023-02-08 DIAGNOSIS — F32.A DEPRESSION, UNSPECIFIED DEPRESSION TYPE: ICD-10-CM

## 2023-02-08 DIAGNOSIS — K22.70 BARRETT'S ESOPHAGUS DETERMINED BY ENDOSCOPY: ICD-10-CM

## 2023-02-08 DIAGNOSIS — G44.229 CHRONIC TENSION-TYPE HEADACHE, NOT INTRACTABLE: ICD-10-CM

## 2023-02-08 LAB
INSULIN FREE: 10 UIU/ML (ref 3–25)
INSULIN: 27 UIU/ML (ref 3–25)

## 2023-02-08 PROCEDURE — 72100 X-RAY EXAM L-S SPINE 2/3 VWS: CPT

## 2023-02-08 PROCEDURE — 99214 OFFICE O/P EST MOD 30 MIN: CPT | Performed by: FAMILY MEDICINE

## 2023-02-08 RX ORDER — ESOMEPRAZOLE MAGNESIUM 40 MG/1
CAPSULE, DELAYED RELEASE ORAL
Qty: 180 CAPSULE | Refills: 3 | Status: SHIPPED | OUTPATIENT
Start: 2023-02-08

## 2023-02-08 RX ORDER — ONDANSETRON 8 MG/1
8 TABLET, ORALLY DISINTEGRATING ORAL EVERY 8 HOURS PRN
Qty: 30 TABLET | Refills: 1 | Status: SHIPPED | OUTPATIENT
Start: 2023-02-08

## 2023-02-08 RX ORDER — MELOXICAM 15 MG/1
15 TABLET ORAL DAILY
Qty: 30 TABLET | Refills: 3 | Status: SHIPPED | OUTPATIENT
Start: 2023-02-08

## 2023-02-08 RX ORDER — ALBUTEROL SULFATE 90 UG/1
2 AEROSOL, METERED RESPIRATORY (INHALATION) EVERY 6 HOURS PRN
Qty: 1 EACH | Refills: 5 | Status: SHIPPED | OUTPATIENT
Start: 2023-02-08

## 2023-02-08 RX ORDER — BUTALBITAL, ACETAMINOPHEN AND CAFFEINE 50; 325; 40 MG/1; MG/1; MG/1
1 TABLET ORAL EVERY 6 HOURS PRN
Qty: 60 TABLET | Refills: 0 | Status: SHIPPED | OUTPATIENT
Start: 2023-02-08

## 2023-02-08 RX ORDER — ESCITALOPRAM OXALATE 20 MG/1
20 TABLET ORAL DAILY
Qty: 90 TABLET | Refills: 3 | Status: SHIPPED | OUTPATIENT
Start: 2023-02-08

## 2023-02-08 ASSESSMENT — PATIENT HEALTH QUESTIONNAIRE - PHQ9
6. FEELING BAD ABOUT YOURSELF - OR THAT YOU ARE A FAILURE OR HAVE LET YOURSELF OR YOUR FAMILY DOWN: 0
5. POOR APPETITE OR OVEREATING: 0
8. MOVING OR SPEAKING SO SLOWLY THAT OTHER PEOPLE COULD HAVE NOTICED. OR THE OPPOSITE, BEING SO FIGETY OR RESTLESS THAT YOU HAVE BEEN MOVING AROUND A LOT MORE THAN USUAL: 0
SUM OF ALL RESPONSES TO PHQ QUESTIONS 1-9: 0
2. FEELING DOWN, DEPRESSED OR HOPELESS: 0
1. LITTLE INTEREST OR PLEASURE IN DOING THINGS: 0
SUM OF ALL RESPONSES TO PHQ9 QUESTIONS 1 & 2: 0
3. TROUBLE FALLING OR STAYING ASLEEP: 0
10. IF YOU CHECKED OFF ANY PROBLEMS, HOW DIFFICULT HAVE THESE PROBLEMS MADE IT FOR YOU TO DO YOUR WORK, TAKE CARE OF THINGS AT HOME, OR GET ALONG WITH OTHER PEOPLE: 0
SUM OF ALL RESPONSES TO PHQ QUESTIONS 1-9: 0
9. THOUGHTS THAT YOU WOULD BE BETTER OFF DEAD, OR OF HURTING YOURSELF: 0
SUM OF ALL RESPONSES TO PHQ QUESTIONS 1-9: 0
4. FEELING TIRED OR HAVING LITTLE ENERGY: 0
7. TROUBLE CONCENTRATING ON THINGS, SUCH AS READING THE NEWSPAPER OR WATCHING TELEVISION: 0
SUM OF ALL RESPONSES TO PHQ QUESTIONS 1-9: 0

## 2023-02-08 ASSESSMENT — ENCOUNTER SYMPTOMS
ALLERGIC/IMMUNOLOGIC NEGATIVE: 1
RESPIRATORY NEGATIVE: 1
GASTROINTESTINAL NEGATIVE: 1
EYES NEGATIVE: 1

## 2023-02-08 NOTE — PROGRESS NOTES
SUBJECTIVE:    Patient ID: Leighann Walls is a 66 y. o.female. HPI:   Patient here for follow-up of multiple medical problems  Patient is 62-year-old female. She has chronic joint pain and back pain. She been having multiple joint pain that is progressively getting worse. She has not taken anti-inflammatories. Her back pain is localized to her mid back. Seems to be worse with prolonged standing. Denies any sciatica. She also is morbid obese. She has been trying to lose weight by calorie reduction. She is also exercising. This approach have not helped reduce her weight. She would like to have some help. She have glucose intolerance metabolic syndrome. She takes metformin. She is also on statin therapy. She had recent blood work. A1c is well controlled. Cholesterol is elevated but improved. She also had depression. She been taking Lexapro 10 mg. She feels depressed. She has anhedonia. She does not feel like doing anything. During the weekend she just wants to sleep. She feels tired frequently. She also had chronic acid reflux. She have history of Pereyra's. She takes Nexium. Medication is helpful. No dysphagia. She also have tension headaches. She takes Fioricet as needed. Medication is very helpful. He has medication side effect. She does not take very often. Patient also have restrictive e airway disease. Use albuterol as needed p.o. medication is helpful.        Past Medical History:   Diagnosis Date    Anxiety and depression 11/2/2021    Pereyra esophagus     Chest pressure 10/22/2017    10/14/2014  SE Positive for clinical myocardial ischemia 10/14/2017  Cath  Moderate diffuse disease in mid and distal LAD, normal LVFX     GERD (gastroesophageal reflux disease)     Hyperlipidemia 11/2/2021    Hypertension     Obesity     Sleep apnea     sleeps with c pap      Current Outpatient Medications   Medication Sig Dispense Refill    esomeprazole (NEXIUM) 40 MG delayed release capsule TAKE 1 CAPSULE BY MOUTH TWICE A  capsule 3    albuterol sulfate HFA (PROVENTIL HFA) 108 (90 Base) MCG/ACT inhaler Inhale 2 puffs into the lungs every 6 hours as needed for Wheezing 1 each 5    butalbital-acetaminophen-caffeine (FIORICET, ESGIC) -40 MG per tablet Take 1 tablet by mouth every 6 hours as needed for Headaches 60 tablet 0    ondansetron (ZOFRAN ODT) 8 MG TBDP disintegrating tablet Place 1 tablet under the tongue every 8 hours as needed for Nausea or Vomiting 30 tablet 1    metFORMIN (GLUCOPHAGE) 500 MG tablet Take 1 tablet by mouth 2 times daily (with meals) 180 tablet 1    Semaglutide-Weight Management (WEGOVY) 0.25 MG/0.5ML SOAJ SC injection Inject 0.25 mg into the skin every 7 days 0.5 mL 5    meloxicam (MOBIC) 15 MG tablet Take 1 tablet by mouth daily 30 tablet 3    escitalopram (LEXAPRO) 20 MG tablet Take 1 tablet by mouth daily 90 tablet 3    tiZANidine (ZANAFLEX) 4 MG tablet TAKE ONE TABLET BY MOUTH THREE TIMES DAILY AS NEEDED 60 tablet 3    nystatin (MYCOSTATIN) 427482 UNIT/GM powder APPLY 3 TIMES DAILY TO ABDOMINAL AREA 60 g 1    ondansetron (ZOFRAN ODT) 4 MG disintegrating tablet Take 1 tablet by mouth every 8 hours as needed for Nausea or Vomiting 15 tablet 0    DULERA 200-5 MCG/ACT inhaler INHALE 2 PUFFS INTO THE LUNGS EVERY 12 HOURS. 13 each 2    vitamin D (ERGOCALCIFEROL) 1.25 MG (43378 UT) CAPS capsule TAKE 1 CAPSULE BY MOUTH ONE TIME PER WEEK 12 capsule 0    Simethicone (MYLANTA GAS PO) Take by mouth as needed (for acid reflux)      busPIRone (BUSPAR) 10 MG tablet 1/2 - 1 tablet bid prn anxiety 180 tablet 1    Respiratory Therapy Supplies DANA Renew cpap and supplies for continuation of HS use.  1 Device 0    CPAP Machine MISC by Does not apply route 1 each 0    amLODIPine (NORVASC) 5 MG tablet Take 1 tablet by mouth daily (Patient not taking: Reported on 2/8/2023) 30 tablet 0    montelukast (SINGULAIR) 10 MG tablet TAKE 1 TABLET BY MOUTH EVERY DAY (Patient not taking: Reported on 2/8/2023) 90 tablet 1    atorvastatin (LIPITOR) 20 MG tablet 1/2 po nightly for cholesterol x 1wk then increase to 1 po nightly (Patient not taking: Reported on 2/8/2023) 90 tablet 1     Current Facility-Administered Medications   Medication Dose Route Frequency Provider Last Rate Last Admin    ondansetron (ZOFRAN) injection 4 mg  4 mg IntraMUSCular Q6H PRN Alveria Schaumann, APRN - CNP   4 mg at 02/24/22 1428      Allergies   Allergen Reactions    Crestor [Rosuvastatin]      Leg cramps    Morphine Hives     IV Morphine    Penicillins Rash     As a child       Review of Systems   Constitutional: Negative. HENT: Negative. Eyes: Negative. Respiratory: Negative. Cardiovascular: Negative. Gastrointestinal: Negative. Endocrine: Negative. Genitourinary: Negative. Musculoskeletal: Negative. Skin: Negative. Allergic/Immunologic: Negative. Neurological: Negative. Hematological: Negative. Psychiatric/Behavioral: Negative. OBJECTIVE:    Physical Exam  Constitutional:       Appearance: Normal appearance. She is well-developed and well-groomed. She is obese. HENT:      Head: Normocephalic and atraumatic. Right Ear: Tympanic membrane, ear canal and external ear normal. There is no impacted cerumen. Left Ear: Tympanic membrane, ear canal and external ear normal. There is no impacted cerumen. Nose: Nose normal.      Mouth/Throat:      Lips: Pink. Mouth: Mucous membranes are moist.      Dentition: Normal dentition. Pharynx: Oropharynx is clear. Uvula midline. Eyes:      General: Lids are normal.         Right eye: No discharge. Left eye: No discharge. Extraocular Movements: Extraocular movements intact. Conjunctiva/sclera: Conjunctivae normal.      Right eye: Right conjunctiva is not injected. Left eye: Left conjunctiva is not injected. Pupils: Pupils are equal, round, and reactive to light.    Neck:      Thyroid: No thyromegaly. Vascular: No carotid bruit or JVD. Cardiovascular:      Rate and Rhythm: Normal rate and regular rhythm. Pulses: Normal pulses. Carotid pulses are 2+ on the right side and 2+ on the left side. Radial pulses are 2+ on the right side and 2+ on the left side. Heart sounds: Normal heart sounds, S1 normal and S2 normal. No murmur heard. Pulmonary:      Effort: Pulmonary effort is normal.      Breath sounds: Normal breath sounds. Abdominal:      General: Bowel sounds are normal. There is no distension or abdominal bruit. Palpations: Abdomen is soft. There is no mass. Hernia: No hernia is present. Musculoskeletal:         General: Normal range of motion. Cervical back: Full passive range of motion without pain, normal range of motion and neck supple. Right lower leg: No edema. Left lower leg: No edema. Lymphadenopathy:      Cervical: No cervical adenopathy. Right cervical: No superficial cervical adenopathy. Left cervical: No superficial cervical adenopathy. Upper Body:      Right upper body: No supraclavicular adenopathy. Left upper body: No supraclavicular adenopathy. Skin:     General: Skin is warm and dry. Coloration: Skin is not pale. Findings: No lesion or rash. Nails: There is no clubbing. Neurological:      Mental Status: She is alert and oriented to person, place, and time. Motor: No weakness or tremor. Coordination: Coordination normal.      Deep Tendon Reflexes: Reflexes are normal and symmetric. Psychiatric:         Attention and Perception: Attention normal.         Mood and Affect: Mood normal.         Speech: Speech normal.         Behavior: Behavior normal. Behavior is cooperative. Thought Content:  Thought content normal.         Cognition and Memory: Cognition and memory normal.         Judgment: Judgment normal.      /76 (Site: Left Upper Arm, Position: Sitting, Cuff Size: Medium Adult)   Pulse 62   Temp 97.1 °F (36.2 °C) (Temporal)   Ht 5' 5\" (1.651 m)   Wt 229 lb (103.9 kg)   SpO2 98%   BMI 38.11 kg/m²      ASSESSMENT:   Diagnosis Orders   1. Multiple joint pain-suspect osteoarthritis. meloxicam (MOBIC) 15 MG tablet    Rheumatoid Factor      2. Chronic midline low back pain without sciatica that is arthritic versus compression fractures. XR LUMBAR SPINE (2-3 VIEWS)      3. Pereyra's esophagus determined by endoscopy-stable esomeprazole (NEXIUM) 40 MG delayed release capsule      4. Class 2 obesity due to excess calories without serious comorbidity with body mass index (BMI) of 38.0 to 38.9 in adult-not controlled       5. Chronic heartburn  esomeprazole (NEXIUM) 40 MG delayed release capsule      6. Chronic tension-type headache, not intractable  butalbital-acetaminophen-caffeine (FIORICET, ESGIC) -40 MG per tablet      7. Metabolic syndrome  metFORMIN (GLUCOPHAGE) 500 MG tablet      8. Depression, unspecified depression type  escitalopram (LEXAPRO) 20 MG tablet      9. Restrictive airway dx - stable          PLAN:    1. Trial meloxicam.  Rheumatoid factor. 2.  X-ray lumbar spine  3./5.cont nexium  4.trial of wegovy in combination w diet and exercise   6.masha. refill med  7. refill med  8. increase lexapro to 20   9. refill med  Fu 1 mo

## 2023-03-05 DIAGNOSIS — F41.9 ANXIETY AND DEPRESSION: ICD-10-CM

## 2023-03-05 DIAGNOSIS — F32.A ANXIETY AND DEPRESSION: ICD-10-CM

## 2023-03-06 ENCOUNTER — PATIENT MESSAGE (OUTPATIENT)
Dept: FAMILY MEDICINE CLINIC | Age: 58
End: 2023-03-06

## 2023-03-06 DIAGNOSIS — R12 CHRONIC HEARTBURN: ICD-10-CM

## 2023-03-06 DIAGNOSIS — K22.70 BARRETT'S ESOPHAGUS DETERMINED BY ENDOSCOPY: ICD-10-CM

## 2023-03-06 DIAGNOSIS — G89.29 CHRONIC MIDLINE LOW BACK PAIN WITHOUT SCIATICA: Primary | ICD-10-CM

## 2023-03-06 DIAGNOSIS — M54.50 CHRONIC MIDLINE LOW BACK PAIN WITHOUT SCIATICA: Primary | ICD-10-CM

## 2023-03-06 DIAGNOSIS — E66.09 CLASS 2 OBESITY DUE TO EXCESS CALORIES WITHOUT SERIOUS COMORBIDITY WITH BODY MASS INDEX (BMI) OF 38.0 TO 38.9 IN ADULT: ICD-10-CM

## 2023-03-06 NOTE — TELEPHONE ENCOUNTER
Kalyn Valencia called to request a refill on her medication.       Last office visit : 2/8/2023   Next office visit : 3/6/2023     Requested Prescriptions     Pending Prescriptions Disp Refills    escitalopram (LEXAPRO) 10 MG tablet [Pharmacy Med Name: ESCITALOPRAM 10 MG TABLET] 90 tablet 3     Sig: TAKE 1 TABLET BY MOUTH EVERY DAY FOR ANXIETY NEVER ABRUPTLY STOP            Celesta Hallmark

## 2023-03-06 NOTE — TELEPHONE ENCOUNTER
Okay with [facility] JOEL EMMANUEL on her back probably physical therapy first and MRI if not better.   The main reason to do an MRI if to see if she will benefit from surgery

## 2023-03-06 NOTE — TELEPHONE ENCOUNTER
From: Anjelica Fox  To: Dr. Longoria Jair: 3/6/2023 8:17 AM CST  Subject: Back pain/ Wegovy    Good morning, I finished my last pen of Wegovy 0.25 last night. Can I have a new script called in and move up to 0.50 please. What is the next step for my low back pain? I know my plain films were negative, do I need a MRI next. I still hurts low in the middle if I stand too long, walk too much, or sit to long. Tried chiropractic services without any relief.

## 2023-03-06 NOTE — TELEPHONE ENCOUNTER
Frederic Landa called to request a refill on her medication.       Last office visit : 2/8/2023   Next office visit : Visit date not found     Requested Prescriptions     Pending Prescriptions Disp Refills    esomeprazole ([employer]) 40 MG delayed release capsule [Pharmacy Med Name: ESOMEPRAZOLE MAG DR 40 MG CAP] 180 capsule 3     Sig: TAKE 1 CAPSULE BY MOUTH TWICE A DAY            Bucky Carroll

## 2023-03-07 RX ORDER — ESOMEPRAZOLE MAGNESIUM 40 MG/1
CAPSULE, DELAYED RELEASE ORAL
Qty: 180 CAPSULE | Refills: 3 | Status: SHIPPED | OUTPATIENT
Start: 2023-03-07

## 2023-03-07 RX ORDER — ESCITALOPRAM OXALATE 10 MG/1
TABLET ORAL
Qty: 90 TABLET | Refills: 3 | Status: SHIPPED | OUTPATIENT
Start: 2023-03-07

## 2023-05-01 RX ORDER — SEMAGLUTIDE 0.5 MG/.5ML
INJECTION, SOLUTION SUBCUTANEOUS
Qty: 2 ML | Refills: 2 | Status: SHIPPED | OUTPATIENT
Start: 2023-05-01

## 2023-06-05 DIAGNOSIS — R12 CHRONIC HEARTBURN: ICD-10-CM

## 2023-06-05 DIAGNOSIS — K22.70 BARRETT'S ESOPHAGUS DETERMINED BY ENDOSCOPY: ICD-10-CM

## 2023-06-05 RX ORDER — ESOMEPRAZOLE MAGNESIUM 40 MG/1
40 CAPSULE, DELAYED RELEASE ORAL 2 TIMES DAILY
Qty: 180 CAPSULE | Refills: 3 | Status: SHIPPED | OUTPATIENT
Start: 2023-06-05

## 2023-06-27 ENCOUNTER — PATIENT MESSAGE (OUTPATIENT)
Dept: FAMILY MEDICINE CLINIC | Age: 58
End: 2023-06-27

## 2023-06-27 DIAGNOSIS — F32.A ANXIETY AND DEPRESSION: Primary | ICD-10-CM

## 2023-06-27 DIAGNOSIS — F41.9 ANXIETY AND DEPRESSION: Primary | ICD-10-CM

## 2023-06-27 RX ORDER — ALPRAZOLAM 0.25 MG/1
0.25 TABLET ORAL 2 TIMES DAILY PRN
Qty: 30 TABLET | Refills: 0 | Status: SHIPPED | OUTPATIENT
Start: 2023-06-27 | End: 2023-07-27

## 2023-07-19 DIAGNOSIS — F32.A ANXIETY AND DEPRESSION: ICD-10-CM

## 2023-07-19 DIAGNOSIS — F32.A DEPRESSION, UNSPECIFIED DEPRESSION TYPE: ICD-10-CM

## 2023-07-19 DIAGNOSIS — F41.9 ANXIETY AND DEPRESSION: ICD-10-CM

## 2023-07-19 RX ORDER — ESCITALOPRAM OXALATE 20 MG/1
20 TABLET ORAL DAILY
Qty: 90 TABLET | Refills: 3 | Status: SHIPPED | OUTPATIENT
Start: 2023-07-19

## 2023-07-19 RX ORDER — ALPRAZOLAM 0.25 MG/1
0.25 TABLET ORAL 2 TIMES DAILY PRN
Qty: 30 TABLET | Refills: 0 | Status: SHIPPED | OUTPATIENT
Start: 2023-07-19 | End: 2023-08-18

## 2023-08-18 ENCOUNTER — PATIENT MESSAGE (OUTPATIENT)
Dept: FAMILY MEDICINE CLINIC | Age: 58
End: 2023-08-18

## 2023-10-12 ENCOUNTER — OFFICE VISIT (OUTPATIENT)
Dept: FAMILY MEDICINE CLINIC | Age: 58
End: 2023-10-12
Payer: COMMERCIAL

## 2023-10-12 ENCOUNTER — HOSPITAL ENCOUNTER (OUTPATIENT)
Dept: GENERAL RADIOLOGY | Age: 58
Discharge: HOME | End: 2023-10-12
Payer: COMMERCIAL

## 2023-10-12 VITALS
HEART RATE: 78 BPM | HEIGHT: 65 IN | OXYGEN SATURATION: 98 % | TEMPERATURE: 97.7 F | BODY MASS INDEX: 38.57 KG/M2 | DIASTOLIC BLOOD PRESSURE: 87 MMHG | WEIGHT: 231.5 LBS | SYSTOLIC BLOOD PRESSURE: 137 MMHG

## 2023-10-12 DIAGNOSIS — M25.551 ACUTE PAIN OF RIGHT HIP: Primary | ICD-10-CM

## 2023-10-12 DIAGNOSIS — M25.551 ACUTE PAIN OF RIGHT HIP: ICD-10-CM

## 2023-10-12 PROCEDURE — 99213 OFFICE O/P EST LOW 20 MIN: CPT | Performed by: NURSE PRACTITIONER

## 2023-10-12 PROCEDURE — 73502 X-RAY EXAM HIP UNI 2-3 VIEWS: CPT

## 2023-10-12 RX ORDER — PREDNISONE 20 MG/1
20 TABLET ORAL 2 TIMES DAILY
Qty: 10 TABLET | Refills: 0 | Status: SHIPPED | OUTPATIENT
Start: 2023-10-12 | End: 2023-10-17

## 2023-10-12 RX ORDER — TIZANIDINE 4 MG/1
4 TABLET ORAL 3 TIMES DAILY PRN
Qty: 30 TABLET | Refills: 0 | Status: SHIPPED | OUTPATIENT
Start: 2023-10-12

## 2023-10-12 NOTE — PROGRESS NOTES
tablet by mouth daily Yes Carleen Hollingsworth MD   tiZANidine (ZANAFLEX) 4 MG tablet TAKE ONE TABLET BY MOUTH THREE TIMES DAILY AS NEEDED Yes Carleen Hollingsworth MD   nystatin (MYCOSTATIN) 361849 UNIT/GM powder APPLY 3 TIMES DAILY TO ABDOMINAL AREA Yes Carleen Hollingsworth MD   montelukast (SINGULAIR) 10 MG tablet TAKE 1 TABLET BY MOUTH EVERY DAY Yes Nahed Weiss APRN   ondansetron (ZOFRAN ODT) 4 MG disintegrating tablet Take 1 tablet by mouth every 8 hours as needed for Nausea or Vomiting Yes Hudgin, Majel Apgar, APRN - CNP   DULERA 200-5 MCG/ACT inhaler INHALE 2 PUFFS INTO THE LUNGS EVERY 12 HOURS. Yes Nahed Wesis APRN   vitamin D (ERGOCALCIFEROL) 1.25 MG (40445 UT) CAPS capsule TAKE 1 CAPSULE BY MOUTH ONE TIME PER WEEK Yes Nahed Weiss APRN   Simethicone (MYLANTA GAS PO) Take by mouth as needed (for acid reflux) Yes Provider, MD Tressa   Respiratory Therapy Supplies DANA Renew cpap and supplies for continuation of HS use. Yes Nahed Weiss APRN   CPAP Machine MISC by Does not apply route Yes MICHELE Todd   amLODIPine (NORVASC) 5 MG tablet Take 1 tablet by mouth daily  Patient not taking: Reported on 2023  Carleen Hollingsworth MD   atorvastatin (LIPITOR) 20 MG tablet 1/2 po nightly for cholesterol x 1wk then increase to 1 po nightly  Patient not taking: Reported on 2023  Nahed Weiss APRN      Allergies   Allergen Reactions    Crestor [Rosuvastatin]      Leg cramps    Morphine Hives     IV Morphine    Penicillins Rash     As a child       Review of Systems   Constitutional:  Negative for fever. Musculoskeletal:  Positive for gait problem and myalgias. OBJECTIVE:    Physical Exam  Constitutional:       Appearance: Normal appearance. She is well-developed. She is not ill-appearing. HENT:      Head: Normocephalic and atraumatic. Right Ear: External ear normal.      Left Ear: External ear normal.      Nose: Nose normal.      Mouth/Throat:      Lips: Pink.       Mouth: Mucous membranes are moist.   Eyes:

## 2023-10-20 ENCOUNTER — PATIENT MESSAGE (OUTPATIENT)
Dept: FAMILY MEDICINE CLINIC | Age: 58
End: 2023-10-20

## 2023-10-20 RX ORDER — BACLOFEN 10 MG/1
10 TABLET ORAL 3 TIMES DAILY
Qty: 30 TABLET | Refills: 0 | Status: SHIPPED | OUTPATIENT
Start: 2023-10-20 | End: 2023-10-30

## 2023-10-23 RX ORDER — BACLOFEN 10 MG/1
10 TABLET ORAL 3 TIMES DAILY
Qty: 90 TABLET | Refills: 0 | Status: SHIPPED | OUTPATIENT
Start: 2023-10-23

## 2023-11-29 DIAGNOSIS — F32.A DEPRESSION, UNSPECIFIED DEPRESSION TYPE: ICD-10-CM

## 2023-11-29 DIAGNOSIS — F41.9 ANXIETY AND DEPRESSION: ICD-10-CM

## 2023-11-29 DIAGNOSIS — F32.A ANXIETY AND DEPRESSION: ICD-10-CM

## 2023-11-29 DIAGNOSIS — F41.8 SITUATIONAL ANXIETY: ICD-10-CM

## 2023-11-29 DIAGNOSIS — K22.70 BARRETT'S ESOPHAGUS DETERMINED BY ENDOSCOPY: ICD-10-CM

## 2023-11-29 DIAGNOSIS — G44.229 CHRONIC TENSION-TYPE HEADACHE, NOT INTRACTABLE: ICD-10-CM

## 2023-11-29 DIAGNOSIS — R12 CHRONIC HEARTBURN: ICD-10-CM

## 2023-11-29 RX ORDER — BUTALBITAL, ACETAMINOPHEN AND CAFFEINE 50; 325; 40 MG/1; MG/1; MG/1
1 TABLET ORAL EVERY 6 HOURS PRN
Qty: 60 TABLET | Refills: 0 | Status: SHIPPED | OUTPATIENT
Start: 2023-11-29

## 2023-11-29 RX ORDER — ESCITALOPRAM OXALATE 20 MG/1
20 TABLET ORAL DAILY
Qty: 90 TABLET | Refills: 3 | Status: SHIPPED | OUTPATIENT
Start: 2023-11-29

## 2023-11-29 RX ORDER — BUSPIRONE HYDROCHLORIDE 10 MG/1
TABLET ORAL
Qty: 60 TABLET | Refills: 0 | Status: SHIPPED | OUTPATIENT
Start: 2023-11-29

## 2023-11-29 RX ORDER — BACLOFEN 10 MG/1
10 TABLET ORAL 3 TIMES DAILY
Qty: 90 TABLET | Refills: 0 | Status: SHIPPED | OUTPATIENT
Start: 2023-11-29

## 2023-11-29 RX ORDER — ESOMEPRAZOLE MAGNESIUM 40 MG/1
40 CAPSULE, DELAYED RELEASE ORAL 2 TIMES DAILY
Qty: 180 CAPSULE | Refills: 3 | Status: SHIPPED | OUTPATIENT
Start: 2023-11-29

## 2023-11-29 RX ORDER — ALPRAZOLAM 0.25 MG/1
0.25 TABLET ORAL 2 TIMES DAILY PRN
Qty: 30 TABLET | Refills: 0 | Status: SHIPPED | OUTPATIENT
Start: 2023-11-29 | End: 2023-12-29

## 2023-11-29 NOTE — TELEPHONE ENCOUNTER
Patient is requesting medication refills. Patient is going to schedule an appointment to come into the office.

## 2023-11-30 ENCOUNTER — HOSPITAL ENCOUNTER (OUTPATIENT)
Dept: GENERAL RADIOLOGY | Age: 58
Discharge: HOME | End: 2023-11-30
Payer: COMMERCIAL

## 2023-11-30 ENCOUNTER — OFFICE VISIT (OUTPATIENT)
Dept: FAMILY MEDICINE CLINIC | Age: 58
End: 2023-11-30
Payer: COMMERCIAL

## 2023-11-30 VITALS
HEIGHT: 65 IN | WEIGHT: 233 LBS | OXYGEN SATURATION: 97 % | BODY MASS INDEX: 38.82 KG/M2 | DIASTOLIC BLOOD PRESSURE: 74 MMHG | HEART RATE: 66 BPM | TEMPERATURE: 97.2 F | SYSTOLIC BLOOD PRESSURE: 130 MMHG

## 2023-11-30 DIAGNOSIS — J98.4 RESTRICTIVE AIRWAY DISEASE: ICD-10-CM

## 2023-11-30 DIAGNOSIS — J18.9 PNEUMONIA DUE TO INFECTIOUS ORGANISM, UNSPECIFIED LATERALITY, UNSPECIFIED PART OF LUNG: ICD-10-CM

## 2023-11-30 DIAGNOSIS — B37.2 SKIN YEAST INFECTION: ICD-10-CM

## 2023-11-30 DIAGNOSIS — J18.9 PNEUMONIA DUE TO INFECTIOUS ORGANISM, UNSPECIFIED LATERALITY, UNSPECIFIED PART OF LUNG: Primary | ICD-10-CM

## 2023-11-30 LAB
INFLUENZA A ANTIBODY: NORMAL
INFLUENZA B ANTIBODY: NORMAL
RSV ANTIGEN: NORMAL
SARS-COV-2 N GENE RESP QL NAA+PROBE: NOT DETECTED

## 2023-11-30 PROCEDURE — 99214 OFFICE O/P EST MOD 30 MIN: CPT | Performed by: FAMILY MEDICINE

## 2023-11-30 PROCEDURE — 71046 X-RAY EXAM CHEST 2 VIEWS: CPT

## 2023-11-30 RX ORDER — ALBUTEROL SULFATE 90 UG/1
2 AEROSOL, METERED RESPIRATORY (INHALATION) EVERY 6 HOURS PRN
Qty: 1 EACH | Refills: 5 | Status: SHIPPED | OUTPATIENT
Start: 2023-11-30

## 2023-11-30 RX ORDER — LEVOFLOXACIN 750 MG/1
750 TABLET, FILM COATED ORAL DAILY
Qty: 10 TABLET | Refills: 0 | Status: SHIPPED | OUTPATIENT
Start: 2023-11-30 | End: 2023-12-10

## 2023-11-30 RX ORDER — IPRATROPIUM BROMIDE AND ALBUTEROL SULFATE 2.5; .5 MG/3ML; MG/3ML
1 SOLUTION RESPIRATORY (INHALATION) EVERY 4 HOURS
Qty: 360 ML | Refills: 5 | Status: SHIPPED | OUTPATIENT
Start: 2023-11-30

## 2023-11-30 RX ORDER — NYSTATIN 100000 [USP'U]/G
POWDER TOPICAL
Qty: 60 G | Refills: 1 | Status: SHIPPED | OUTPATIENT
Start: 2023-11-30

## 2023-11-30 RX ORDER — PREDNISONE 20 MG/1
20 TABLET ORAL 2 TIMES DAILY
Qty: 8 TABLET | Refills: 0 | Status: SHIPPED | OUTPATIENT
Start: 2023-11-30 | End: 2023-12-04

## 2023-11-30 ASSESSMENT — ENCOUNTER SYMPTOMS
SHORTNESS OF BREATH: 1
ALLERGIC/IMMUNOLOGIC NEGATIVE: 1
COUGH: 1
EYES NEGATIVE: 1
CHEST TIGHTNESS: 1
WHEEZING: 1
GASTROINTESTINAL NEGATIVE: 1

## 2024-01-19 ENCOUNTER — OFFICE VISIT (OUTPATIENT)
Dept: PRIMARY CARE CLINIC | Age: 59
End: 2024-01-19
Payer: COMMERCIAL

## 2024-01-19 VITALS
HEART RATE: 79 BPM | DIASTOLIC BLOOD PRESSURE: 80 MMHG | RESPIRATION RATE: 18 BRPM | WEIGHT: 230 LBS | TEMPERATURE: 97 F | SYSTOLIC BLOOD PRESSURE: 130 MMHG | BODY MASS INDEX: 38.27 KG/M2 | OXYGEN SATURATION: 98 %

## 2024-01-19 DIAGNOSIS — R59.9 SWOLLEN LYMPH NODES: Primary | ICD-10-CM

## 2024-01-19 DIAGNOSIS — R05.8 OTHER COUGH: ICD-10-CM

## 2024-01-19 LAB — HETEROPHILE ANTIBODIES: NEGATIVE

## 2024-01-19 PROCEDURE — 96372 THER/PROPH/DIAG INJ SC/IM: CPT

## 2024-01-19 PROCEDURE — 99213 OFFICE O/P EST LOW 20 MIN: CPT

## 2024-01-19 RX ORDER — CEFDINIR 300 MG/1
300 CAPSULE ORAL 2 TIMES DAILY
Qty: 20 CAPSULE | Refills: 0 | Status: SHIPPED | OUTPATIENT
Start: 2024-01-19 | End: 2024-01-29

## 2024-01-19 RX ORDER — DEXAMETHASONE SODIUM PHOSPHATE 10 MG/ML
10 INJECTION INTRAMUSCULAR; INTRAVENOUS ONCE
Status: COMPLETED | OUTPATIENT
Start: 2024-01-19 | End: 2024-01-19

## 2024-01-19 RX ADMIN — DEXAMETHASONE SODIUM PHOSPHATE 10 MG: 10 INJECTION INTRAMUSCULAR; INTRAVENOUS at 16:41

## 2024-01-19 ASSESSMENT — ENCOUNTER SYMPTOMS
COUGH: 1
SINUS PAIN: 0
VOMITING: 0
SORE THROAT: 1
NAUSEA: 0
VOICE CHANGE: 1
DIARRHEA: 0
SINUS PRESSURE: 0
SHORTNESS OF BREATH: 0
RHINORRHEA: 0

## 2024-01-19 NOTE — PROGRESS NOTES
Medication was administered by Hiral Hutson LPN at 4:37 PM.    Medication: dexamethasone  Amount: 10mg  Route: intramuscular  Site: Dorsogluteal right    Patient tolerated well.  
Take 1 capsule by mouth 2 times daily for 10 days     Dispense:  20 capsule     Refill:  0      New Prescriptions    CEFDINIR (OMNICEF) 300 MG CAPSULE    Take 1 capsule by mouth 2 times daily for 10 days        Return if symptoms worsen or fail to improve.         Discussed use, benefits, and side effects of any prescribed medications. All patient questions were answered. Patient voiced understanding of care plan.   Patient was given educational materials - see patient instructions below.     Patient Instructions   Complete antibiotics as prescribed.  Follow up with Dr. Wiley if symptoms worsen or do not improve.      Electronically signed by MICHELE Baker CNP on 1/19/2024 at 4:40 PM

## 2024-01-19 NOTE — PATIENT INSTRUCTIONS
Complete antibiotics as prescribed.  Follow up with Dr. Wiley if symptoms worsen or do not improve.

## 2024-01-31 ENCOUNTER — PATIENT MESSAGE (OUTPATIENT)
Dept: FAMILY MEDICINE CLINIC | Age: 59
End: 2024-01-31

## 2024-01-31 DIAGNOSIS — I10 HYPERTENSION, UNSPECIFIED TYPE: ICD-10-CM

## 2024-01-31 RX ORDER — AMLODIPINE BESYLATE 5 MG/1
5 TABLET ORAL DAILY
Qty: 30 TABLET | Refills: 0 | Status: SHIPPED | OUTPATIENT
Start: 2024-01-31

## 2024-01-31 NOTE — TELEPHONE ENCOUNTER
From: Sara Armstrong  To: Dr. Christiano Wiley  Sent: 1/31/2024 1:21 PM CST  Subject: Blood pressure    Can I please get my Amlodipine refilled please. My pressure is running high again and I have started keeping a log so on my next visit I can show Dr. Wiley.

## 2024-01-31 NOTE — TELEPHONE ENCOUNTER
Sara Armstrong messaged to request a refill on her medication.      Last office visit : 11/30/2023   Next office visit : Visit date not found     Requested Prescriptions     Pending Prescriptions Disp Refills    amLODIPine (NORVASC) 5 MG tablet 30 tablet 0     Sig: Take 1 tablet by mouth daily            Erika Hare LPN

## 2024-02-13 ENCOUNTER — OFFICE VISIT (OUTPATIENT)
Dept: FAMILY MEDICINE CLINIC | Age: 59
End: 2024-02-13
Payer: COMMERCIAL

## 2024-02-13 VITALS
OXYGEN SATURATION: 97 % | SYSTOLIC BLOOD PRESSURE: 140 MMHG | WEIGHT: 231.06 LBS | HEART RATE: 98 BPM | TEMPERATURE: 96.9 F | BODY MASS INDEX: 40.94 KG/M2 | HEIGHT: 63 IN | DIASTOLIC BLOOD PRESSURE: 98 MMHG

## 2024-02-13 DIAGNOSIS — Z80.0 FAMILY HISTORY OF MALIGNANT NEOPLASM OF DIGESTIVE ORGAN: ICD-10-CM

## 2024-02-13 DIAGNOSIS — M25.50 ARTHRALGIA, UNSPECIFIED JOINT: ICD-10-CM

## 2024-02-13 DIAGNOSIS — Z80.41 FAMILY HISTORY OF MALIGNANT NEOPLASM OF OVARY: ICD-10-CM

## 2024-02-13 DIAGNOSIS — I10 BENIGN ESSENTIAL HTN: ICD-10-CM

## 2024-02-13 DIAGNOSIS — K22.719 BARRETT'S ESOPHAGUS WITH DYSPLASIA: ICD-10-CM

## 2024-02-13 DIAGNOSIS — F32.A ANXIETY AND DEPRESSION: ICD-10-CM

## 2024-02-13 DIAGNOSIS — E78.2 MIXED HYPERLIPIDEMIA: ICD-10-CM

## 2024-02-13 DIAGNOSIS — F41.9 ANXIETY AND DEPRESSION: ICD-10-CM

## 2024-02-13 DIAGNOSIS — R23.2 HOT FLASHES: ICD-10-CM

## 2024-02-13 DIAGNOSIS — R73.09 ELEVATED GLUCOSE: ICD-10-CM

## 2024-02-13 DIAGNOSIS — Z12.31 ENCOUNTER FOR SCREENING MAMMOGRAM FOR MALIGNANT NEOPLASM OF BREAST: ICD-10-CM

## 2024-02-13 DIAGNOSIS — E55.9 VITAMIN D DEFICIENCY: ICD-10-CM

## 2024-02-13 DIAGNOSIS — M25.50 ARTHRALGIA, UNSPECIFIED JOINT: Primary | ICD-10-CM

## 2024-02-13 LAB
25(OH)D3 SERPL-MCNC: 16.8 NG/ML
ALBUMIN SERPL-MCNC: 4.4 G/DL (ref 3.5–5.2)
ALP SERPL-CCNC: 90 U/L (ref 35–104)
ALT SERPL-CCNC: 24 U/L (ref 5–33)
ANION GAP SERPL CALCULATED.3IONS-SCNC: 14 MMOL/L (ref 7–19)
AST SERPL-CCNC: 17 U/L (ref 5–32)
BACTERIA URNS QL MICRO: NEGATIVE /HPF
BILIRUB SERPL-MCNC: 0.5 MG/DL (ref 0.2–1.2)
BILIRUB UR QL STRIP: NEGATIVE
BUN SERPL-MCNC: 17 MG/DL (ref 6–20)
CALCIUM SERPL-MCNC: 9.5 MG/DL (ref 8.6–10)
CHLORIDE SERPL-SCNC: 101 MMOL/L (ref 98–111)
CHOLEST SERPL-MCNC: 281 MG/DL (ref 160–199)
CLARITY UR: CLEAR
CO2 SERPL-SCNC: 25 MMOL/L (ref 22–29)
COLOR UR: YELLOW
CREAT SERPL-MCNC: 0.9 MG/DL (ref 0.5–0.9)
CRYSTALS URNS MICRO: ABNORMAL /HPF
EPI CELLS #/AREA URNS AUTO: 8 /HPF (ref 0–5)
ERYTHROCYTE [DISTWIDTH] IN BLOOD BY AUTOMATED COUNT: 13.7 % (ref 11.5–14.5)
ERYTHROCYTE [SEDIMENTATION RATE] IN BLOOD BY WESTERGREN METHOD: 15 MM/HR (ref 0–25)
ESTRADIOL SERPL-SCNC: <5 PG/ML
FSH SERPL-SCNC: 104 MIU/ML
GLUCOSE SERPL-MCNC: 90 MG/DL (ref 74–109)
GLUCOSE UR STRIP.AUTO-MCNC: NEGATIVE MG/DL
HBA1C MFR BLD: 5.6 % (ref 4–6)
HCT VFR BLD AUTO: 39.5 % (ref 37–47)
HDLC SERPL-MCNC: 78 MG/DL (ref 65–121)
HGB BLD-MCNC: 13.3 G/DL (ref 12–16)
HGB UR STRIP.AUTO-MCNC: ABNORMAL MG/L
HYALINE CASTS #/AREA URNS AUTO: 13 /HPF (ref 0–8)
KETONES UR STRIP.AUTO-MCNC: ABNORMAL MG/DL
LDLC SERPL CALC-MCNC: 165 MG/DL
LEUKOCYTE ESTERASE UR QL STRIP.AUTO: ABNORMAL
LH SERPL-ACNC: 49.5 MIU/ML
MCH RBC QN AUTO: 31.3 PG (ref 27–31)
MCHC RBC AUTO-ENTMCNC: 33.7 G/DL (ref 33–37)
MCV RBC AUTO: 92.9 FL (ref 81–99)
NITRITE UR QL STRIP.AUTO: NEGATIVE
PH UR STRIP.AUTO: 6 [PH] (ref 5–8)
PLATELET # BLD AUTO: 303 K/UL (ref 130–400)
PMV BLD AUTO: 9.7 FL (ref 9.4–12.3)
POTASSIUM SERPL-SCNC: 4.3 MMOL/L (ref 3.5–5)
PROGEST SERPL-MCNC: <0.05 NG/ML
PROT SERPL-MCNC: 7.5 G/DL (ref 6.6–8.7)
PROT UR STRIP.AUTO-MCNC: ABNORMAL MG/DL
RBC # BLD AUTO: 4.25 M/UL (ref 4.2–5.4)
RBC #/AREA URNS AUTO: 8 /HPF (ref 0–4)
RHEUMATOID FACT SER NEPH-ACNC: 10 IU/ML
SODIUM SERPL-SCNC: 140 MMOL/L (ref 136–145)
SP GR UR STRIP.AUTO: 1.02 (ref 1–1.03)
TRIGL SERPL-MCNC: 188 MG/DL (ref 0–149)
TSH SERPL DL<=0.005 MIU/L-ACNC: 1.67 UIU/ML (ref 0.27–4.2)
UROBILINOGEN UR STRIP.AUTO-MCNC: 0.2 E.U./DL
VIT B12 SERPL-MCNC: 423 PG/ML (ref 211–946)
WBC # BLD AUTO: 6.6 K/UL (ref 4.8–10.8)
WBC #/AREA URNS AUTO: 10 /HPF (ref 0–5)

## 2024-02-13 PROCEDURE — 3077F SYST BP >= 140 MM HG: CPT | Performed by: NURSE PRACTITIONER

## 2024-02-13 PROCEDURE — 3080F DIAST BP >= 90 MM HG: CPT | Performed by: NURSE PRACTITIONER

## 2024-02-13 PROCEDURE — 99214 OFFICE O/P EST MOD 30 MIN: CPT | Performed by: NURSE PRACTITIONER

## 2024-02-13 RX ORDER — PAROXETINE 10 MG/1
10 TABLET, FILM COATED ORAL DAILY
Qty: 30 TABLET | Refills: 3 | Status: SHIPPED | OUTPATIENT
Start: 2024-02-13

## 2024-02-13 RX ORDER — ERGOCALCIFEROL 1.25 MG/1
CAPSULE ORAL
Qty: 12 CAPSULE | Refills: 0 | Status: SHIPPED | OUTPATIENT
Start: 2024-02-13

## 2024-02-13 NOTE — PROGRESS NOTES
1     Standing Expiration Date:   2/13/2025    Urinalysis     Standing Status:   Future     Number of Occurrences:   1     Standing Expiration Date:   2/13/2025    Sedimentation Rate     Standing Status:   Future     Number of Occurrences:   1     Standing Expiration Date:   2/13/2025    HUSSAIN Screen with Reflex     Standing Status:   Future     Number of Occurrences:   1     Standing Expiration Date:   2/13/2025    Rheumatoid Factor     Standing Status:   Future     Number of Occurrences:   1     Standing Expiration Date:   2/13/2025    [facility] (2+79)     Family History of Cancer  Cancer Hx Info: lung cancer in mother, age at diagnosis uncertain, ovarian cancer in maternal grandmother, age at diagnosis uncertain, and pancreatic cancer in aunt, age at diagnosis uncertain.    Personal History of Cancer  Cancer Hx Info: no known personal history of cancer.    Breast Cancer Risk Model Information:  (For female patients without a personal history of breast cancer)    1. Patient height: 5ft 5inches  2. Patient weight: 231lbs  3. Has the patient had children? Yes; age of patient at birth of first child, uncertain years old.  4. Approximate age at first menstrual period? 15  5. Has the patient gone through menopause? Yes; menopause age, 30s years old.  6. Ashkenazi Hoahaoism descent? No  7. Has the patient ever used hormone replacement therapy? No  8. Number of female relatives: 3  9. Type of relatives:  sisters,  daughters, and  maternal aunts  10. Has the patient ever had a breast biopsy? No  11. Has the patient had breast density assessed by mammogram? No    ==========Department Information==========  ID: [id number]  Department:[facility]  [address] [address]  Dept: [phone]  Dept Fax: [phone]  Loc: [phone]     Order Specific Question:   What type of billing?     Answer:   Bill Insurance     Order Specific Question:   Patient and physician

## 2024-02-14 RX ORDER — ERGOCALCIFEROL 1.25 MG/1
50000 CAPSULE ORAL WEEKLY
Qty: 4 CAPSULE | Refills: 3 | Status: SHIPPED | OUTPATIENT
Start: 2024-02-14

## 2024-02-16 DIAGNOSIS — E78.5 HYPERLIPIDEMIA, UNSPECIFIED HYPERLIPIDEMIA TYPE: Primary | ICD-10-CM

## 2024-02-16 LAB — NUCLEAR IGG SER QL IA: NORMAL

## 2024-02-16 RX ORDER — EZETIMIBE 10 MG/1
10 TABLET ORAL DAILY
Qty: 30 TABLET | Refills: 3 | Status: SHIPPED | OUTPATIENT
Start: 2024-02-16

## 2024-02-16 RX ORDER — TIRZEPATIDE 2.5 MG/.5ML
2.5 INJECTION, SOLUTION SUBCUTANEOUS WEEKLY
Qty: 3 ML | Refills: 1 | Status: SHIPPED | OUTPATIENT
Start: 2024-02-16

## 2024-02-16 NOTE — TELEPHONE ENCOUNTER
----- Message from MICHELE Borden sent at 2/14/2024 11:04 AM CST -----  Please let patient know that labs have returned.  Lipid panel was very high with total being 281 and LDL being 165.  I would recommend starting Zetia 10 mg daily if patient is willing.  Will recheck lipid panel in 6 weeks.  Vitamin D level was too low.  I am sending in once weekly replacement for this.  This can cause worsening fatigue, joint pain, and difficulty with weight loss.  CBC was normal.  Hormone levels that have returned so far including FSH, LH, progesterone, and estrogen does show abnormal postmenopausal ranges.  This can be causing symptoms.  We can see if Paxil will help with symptoms at follow up visit.

## 2024-02-16 NOTE — TELEPHONE ENCOUNTER
Called patient, spoke with: Patient regarding the results of the patients most recent labs.  I advised Patient of Erica Montenegro recommendations.   Patient did voice understanding      Medication pending     Pt would like to know if we have heard from insurance about the mounjaro?

## 2024-02-16 NOTE — TELEPHONE ENCOUNTER
We did not send in the Mounjaro.  Her A1c did not qualify her.  We can try sending in Zepbound if she wishes

## 2024-02-18 LAB
DHEA SERPL-MCNC: 0.33 NG/ML (ref 0.63–4.7)
TESTOST SERPL-MCNC: 8 NG/DL (ref 9–55)

## 2024-02-26 ENCOUNTER — HOSPITAL ENCOUNTER (OUTPATIENT)
Dept: GENERAL RADIOLOGY | Age: 59
Discharge: HOME | End: 2024-02-26
Payer: COMMERCIAL

## 2024-02-26 ENCOUNTER — OFFICE VISIT (OUTPATIENT)
Dept: FAMILY MEDICINE CLINIC | Age: 59
End: 2024-02-26
Payer: COMMERCIAL

## 2024-02-26 VITALS
TEMPERATURE: 97.5 F | SYSTOLIC BLOOD PRESSURE: 132 MMHG | WEIGHT: 232 LBS | HEART RATE: 72 BPM | BODY MASS INDEX: 41.1 KG/M2 | DIASTOLIC BLOOD PRESSURE: 86 MMHG | OXYGEN SATURATION: 98 %

## 2024-02-26 DIAGNOSIS — M79.671 RIGHT FOOT PAIN: ICD-10-CM

## 2024-02-26 DIAGNOSIS — M79.671 RIGHT FOOT PAIN: Primary | ICD-10-CM

## 2024-02-26 PROCEDURE — 99213 OFFICE O/P EST LOW 20 MIN: CPT | Performed by: FAMILY MEDICINE

## 2024-02-26 PROCEDURE — 73630 X-RAY EXAM OF FOOT: CPT

## 2024-02-26 ASSESSMENT — ENCOUNTER SYMPTOMS
GASTROINTESTINAL NEGATIVE: 1
ALLERGIC/IMMUNOLOGIC NEGATIVE: 1
EYES NEGATIVE: 1
RESPIRATORY NEGATIVE: 1

## 2024-02-26 NOTE — PROGRESS NOTES
SUBJECTIVE:    Patient ID: Sara Armstrong is a 59 y.o.female.    HPI:   Patient here for evaluation of foot pain  Patient is a 59-year-old female.  She says she was laying down on the floor her  stopped in her right foot with with his working boots.  She have immediate pain and she been having difficulty bearing weight since then.  This happened a couple days ago.  She flexors progressively getting worse so she came in for evaluation.         Past Medical History:   Diagnosis Date    Anxiety and depression 11/2/2021    Pereyra esophagus     Chest pressure 10/22/2017    10/14/2014  SE Positive for clinical myocardial ischemia 10/14/2017  Cath  Moderate diffuse disease in mid and distal LAD, normal LVFX     GERD (gastroesophageal reflux disease)     Hyperlipidemia 11/2/2021    Hypertension     Obesity     Sleep apnea     sleeps with c pap      Current Outpatient Medications   Medication Sig Dispense Refill    ezetimibe (ZETIA) 10 MG tablet Take 1 tablet by mouth daily 30 tablet 3    Tirzepatide-Weight Management (ZEPBOUND) 2.5 MG/0.5ML SOAJ Inject 2.5 mg into the skin once a week 3 mL 1    ergocalciferol (ERGOCALCIFEROL) 1.25 MG (80711 UT) capsule Take 1 capsule by mouth once a week 4 capsule 3    vitamin D (ERGOCALCIFEROL) 1.25 MG (37033 UT) CAPS capsule TAKE 1 CAPSULE BY MOUTH ONE TIME PER WEEK 12 capsule 0    amLODIPine (NORVASC) 5 MG tablet Take 1 tablet by mouth daily 30 tablet 0    albuterol sulfate HFA (PROVENTIL HFA) 108 (90 Base) MCG/ACT inhaler Inhale 2 puffs into the lungs every 6 hours as needed for Wheezing 1 each 5    nystatin (MYCOSTATIN) 181809 UNIT/GM powder Apply topically 4 times daily. 60 g 1    ipratropium 0.5 mg-albuterol 2.5 mg (DUONEB) 0.5-2.5 (3) MG/3ML SOLN nebulizer solution Inhale 3 mLs into the lungs every 4 hours 360 mL 5    esomeprazole (NEXIUM) 40 MG delayed release capsule Take 1 capsule by mouth 2 times daily 180 capsule 3    butalbital-acetaminophen-caffeine (FIORICET, ESGIC)

## 2024-02-28 LAB
Lab: NORMAL
Lab: NORMAL

## 2024-02-29 ENCOUNTER — TELEPHONE (OUTPATIENT)
Dept: FAMILY MEDICINE CLINIC | Age: 59
End: 2024-02-29

## 2024-02-29 NOTE — TELEPHONE ENCOUNTER
----- Message from MICHELE Borden sent at 2/29/2024  7:39 AM CST -----  Please let patient know that genetic testing has returned and did not show an increased chance of cancer.

## 2024-03-12 ENCOUNTER — OFFICE VISIT (OUTPATIENT)
Dept: FAMILY MEDICINE CLINIC | Age: 59
End: 2024-03-12
Payer: COMMERCIAL

## 2024-03-12 VITALS
BODY MASS INDEX: 40.45 KG/M2 | OXYGEN SATURATION: 98 % | HEART RATE: 65 BPM | SYSTOLIC BLOOD PRESSURE: 126 MMHG | TEMPERATURE: 97.9 F | WEIGHT: 228.38 LBS | DIASTOLIC BLOOD PRESSURE: 86 MMHG

## 2024-03-12 DIAGNOSIS — E78.2 MIXED HYPERLIPIDEMIA: ICD-10-CM

## 2024-03-12 DIAGNOSIS — F41.9 ANXIETY AND DEPRESSION: ICD-10-CM

## 2024-03-12 DIAGNOSIS — F32.A ANXIETY AND DEPRESSION: ICD-10-CM

## 2024-03-12 DIAGNOSIS — I10 BENIGN ESSENTIAL HTN: Primary | ICD-10-CM

## 2024-03-12 PROCEDURE — 3074F SYST BP LT 130 MM HG: CPT | Performed by: NURSE PRACTITIONER

## 2024-03-12 PROCEDURE — 99214 OFFICE O/P EST MOD 30 MIN: CPT | Performed by: NURSE PRACTITIONER

## 2024-03-12 PROCEDURE — 3079F DIAST BP 80-89 MM HG: CPT | Performed by: NURSE PRACTITIONER

## 2024-03-12 RX ORDER — ESCITALOPRAM OXALATE 20 MG/1
20 TABLET ORAL DAILY
Qty: 90 TABLET | Refills: 3
Start: 2024-03-12

## 2024-03-12 RX ORDER — TIRZEPATIDE 5 MG/.5ML
5 INJECTION, SOLUTION SUBCUTANEOUS WEEKLY
Qty: 2 ML | Refills: 1 | Status: SHIPPED | OUTPATIENT
Start: 2024-03-12

## 2024-03-12 ASSESSMENT — ENCOUNTER SYMPTOMS
EYES NEGATIVE: 1
RESPIRATORY NEGATIVE: 1
GASTROINTESTINAL NEGATIVE: 1

## 2024-03-12 NOTE — PROGRESS NOTES
[facility]  [address] [place] [address]  Dept: [phone]  Dept Fax: [phone]  Loc: [phone]    Sara Armstrong is a 59 y.o. female who presents today for her medical conditions/complaints as noted below.  Sara Armstrong is c/o of Follow-up      Chief Complaint   Patient presents with    Follow-up       HPI:     HPI  Patient is here for follow-up on medication.  During last visit we did start patient on Paxil to see if this would help with hot flashes and anxiety.  We did switch from Lexapro to this Paxil.  Patient was started on Zepbound last month as well.  She reports that she has taken a few weeks worth of this medication.  She denies any side effects of this medication.  She is still taking the Zetia as well.  She denies any issues with this medication.    Past Medical History:   Diagnosis Date    Anxiety and depression 11/2/2021    Pereyra esophagus     Chest pressure 10/22/2017    10/14/2014  SE Positive for clinical myocardial ischemia 10/14/2017  Cath  Moderate diffuse disease in mid and distal LAD, normal LVFX     GERD (gastroesophageal reflux disease)     Hyperlipidemia 11/2/2021    Hypertension     Obesity     Sleep apnea     sleeps with c pap        Past Surgical History:   Procedure Laterality Date    CARDIAC CATHETERIZATION  10/14/13  JDT    EF 50%    CHOLECYSTECTOMY      COLONOSCOPY  01/12/2016    Dr Person- normal, suspect IB    COLONOSCOPY N/A 01/25/2021    Dr ILSA Ames-[facility]-5 yr recall    HYSTERECTOMY (CERVIX STATUS UNKNOWN)      OVARY REMOVAL      TONSILLECTOMY      UPPER GASTROINTESTINAL ENDOSCOPY  01/12/2016    Dr Layne:  Barretts esophagus    UPPER GASTROINTESTINAL ENDOSCOPY N/A 01/25/2021    Dr ILSA Ames-Gastritis, (+) Pereyra's, 3 yr recall       Social History     Tobacco Use    Smoking status: Never    Smokeless tobacco: Never   Substance Use Topics    Alcohol use: Not Currently     Comment: occ        Current Outpatient Medications

## 2024-03-27 DIAGNOSIS — I10 HYPERTENSION, UNSPECIFIED TYPE: ICD-10-CM

## 2024-03-27 RX ORDER — AMLODIPINE BESYLATE 5 MG/1
5 TABLET ORAL DAILY
Qty: 30 TABLET | Refills: 2 | Status: SHIPPED | OUTPATIENT
Start: 2024-03-27

## 2024-03-27 RX ORDER — TIRZEPATIDE 5 MG/.5ML
5 INJECTION, SOLUTION SUBCUTANEOUS WEEKLY
Qty: 2 ML | Refills: 1 | Status: SHIPPED | OUTPATIENT
Start: 2024-03-27

## 2024-03-27 NOTE — TELEPHONE ENCOUNTER
Tia called requesting a refill of the below medication which has been pended for you:     Requested Prescriptions     Pending Prescriptions Disp Refills    amLODIPine (NORVASC) 5 MG tablet [Pharmacy Med Name: amlodipine 5 mg tablet] 30 tablet 0     Sig: TAKE ONE TABLET BY MOUTH DAILY       Last Appointment Date: 2/26/2024  Next Appointment Date: 5/14/2024    Allergies   Allergen Reactions    Crestor [Rosuvastatin]      Leg cramps    Morphine Hives     IV Morphine

## 2024-04-11 ENCOUNTER — HOSPITAL ENCOUNTER (EMERGENCY)
Age: 59
Discharge: HOME | End: 2024-04-12
Attending: STUDENT IN AN ORGANIZED HEALTH CARE EDUCATION/TRAINING PROGRAM
Payer: COMMERCIAL

## 2024-04-11 ENCOUNTER — APPOINTMENT (OUTPATIENT)
Dept: CT IMAGING | Age: 59
End: 2024-04-11
Payer: COMMERCIAL

## 2024-04-11 VITALS
SYSTOLIC BLOOD PRESSURE: 134 MMHG | DIASTOLIC BLOOD PRESSURE: 85 MMHG | WEIGHT: 220 LBS | RESPIRATION RATE: 18 BRPM | BODY MASS INDEX: 36.65 KG/M2 | OXYGEN SATURATION: 98 % | HEIGHT: 65 IN | TEMPERATURE: 98.4 F | HEART RATE: 65 BPM

## 2024-04-11 DIAGNOSIS — K57.32 SIGMOID DIVERTICULITIS: Primary | ICD-10-CM

## 2024-04-11 DIAGNOSIS — R10.30 LOWER ABDOMINAL PAIN: ICD-10-CM

## 2024-04-11 LAB
ALBUMIN SERPL-MCNC: 4.4 G/DL (ref 3.5–5.2)
ALP SERPL-CCNC: 80 U/L (ref 35–104)
ALT SERPL-CCNC: 20 U/L (ref 5–33)
ANION GAP SERPL CALCULATED.3IONS-SCNC: 9 MMOL/L (ref 7–19)
AST SERPL-CCNC: 18 U/L (ref 5–32)
BASOPHILS # BLD: 0.1 K/UL (ref 0–0.2)
BASOPHILS NFR BLD: 0.8 % (ref 0–1)
BILIRUB SERPL-MCNC: 0.4 MG/DL (ref 0.2–1.2)
BILIRUB UR QL STRIP: NEGATIVE
BUN SERPL-MCNC: 15 MG/DL (ref 6–20)
CALCIUM SERPL-MCNC: 9.4 MG/DL (ref 8.6–10)
CHLORIDE SERPL-SCNC: 103 MMOL/L (ref 98–111)
CLARITY UR: CLEAR
CO2 SERPL-SCNC: 27 MMOL/L (ref 22–29)
COLOR UR: YELLOW
CREAT SERPL-MCNC: 0.9 MG/DL (ref 0.5–0.9)
EOSINOPHIL # BLD: 0.1 K/UL (ref 0–0.6)
EOSINOPHIL NFR BLD: 1.6 % (ref 0–5)
ERYTHROCYTE [DISTWIDTH] IN BLOOD BY AUTOMATED COUNT: 13.6 % (ref 11.5–14.5)
GLUCOSE SERPL-MCNC: 88 MG/DL (ref 74–109)
GLUCOSE UR STRIP.AUTO-MCNC: NEGATIVE MG/DL
HCT VFR BLD AUTO: 38.3 % (ref 37–47)
HGB BLD-MCNC: 12.4 G/DL (ref 12–16)
HGB UR STRIP.AUTO-MCNC: NEGATIVE MG/L
IMM GRANULOCYTES # BLD: 0 K/UL
KETONES UR STRIP.AUTO-MCNC: ABNORMAL MG/DL
LEUKOCYTE ESTERASE UR QL STRIP.AUTO: NEGATIVE
LIPASE SERPL-CCNC: 14 U/L (ref 13–60)
LYMPHOCYTES # BLD: 1.9 K/UL (ref 1.1–4.5)
LYMPHOCYTES NFR BLD: 29.7 % (ref 20–40)
MCH RBC QN AUTO: 30.8 PG (ref 27–31)
MCHC RBC AUTO-ENTMCNC: 32.4 G/DL (ref 33–37)
MCV RBC AUTO: 95 FL (ref 81–99)
MONOCYTES # BLD: 0.5 K/UL (ref 0–0.9)
MONOCYTES NFR BLD: 7.8 % (ref 0–10)
NEUTROPHILS # BLD: 3.8 K/UL (ref 1.5–7.5)
NEUTS SEG NFR BLD: 59.9 % (ref 50–65)
NITRITE UR QL STRIP.AUTO: NEGATIVE
PH UR STRIP.AUTO: 8 [PH] (ref 5–8)
PLATELET # BLD AUTO: 265 K/UL (ref 130–400)
PMV BLD AUTO: 9.5 FL (ref 9.4–12.3)
POTASSIUM SERPL-SCNC: 4.2 MMOL/L (ref 3.5–5)
PROT SERPL-MCNC: 7.8 G/DL (ref 6.6–8.7)
PROT UR STRIP.AUTO-MCNC: NEGATIVE MG/DL
RBC # BLD AUTO: 4.03 M/UL (ref 4.2–5.4)
SODIUM SERPL-SCNC: 139 MMOL/L (ref 136–145)
SP GR UR STRIP.AUTO: 1.02 (ref 1–1.03)
UROBILINOGEN UR STRIP.AUTO-MCNC: 0.2 E.U./DL
WBC # BLD AUTO: 6.3 K/UL (ref 4.8–10.8)

## 2024-04-11 PROCEDURE — 99285 EMERGENCY DEPT VISIT HI MDM: CPT

## 2024-04-11 PROCEDURE — 80053 COMPREHEN METABOLIC PANEL: CPT

## 2024-04-11 PROCEDURE — 6360000002 HC RX W HCPCS: Performed by: STUDENT IN AN ORGANIZED HEALTH CARE EDUCATION/TRAINING PROGRAM

## 2024-04-11 PROCEDURE — 83690 ASSAY OF LIPASE: CPT

## 2024-04-11 PROCEDURE — 36415 COLL VENOUS BLD VENIPUNCTURE: CPT

## 2024-04-11 PROCEDURE — 96375 TX/PRO/DX INJ NEW DRUG ADDON: CPT

## 2024-04-11 PROCEDURE — 6360000004 HC RX CONTRAST MEDICATION: Performed by: STUDENT IN AN ORGANIZED HEALTH CARE EDUCATION/TRAINING PROGRAM

## 2024-04-11 PROCEDURE — 96374 THER/PROPH/DIAG INJ IV PUSH: CPT

## 2024-04-11 PROCEDURE — 85025 COMPLETE CBC W/AUTO DIFF WBC: CPT

## 2024-04-11 PROCEDURE — 74177 CT ABD & PELVIS W/CONTRAST: CPT

## 2024-04-11 PROCEDURE — 81003 URINALYSIS AUTO W/O SCOPE: CPT

## 2024-04-11 PROCEDURE — 2580000003 HC RX 258: Performed by: STUDENT IN AN ORGANIZED HEALTH CARE EDUCATION/TRAINING PROGRAM

## 2024-04-11 RX ORDER — ONDANSETRON 2 MG/ML
4 INJECTION INTRAMUSCULAR; INTRAVENOUS ONCE
Status: COMPLETED | OUTPATIENT
Start: 2024-04-11 | End: 2024-04-11

## 2024-04-11 RX ORDER — 0.9 % SODIUM CHLORIDE 0.9 %
1000 INTRAVENOUS SOLUTION INTRAVENOUS ONCE
Status: COMPLETED | OUTPATIENT
Start: 2024-04-11 | End: 2024-04-12

## 2024-04-11 RX ORDER — FENTANYL CITRATE 50 UG/ML
50 INJECTION, SOLUTION INTRAMUSCULAR; INTRAVENOUS ONCE
Status: COMPLETED | OUTPATIENT
Start: 2024-04-11 | End: 2024-04-11

## 2024-04-11 RX ADMIN — IOPAMIDOL 70 ML: 755 INJECTION, SOLUTION INTRAVENOUS at 23:32

## 2024-04-11 RX ADMIN — SODIUM CHLORIDE 1000 ML: 9 INJECTION, SOLUTION INTRAVENOUS at 23:10

## 2024-04-11 RX ADMIN — FENTANYL CITRATE 50 MCG: 50 INJECTION INTRAMUSCULAR; INTRAVENOUS at 23:05

## 2024-04-11 RX ADMIN — ONDANSETRON 4 MG: 2 INJECTION INTRAMUSCULAR; INTRAVENOUS at 23:06

## 2024-04-11 ASSESSMENT — ENCOUNTER SYMPTOMS
CHEST TIGHTNESS: 0
COUGH: 0
SHORTNESS OF BREATH: 0
NAUSEA: 1
ABDOMINAL PAIN: 1
EYE REDNESS: 0
SORE THROAT: 0
EYE PAIN: 0
DIARRHEA: 0
VOMITING: 1

## 2024-04-11 ASSESSMENT — PAIN SCALES - GENERAL: PAINLEVEL_OUTOF10: 6

## 2024-04-12 PROCEDURE — 6370000000 HC RX 637 (ALT 250 FOR IP): Performed by: STUDENT IN AN ORGANIZED HEALTH CARE EDUCATION/TRAINING PROGRAM

## 2024-04-12 RX ORDER — HYDROCODONE BITARTRATE AND ACETAMINOPHEN 5; 325 MG/1; MG/1
1 TABLET ORAL EVERY 6 HOURS PRN
Qty: 8 TABLET | Refills: 0 | Status: SHIPPED | OUTPATIENT
Start: 2024-04-12 | End: 2024-04-15

## 2024-04-12 RX ORDER — AMOXICILLIN AND CLAVULANATE POTASSIUM 875; 125 MG/1; MG/1
1 TABLET, FILM COATED ORAL 2 TIMES DAILY
Qty: 20 TABLET | Refills: 0 | Status: SHIPPED | OUTPATIENT
Start: 2024-04-12 | End: 2024-04-22

## 2024-04-12 RX ORDER — AMOXICILLIN AND CLAVULANATE POTASSIUM 875; 125 MG/1; MG/1
1 TABLET, FILM COATED ORAL ONCE
Status: COMPLETED | OUTPATIENT
Start: 2024-04-12 | End: 2024-04-12

## 2024-04-12 RX ORDER — ONDANSETRON 4 MG/1
4 TABLET, ORALLY DISINTEGRATING ORAL 3 TIMES DAILY PRN
Qty: 21 TABLET | Refills: 0 | Status: SHIPPED | OUTPATIENT
Start: 2024-04-12

## 2024-04-12 RX ADMIN — AMOXICILLIN AND CLAVULANATE POTASSIUM 1 TABLET: 875; 125 TABLET, FILM COATED ORAL at 00:24

## 2024-04-12 NOTE — DISCHARGE INSTRUCTIONS
Concerning your diverticulitis:    Return to the emergency department if your symptoms worsen or change, you develop fevers, chills, nausea, vomiting, or if you are unable to tolerate antibiotics. Also return if you have acute worsening of your abdominal pain as we discussed, if you are not improving on antibiotics, or you have any concerns.    Complete entire course of antibiotics until pills are gone, even if your symptoms improve.    Use zofran as needed for nausea.    Use norco for pain as needed. Do not drink alcohol, drive, or take tylenol (acetaminophen) while taking norco because it may make you drowsy and norco has tylenol in it.  Also, be cautious combining this medication with any other pain medication, muscle relaxants, anxiety medications, and/or sleeping aids because they may have cumulative side effects.    Follow-up with your primary care doctor in the next 2-3 days.    The examination and treatment you have received in the Emergency Department has been given on an emergency basis only.    This limited encounter is not a replacement for the comprehensive services provided by a primary care physician. We recommend follow up for further preventative and ongoing mary kate screening, especially if your symptoms persists, worsen, or change in quality or character. When calling for a follow up appointment, please remember to inform the office that you were seen in the Emergency Department and that you are requesting a follow up visit.    Again, it is very important that you return immediately if your condition worsens, any new symptoms develop, or you do not recover as expected.

## 2024-04-12 NOTE — ED PROVIDER NOTES
[facility] EMERGENCY DEPT  eMERGENCY dEPARTMENT eNCOUnter      Pt Name: Sara Armstrong  MRN: [id number]  Birthdate 1965  Date of evaluation: 4/11/2024  Provider: Karley Hernandez MD    CHIEF COMPLAINT       Chief Complaint   Patient presents with    Abdominal Pain     Pt has had N/V for 2 hours starting at 1900. Pt reports RLQ pain that extends into her back.          HISTORY OF PRESENT ILLNESS   (Location/Symptom, Timing/Onset,Context/Setting, Quality, Duration, Modifying Factors, Severity)  Note limiting factors.     HPI    Sara Armstrong is a 59 y.o. female with PMH of Pereyra's esophagus, GERD, hypertension, hyperlipidemia, prior cholecystectomy, prior total abdominal hysterectomy who presents to the emergency department with CC of abdominal pain.  Patient reports that she had issues with GERD/reflux throughout the day and took all of her reflux medications without improvement, then later on she developed nausea and vomiting and began having some aching/pulling pain in her right lower quadrant which was different.  She rates it at a 5-6 out of 10.  Not associated with any fevers or diarrhea.  She does still have her appendix.  She does not have her ovaries.  She denies any respiratory symptoms.  She has not had much of an appetite today.  She denies any painful urinary symptoms.      NursingNotes were reviewed.    REVIEW OF SYSTEMS    (2-9 systems for level 4, 10 or more for level 5)     Review of Systems   Constitutional:  Positive for appetite change. Negative for chills, fatigue and fever.   HENT:  Negative for congestion and sore throat.    Eyes:  Negative for pain and redness.   Respiratory:  Negative for cough, chest tightness and shortness of breath.    Cardiovascular:  Negative for chest pain and leg swelling.   Gastrointestinal:  Positive for abdominal pain, nausea and vomiting. Negative for diarrhea.   Genitourinary:  Negative for dysuria and urgency.   Musculoskeletal:  Negative for neck pain and neck stiffness.  MG per tablet Take 1 tablet by mouth every 6 hours as needed for Pain for up to 3 days. Intended supply: 3 days. Take lowest dose possible to manage pain Max Daily Amount: 4 tablets, Disp-8 tablet, R-0Normal      !! ondansetron (ZOFRAN-ODT) 4 MG disintegrating tablet Take 1 tablet by mouth 3 times daily as needed for Nausea or Vomiting, Disp-21 tablet, R-0Normal      amoxicillin-clavulanate (AUGMENTIN) 875-125 MG per tablet Take 1 tablet by mouth 2 times daily for 10 days, Disp-20 tablet, R-0Normal       !! - Potential duplicate medications found. Please discuss with provider.             (Please note that portions of this note were completed with a voice recognition program.  Efforts were made to edit thedictations but occasionally words are mis-transcribed.)    Karley Hernandez MD (electronically signed)Emergency Physician          Karley Hernandez MD  04/12/24 0045

## 2024-05-01 ENCOUNTER — OFFICE VISIT (OUTPATIENT)
Dept: GASTROENTEROLOGY | Age: 59
End: 2024-05-01
Payer: COMMERCIAL

## 2024-05-01 VITALS
SYSTOLIC BLOOD PRESSURE: 139 MMHG | WEIGHT: 218 LBS | OXYGEN SATURATION: 98 % | BODY MASS INDEX: 36.32 KG/M2 | HEART RATE: 76 BPM | HEIGHT: 65 IN | DIASTOLIC BLOOD PRESSURE: 80 MMHG

## 2024-05-01 DIAGNOSIS — K21.9 GASTROESOPHAGEAL REFLUX DISEASE, UNSPECIFIED WHETHER ESOPHAGITIS PRESENT: ICD-10-CM

## 2024-05-01 DIAGNOSIS — K22.70 BARRETT'S ESOPHAGUS WITHOUT DYSPLASIA: Primary | ICD-10-CM

## 2024-05-01 PROCEDURE — 3079F DIAST BP 80-89 MM HG: CPT | Performed by: NURSE PRACTITIONER

## 2024-05-01 PROCEDURE — 3075F SYST BP GE 130 - 139MM HG: CPT | Performed by: NURSE PRACTITIONER

## 2024-05-01 PROCEDURE — 99214 OFFICE O/P EST MOD 30 MIN: CPT | Performed by: NURSE PRACTITIONER

## 2024-05-01 RX ORDER — PANTOPRAZOLE SODIUM 40 MG/1
40 TABLET, DELAYED RELEASE ORAL
Qty: 30 TABLET | Refills: 11 | Status: SHIPPED | OUTPATIENT
Start: 2024-05-01

## 2024-05-01 NOTE — PROGRESS NOTES
to their scheduled procedure to drive them home - they agree to the above and are willing to continue.     Orders  No orders of the defined types were placed in this encounter.    Medications  Orders Placed This Encounter   Medications    pantoprazole (PROTONIX) 40 MG tablet     Sig: Take 1 tablet by mouth every morning (before breakfast)     Dispense:  30 tablet     Refill:  11         Patient History:     Past Medical History:   Diagnosis Date    Anxiety and depression 11/2/2021    Pereyra esophagus     Chest pressure 10/22/2017    10/14/2014  SE Positive for clinical myocardial ischemia 10/14/2017  Cath  Moderate diffuse disease in mid and distal LAD, normal LVFX     GERD (gastroesophageal reflux disease)     Hyperlipidemia 11/2/2021    Hypertension     Obesity     Sleep apnea     sleeps with c pap       Past Surgical History:   Procedure Laterality Date    CARDIAC CATHETERIZATION  10/14/13  JDT    EF 50%    CHOLECYSTECTOMY      COLONOSCOPY  01/12/2016    Dr Person- normal, suspect IB    COLONOSCOPY N/A 01/25/2021    Dr ILSA Ames-[facility]-5 yr recall    HYSTERECTOMY (CERVIX STATUS UNKNOWN)      OVARY REMOVAL      TONSILLECTOMY      UPPER GASTROINTESTINAL ENDOSCOPY  01/12/2016    Dr Layne:  Barretts esophagus    UPPER GASTROINTESTINAL ENDOSCOPY N/A 01/25/2021    Dr ILSA Ames-Gastritis, (+) Pereyra's, 3 yr recall       Family History   Problem Relation Age of Onset    Colon Polyps Mother     Lung Cancer Mother 74    Liver Cancer Mother 74    Lung Cancer Father 57    Esophageal Cancer Father 57    Liver Cancer Father 57    Cancer Brother 47        salivary gland cancer-The parotid glands    Stroke Maternal Grandmother     Stomach Cancer Maternal Grandmother 72    Ovarian Cancer Paternal Grandmother 66    Liver Cancer Paternal Grandfather 67    Colon Cancer Neg Hx     Rectal Cancer Neg Hx     Liver Disease Neg Hx        Social History     Socioeconomic History    Marital status:    Tobacco Use    Smoking status:

## 2024-05-07 RX ORDER — TIRZEPATIDE 2.5 MG/.5ML
2.5 INJECTION, SOLUTION SUBCUTANEOUS WEEKLY
Qty: 2 ML | Refills: 5 | Status: SHIPPED | OUTPATIENT
Start: 2024-05-07

## 2024-05-07 ASSESSMENT — ENCOUNTER SYMPTOMS
VOMITING: 0
RECTAL PAIN: 0
ABDOMINAL PAIN: 0
ABDOMINAL DISTENTION: 0
NAUSEA: 0
CHOKING: 0
COUGH: 0
CONSTIPATION: 0
SHORTNESS OF BREATH: 0
BLOOD IN STOOL: 0
TROUBLE SWALLOWING: 0
ANAL BLEEDING: 0
DIARRHEA: 0

## 2024-05-08 ENCOUNTER — TELEPHONE (OUTPATIENT)
Dept: GASTROENTEROLOGY | Age: 59
End: 2024-05-08

## 2024-05-08 NOTE — TELEPHONE ENCOUNTER
[facility]  [facility] to remind them of their procedure with Dr. Dennis Ames at [facility]  on 5/13/2024 to arrive at 8:45AM    CONFIRMED  PATIENT HAS PREP INSTR & PRESCRIPTION

## 2024-05-10 ENCOUNTER — ANESTHESIA EVENT (OUTPATIENT)
Dept: OPERATING ROOM | Age: 59
End: 2024-05-10

## 2024-05-13 ENCOUNTER — HOSPITAL ENCOUNTER (OUTPATIENT)
Age: 59
Setting detail: SPECIMEN
Discharge: HOME | End: 2024-05-13
Payer: COMMERCIAL

## 2024-05-13 ENCOUNTER — HOSPITAL ENCOUNTER (OUTPATIENT)
Age: 59
Setting detail: OUTPATIENT SURGERY
Discharge: HOME | End: 2024-05-13
Attending: INTERNAL MEDICINE | Admitting: INTERNAL MEDICINE

## 2024-05-13 ENCOUNTER — APPOINTMENT (OUTPATIENT)
Dept: OPERATING ROOM | Age: 59
End: 2024-05-13
Attending: INTERNAL MEDICINE

## 2024-05-13 ENCOUNTER — ANESTHESIA (OUTPATIENT)
Dept: OPERATING ROOM | Age: 59
End: 2024-05-13

## 2024-05-13 VITALS
HEART RATE: 62 BPM | HEIGHT: 65 IN | RESPIRATION RATE: 16 BRPM | OXYGEN SATURATION: 99 % | TEMPERATURE: 96.7 F | SYSTOLIC BLOOD PRESSURE: 124 MMHG | WEIGHT: 217 LBS | DIASTOLIC BLOOD PRESSURE: 63 MMHG | BODY MASS INDEX: 36.15 KG/M2

## 2024-05-13 PROCEDURE — G8907 PT DOC NO EVENTS ON DISCHARG: HCPCS

## 2024-05-13 PROCEDURE — 88305 TISSUE EXAM BY PATHOLOGIST: CPT

## 2024-05-13 PROCEDURE — G8918 PT W/O PREOP ORDER IV AB PRO: HCPCS

## 2024-05-13 PROCEDURE — 88342 IMHCHEM/IMCYTCHM 1ST ANTB: CPT

## 2024-05-13 PROCEDURE — 43239 EGD BIOPSY SINGLE/MULTIPLE: CPT

## 2024-05-13 RX ORDER — SODIUM CHLORIDE, SODIUM LACTATE, POTASSIUM CHLORIDE, CALCIUM CHLORIDE 600; 310; 30; 20 MG/100ML; MG/100ML; MG/100ML; MG/100ML
INJECTION, SOLUTION INTRAVENOUS CONTINUOUS
Status: DISCONTINUED | OUTPATIENT
Start: 2024-05-13 | End: 2024-05-13 | Stop reason: HOSPADM

## 2024-05-13 RX ORDER — LIDOCAINE HYDROCHLORIDE 10 MG/ML
INJECTION, SOLUTION INFILTRATION; PERINEURAL PRN
Status: DISCONTINUED | OUTPATIENT
Start: 2024-05-13 | End: 2024-05-13 | Stop reason: SDUPTHER

## 2024-05-13 RX ORDER — PROPOFOL 10 MG/ML
INJECTION, EMULSION INTRAVENOUS PRN
Status: DISCONTINUED | OUTPATIENT
Start: 2024-05-13 | End: 2024-05-13 | Stop reason: SDUPTHER

## 2024-05-13 RX ADMIN — LIDOCAINE HYDROCHLORIDE 40 MG: 10 INJECTION, SOLUTION INFILTRATION; PERINEURAL at 09:57

## 2024-05-13 RX ADMIN — PROPOFOL 150 MG: 10 INJECTION, EMULSION INTRAVENOUS at 09:57

## 2024-05-13 RX ADMIN — SODIUM CHLORIDE, SODIUM LACTATE, POTASSIUM CHLORIDE, CALCIUM CHLORIDE: 600; 310; 30; 20 INJECTION, SOLUTION INTRAVENOUS at 09:26

## 2024-05-13 ASSESSMENT — PAIN - FUNCTIONAL ASSESSMENT
PAIN_FUNCTIONAL_ASSESSMENT: NONE - DENIES PAIN

## 2024-05-13 NOTE — ANESTHESIA PRE PROCEDURE
Department of Anesthesiology  Preprocedure Note       Name:  Sara Armstrong   Age:  59 y.o.  :  1965                                          MRN:  [id number]         Date:  2024      Surgeon: Surgeon(s):  Dennis Ames MD    Procedure: Procedure(s):  ESOPHAGOGASTRODUODENOSCOPY BIOPSY    Medications prior to admission:   Prior to Admission medications    Medication Sig Start Date End Date Taking? Authorizing Provider   Tirzepatide-Weight Management (ZEPBOUND) 2.5 MG/0.5ML SOAJ Inject 2.5 mg into the skin once a week 24   Erica Montenegro APRN   pantoprazole (PROTONIX) 40 MG tablet Take 1 tablet by mouth every morning (before breakfast) 24   Jenni Constantino APRN   ondansetron (ZOFRAN-ODT) 4 MG disintegrating tablet Take 1 tablet by mouth 3 times daily as needed for Nausea or Vomiting 24   Karley Hernandez MD   amLODIPine (NORVASC) 5 MG tablet TAKE ONE TABLET BY MOUTH DAILY 3/27/24   Erica Montenegro APRN   escitalopram (LEXAPRO) 20 MG tablet Take 1 tablet by mouth daily 3/12/24   Erica Montenegro APRN   ezetimibe (ZETIA) 10 MG tablet Take 1 tablet by mouth daily 24   Erica Montenegro APRN   ergocalciferol (ERGOCALCIFEROL) 1.25 MG (92841 UT) capsule Take 1 capsule by mouth once a week 24   Erica Montenegro APRN   albuterol sulfate HFA (PROVENTIL HFA) 108 (90 Base) MCG/ACT inhaler Inhale 2 puffs into the lungs every 6 hours as needed for Wheezing 23   Christiano Wiley MD   nystatin (MYCOSTATIN) 503264 UNIT/GM powder Apply topically 4 times daily. 23   Christiano Wiley MD   ipratropium 0.5 mg-albuterol 2.5 mg (DUONEB) 0.5-2.5 (3) MG/3ML SOLN nebulizer solution Inhale 3 mLs into the lungs every 4 hours 23   Christiano Wiley MD   esomeprazole (NEXIUM) 40 MG delayed release capsule Take 1 capsule by mouth 2 times daily  Patient taking differently: Take 1 capsule by mouth every morning (before breakfast) 23   Christiano Wiley MD   butalbital-acetaminophen-caffeine (FIORICET, ESGIC)

## 2024-05-13 NOTE — ANESTHESIA POSTPROCEDURE EVALUATION
Department of Anesthesiology  Postprocedure Note    Patient: Sara Armstrong  MRN: [id number]  YOB: 1965  Date of evaluation: 5/13/2024    Procedure Summary       Date: 05/13/24 Room / Location: [place] [address] / [facility]    Anesthesia Start: 0943 Anesthesia Stop:     Procedure: ESOPHAGOGASTRODUODENOSCOPY BIOPSY (Esophagus) Diagnosis:       Pereyra's esophagus without dysplasia      Gastroesophageal reflux disease, unspecified whether esophagitis present      Epigastric pain      (Pereyra's esophagus without dysplasia [K22.70])      (Gastroesophageal reflux disease, unspecified whether esophagitis present [K21.9])      (Epigastric pain [R10.13])    Surgeons: Dennis Ames MD Responsible Provider: Princess Rose APRN - CRNA    Anesthesia Type: general, TIVA ASA Status: 3            Anesthesia Type: No value filed.    Garcia Phase I:      Garcia Phase II:      Anesthesia Post Evaluation    Patient location during evaluation: bedside  Patient participation: complete - patient participated  Level of consciousness: sleepy but conscious  Pain score: 0  Airway patency: patent  Nausea & Vomiting: no nausea and no vomiting  Cardiovascular status: hemodynamically stable and blood pressure returned to baseline  Respiratory status: acceptable and nasal cannula  Hydration status: stable  Pain management: adequate    No notable events documented.

## 2024-05-13 NOTE — OP NOTE
Dennis Ames MD.  Electronically signed by Parisa Mckeon RN on 5/13/2024 at 9:57 AM    I personally performed the services described in this documentation as scribed by Parisa Mckeon, and it appears accurate and complete.     Dennis Ames MD  5/13/2024

## 2024-05-13 NOTE — DISCHARGE INSTRUCTIONS
RECOMMENDATIONS:    1.  Await path results  2.  Continue PPI  3.  Repeat EGD in 3 years.   4.  Follow up OV with GI APRN IN 6-8 weeks.     POST-OP ORDERS: ENDOSCOPY & COLONOSCOPY:    1. Rest today.    2. DO NOT eat or drink until wide awake; eat your usual diet today in moderate amount only.    3. DO NOT drive today.    4. Call physician if you have severe pain, vomiting, fever, rectal bleeding or black bowel movements.    5.  If a biopsy was taken or a polyp removed, you should expect to hear results in about 7-10 days.  If you have heard nothing from your physician by then, call the office for results.    6.  Discharge home when patient awake, vitals signs stable and tolerating liquids.    7. Call with questions or concerns [phone].

## 2024-05-13 NOTE — H&P
depression 11/2/2021    Pereyra esophagus     Chest pressure 10/22/2017    10/14/2014  SE Positive for clinical myocardial ischemia 10/14/2017  Cath  Moderate diffuse disease in mid and distal LAD, normal LVFX     GERD (gastroesophageal reflux disease)     Hyperlipidemia 11/2/2021    Hypertension     Obesity     Sleep apnea     sleeps with c pap       Past Surgical History:  Past Surgical History:   Procedure Laterality Date    CARDIAC CATHETERIZATION  10/14/13  JDT    EF 50%    CHOLECYSTECTOMY      COLONOSCOPY  01/12/2016    Dr Person- normal, suspect IB    COLONOSCOPY N/A 01/25/2021    Dr ILSA Ames-[facility]-5 yr recall    EYE SURGERY      pciol ou    HYSTERECTOMY (CERVIX STATUS UNKNOWN)      OVARY REMOVAL      TONSILLECTOMY      UPPER GASTROINTESTINAL ENDOSCOPY  01/12/2016    Dr Layne:  Barretts esophagus    UPPER GASTROINTESTINAL ENDOSCOPY N/A 01/25/2021    Dr ILSA Ames-Gastritis, (+) Pereyra's, 3 yr recall       Social History:  Social History     Tobacco Use    Smoking status: Never    Smokeless tobacco: Never   Vaping Use    Vaping Use: Never used   Substance Use Topics    Alcohol use: Yes     Comment: 1 month    Drug use: No       Vital Signs:   Vitals:    05/13/24 0919   BP: 136/70   Pulse: 51   Resp: 16   Temp: 97.1 °F (36.2 °C)   SpO2: 95%        Physical Exam:  Cardiac:  [x]WNL  []Comments:  Pulmonary:  [x]WNL   []Comments:  Neuro/Mental Status:  [x]WNL  []Comments:  Abdominal:  [x]WNL    []Comments:  Other:   []WNL  []Comments:    Informed Consent:  The risks and benefits of the procedure have been discussed with either the patient or if they cannot consent, their representative.    Assessment:  Patient examined and appropriate for planned sedation and procedure.     Plan:  Proceed with planned sedation and procedure as above.     Parisa TUCKER am scribing for and in the presence of Dr. Dennis Ames MD.  Electronically signed by Parisa Mckeon RN on 5/13/2024 at 9:37 AM    I personally performed

## 2024-05-21 ENCOUNTER — PATIENT MESSAGE (OUTPATIENT)
Dept: FAMILY MEDICINE CLINIC | Age: 59
End: 2024-05-21

## 2024-05-21 DIAGNOSIS — K22.70 BARRETT'S ESOPHAGUS DETERMINED BY ENDOSCOPY: ICD-10-CM

## 2024-05-21 DIAGNOSIS — K22.70 BARRETT'S ESOPHAGUS DETERMINED BY ENDOSCOPY: Primary | ICD-10-CM

## 2024-05-21 DIAGNOSIS — R12 CHRONIC HEARTBURN: ICD-10-CM

## 2024-05-21 RX ORDER — ESOMEPRAZOLE MAGNESIUM 40 MG/1
40 CAPSULE, DELAYED RELEASE ORAL 2 TIMES DAILY
Qty: 180 CAPSULE | Refills: 3 | Status: SHIPPED | OUTPATIENT
Start: 2024-05-21

## 2024-05-21 RX ORDER — ESOMEPRAZOLE MAGNESIUM 40 MG/1
40 CAPSULE, DELAYED RELEASE ORAL 2 TIMES DAILY
Qty: 180 CAPSULE | Refills: 3 | OUTPATIENT
Start: 2024-05-21

## 2024-05-21 NOTE — TELEPHONE ENCOUNTER
From: Sara Armstrong  To: Dr. Christiano Wiley  Sent: 5/21/2024 4:33 AM CDT  Subject: Nexium    When I had my appointment for my annual endo, the NP stopped my Nexium and wanted me to try Protonix. The Protonix isn’t working! Can I have my Nexium back please. Nexium 40mg BID either 30day or 90 day called to [facility] please. Thanks!

## 2024-06-03 RX ORDER — TIRZEPATIDE 2.5 MG/.5ML
2.5 INJECTION, SOLUTION SUBCUTANEOUS WEEKLY
Qty: 2 ML | Refills: 1 | Status: SHIPPED | OUTPATIENT
Start: 2024-06-03

## 2024-06-13 DIAGNOSIS — Z12.31 SCREENING MAMMOGRAM FOR BREAST CANCER: Primary | ICD-10-CM

## 2024-06-16 ENCOUNTER — HOSPITAL ENCOUNTER (EMERGENCY)
Age: 59
Discharge: HOME | End: 2024-06-16
Attending: EMERGENCY MEDICINE
Payer: COMMERCIAL

## 2024-06-16 ENCOUNTER — APPOINTMENT (OUTPATIENT)
Dept: CT IMAGING | Age: 59
End: 2024-06-16
Payer: COMMERCIAL

## 2024-06-16 ENCOUNTER — APPOINTMENT (OUTPATIENT)
Dept: GENERAL RADIOLOGY | Age: 59
End: 2024-06-16
Payer: COMMERCIAL

## 2024-06-16 VITALS
SYSTOLIC BLOOD PRESSURE: 96 MMHG | TEMPERATURE: 97.9 F | WEIGHT: 215 LBS | DIASTOLIC BLOOD PRESSURE: 66 MMHG | OXYGEN SATURATION: 98 % | RESPIRATION RATE: 16 BRPM | HEART RATE: 85 BPM | BODY MASS INDEX: 35.78 KG/M2

## 2024-06-16 DIAGNOSIS — R11.2 NAUSEA AND VOMITING, UNSPECIFIED VOMITING TYPE: ICD-10-CM

## 2024-06-16 DIAGNOSIS — R51.9 ACUTE NONINTRACTABLE HEADACHE, UNSPECIFIED HEADACHE TYPE: ICD-10-CM

## 2024-06-16 DIAGNOSIS — R05.1 ACUTE COUGH: Primary | ICD-10-CM

## 2024-06-16 LAB
ALBUMIN SERPL-MCNC: 3.9 G/DL (ref 3.5–5.2)
ALP SERPL-CCNC: 137 U/L (ref 35–104)
ALT SERPL-CCNC: 88 U/L (ref 5–33)
ANION GAP SERPL CALCULATED.3IONS-SCNC: 12 MMOL/L (ref 7–19)
AST SERPL-CCNC: 50 U/L (ref 5–32)
B PARAP IS1001 DNA NPH QL NAA+NON-PROBE: NOT DETECTED
B PERT.PT PRMT NPH QL NAA+NON-PROBE: NOT DETECTED
BACTERIA #/AREA URNS HPF: ABNORMAL /HPF
BASOPHILS # BLD: 0.1 K/UL (ref 0–0.2)
BASOPHILS NFR BLD: 1 % (ref 0–1)
BILIRUB SERPL-MCNC: 0.4 MG/DL (ref 0.2–1.2)
BILIRUB UR QL STRIP: ABNORMAL
BNP BLD-MCNC: <36 PG/ML (ref 0–124)
BUN SERPL-MCNC: 14 MG/DL (ref 6–20)
C PNEUM DNA NPH QL NAA+NON-PROBE: NOT DETECTED
CALCIUM SERPL-MCNC: 9 MG/DL (ref 8.6–10)
CHLORIDE SERPL-SCNC: 98 MMOL/L (ref 98–111)
CLARITY UR: ABNORMAL
CO2 SERPL-SCNC: 25 MMOL/L (ref 22–29)
COLOR UR: ABNORMAL
CREAT SERPL-MCNC: 1 MG/DL (ref 0.5–0.9)
EOSINOPHIL # BLD: 0.1 K/UL (ref 0–0.6)
EOSINOPHIL NFR BLD: 2.1 % (ref 0–5)
ERYTHROCYTE [DISTWIDTH] IN BLOOD BY AUTOMATED COUNT: 14.1 % (ref 11.5–14.5)
FLUAV RNA NPH QL NAA+NON-PROBE: NOT DETECTED
FLUBV RNA NPH QL NAA+NON-PROBE: NOT DETECTED
GLUCOSE SERPL-MCNC: 106 MG/DL (ref 74–109)
GLUCOSE UR STRIP.AUTO-MCNC: NEGATIVE MG/DL
HADV DNA NPH QL NAA+NON-PROBE: NOT DETECTED
HCOV 229E RNA NPH QL NAA+NON-PROBE: NOT DETECTED
HCOV HKU1 RNA NPH QL NAA+NON-PROBE: NOT DETECTED
HCOV NL63 RNA NPH QL NAA+NON-PROBE: NOT DETECTED
HCOV OC43 RNA NPH QL NAA+NON-PROBE: NOT DETECTED
HCT VFR BLD AUTO: 39.6 % (ref 37–47)
HGB BLD-MCNC: 12.5 G/DL (ref 12–16)
HGB UR STRIP.AUTO-MCNC: ABNORMAL MG/L
HMPV RNA NPH QL NAA+NON-PROBE: NOT DETECTED
HPIV1 RNA NPH QL NAA+NON-PROBE: NOT DETECTED
HPIV2 RNA NPH QL NAA+NON-PROBE: NOT DETECTED
HPIV3 RNA NPH QL NAA+NON-PROBE: NOT DETECTED
HPIV4 RNA NPH QL NAA+NON-PROBE: NOT DETECTED
HYALINE CASTS #/AREA URNS LPF: ABNORMAL /LPF (ref 0–5)
IMM GRANULOCYTES # BLD: 0 K/UL
KETONES UR STRIP.AUTO-MCNC: ABNORMAL MG/DL
LEUKOCYTE ESTERASE UR QL STRIP.AUTO: ABNORMAL
LYMPHOCYTES # BLD: 1.6 K/UL (ref 1.1–4.5)
LYMPHOCYTES NFR BLD: 26 % (ref 20–40)
M PNEUMO DNA NPH QL NAA+NON-PROBE: NOT DETECTED
MCH RBC QN AUTO: 30.2 PG (ref 27–31)
MCHC RBC AUTO-ENTMCNC: 31.6 G/DL (ref 33–37)
MCV RBC AUTO: 95.7 FL (ref 81–99)
MONOCYTES # BLD: 0.7 K/UL (ref 0–0.9)
MONOCYTES NFR BLD: 11.9 % (ref 0–10)
NEUTROPHILS # BLD: 3.7 K/UL (ref 1.5–7.5)
NEUTS SEG NFR BLD: 58.8 % (ref 50–65)
NITRITE UR QL STRIP.AUTO: NEGATIVE
PH UR STRIP.AUTO: 5.5 [PH] (ref 5–8)
PLATELET # BLD AUTO: 210 K/UL (ref 130–400)
PMV BLD AUTO: 9.4 FL (ref 9.4–12.3)
POTASSIUM SERPL-SCNC: 4.1 MMOL/L (ref 3.5–5)
PROCALCITONIN: 0.09 NG/ML (ref 0–0.09)
PROT SERPL-MCNC: 7.3 G/DL (ref 6.6–8.7)
PROT UR STRIP.AUTO-MCNC: 100 MG/DL
RBC # BLD AUTO: 4.14 M/UL (ref 4.2–5.4)
RBC #/AREA URNS HPF: ABNORMAL /HPF (ref 0–2)
RSV RNA NPH QL NAA+NON-PROBE: NOT DETECTED
RV+EV RNA NPH QL NAA+NON-PROBE: NOT DETECTED
SARS-COV-2 RNA NPH QL NAA+NON-PROBE: NOT DETECTED
SODIUM SERPL-SCNC: 135 MMOL/L (ref 136–145)
SP GR UR STRIP.AUTO: 1.03 (ref 1–1.03)
SQUAMOUS #/AREA URNS HPF: ABNORMAL /HPF
UROBILINOGEN UR STRIP.AUTO-MCNC: 1 E.U./DL
WBC # BLD AUTO: 6.2 K/UL (ref 4.8–10.8)
WBC #/AREA URNS HPF: ABNORMAL /HPF (ref 0–5)

## 2024-06-16 PROCEDURE — 93005 ELECTROCARDIOGRAM TRACING: CPT | Performed by: EMERGENCY MEDICINE

## 2024-06-16 PROCEDURE — 70450 CT HEAD/BRAIN W/O DYE: CPT

## 2024-06-16 PROCEDURE — 84145 PROCALCITONIN (PCT): CPT

## 2024-06-16 PROCEDURE — 81001 URINALYSIS AUTO W/SCOPE: CPT

## 2024-06-16 PROCEDURE — 96374 THER/PROPH/DIAG INJ IV PUSH: CPT

## 2024-06-16 PROCEDURE — 36415 COLL VENOUS BLD VENIPUNCTURE: CPT

## 2024-06-16 PROCEDURE — 80053 COMPREHEN METABOLIC PANEL: CPT

## 2024-06-16 PROCEDURE — 2580000003 HC RX 258: Performed by: EMERGENCY MEDICINE

## 2024-06-16 PROCEDURE — 6360000002 HC RX W HCPCS: Performed by: EMERGENCY MEDICINE

## 2024-06-16 PROCEDURE — 83880 ASSAY OF NATRIURETIC PEPTIDE: CPT

## 2024-06-16 PROCEDURE — 85025 COMPLETE CBC W/AUTO DIFF WBC: CPT

## 2024-06-16 PROCEDURE — 71045 X-RAY EXAM CHEST 1 VIEW: CPT

## 2024-06-16 PROCEDURE — 96375 TX/PRO/DX INJ NEW DRUG ADDON: CPT

## 2024-06-16 PROCEDURE — 0202U NFCT DS 22 TRGT SARS-COV-2: CPT

## 2024-06-16 PROCEDURE — 99285 EMERGENCY DEPT VISIT HI MDM: CPT

## 2024-06-16 RX ORDER — KETOROLAC TROMETHAMINE 30 MG/ML
15 INJECTION, SOLUTION INTRAMUSCULAR; INTRAVENOUS ONCE
Status: COMPLETED | OUTPATIENT
Start: 2024-06-16 | End: 2024-06-16

## 2024-06-16 RX ORDER — METOCLOPRAMIDE HYDROCHLORIDE 5 MG/ML
10 INJECTION INTRAMUSCULAR; INTRAVENOUS ONCE
Status: COMPLETED | OUTPATIENT
Start: 2024-06-16 | End: 2024-06-16

## 2024-06-16 RX ORDER — PROMETHAZINE HYDROCHLORIDE 12.5 MG/1
12.5 TABLET ORAL 3 TIMES DAILY PRN
Qty: 12 TABLET | Refills: 0 | Status: SHIPPED | OUTPATIENT
Start: 2024-06-16 | End: 2024-06-23

## 2024-06-16 RX ORDER — ONDANSETRON 4 MG/1
4 TABLET, ORALLY DISINTEGRATING ORAL 3 TIMES DAILY PRN
Qty: 21 TABLET | Refills: 0 | Status: SHIPPED | OUTPATIENT
Start: 2024-06-16

## 2024-06-16 RX ORDER — DEXAMETHASONE SODIUM PHOSPHATE 10 MG/ML
8 INJECTION, SOLUTION INTRAMUSCULAR; INTRAVENOUS ONCE
Status: COMPLETED | OUTPATIENT
Start: 2024-06-16 | End: 2024-06-16

## 2024-06-16 RX ORDER — SODIUM CHLORIDE, SODIUM LACTATE, POTASSIUM CHLORIDE, AND CALCIUM CHLORIDE .6; .31; .03; .02 G/100ML; G/100ML; G/100ML; G/100ML
1000 INJECTION, SOLUTION INTRAVENOUS ONCE
Status: COMPLETED | OUTPATIENT
Start: 2024-06-16 | End: 2024-06-16

## 2024-06-16 RX ADMIN — METOCLOPRAMIDE 10 MG: 5 INJECTION, SOLUTION INTRAMUSCULAR; INTRAVENOUS at 16:45

## 2024-06-16 RX ADMIN — KETOROLAC TROMETHAMINE 15 MG: 30 INJECTION, SOLUTION INTRAMUSCULAR at 16:45

## 2024-06-16 RX ADMIN — SODIUM CHLORIDE, POTASSIUM CHLORIDE, SODIUM LACTATE AND CALCIUM CHLORIDE 1000 ML: 600; 310; 30; 20 INJECTION, SOLUTION INTRAVENOUS at 16:52

## 2024-06-16 RX ADMIN — DEXAMETHASONE SODIUM PHOSPHATE 8 MG: 10 INJECTION, SOLUTION INTRAMUSCULAR; INTRAVENOUS at 16:45

## 2024-06-16 NOTE — ED PROVIDER NOTES
[facility] EMERGENCY DEPT  EMERGENCY DEPARTMENT ENCOUNTER      Pt Name: Sara Armstrong  MRN: [id number]  Birthdate 1965  Date of evaluation: 6/16/2024  Provider: Jorge Garza Jr, MD    CHIEF COMPLAINT       Chief Complaint   Patient presents with    Cough     Cough and chest congestion    Dizziness         HISTORY OF PRESENT ILLNESS   (Location/Symptom, Timing/Onset,Context/Setting, Quality, Duration, Modifying Factors, Severity)  Note limiting factors.   Sara Armstrong is a 59 y.o. female who presents to the emergency department for evaluation after having several symptoms of the last few days.  Started having cough with chest tightness 3 days ago associated with right-sided headache.  Symptoms have been persistent for the last several days since onset.  Woke up this morning and has had dizziness with balance difficulty.  Feels like she is falling to the left when she walks.  Started having a fever last night, then this morning had nausea and vomiting.    HPI    NursingNotes were reviewed.    REVIEW OF SYSTEMS    (2-9 systems for level 4, 10 or more for level 5)     Review of Systems    A complete review of systems was performed and is negative except as noted above in the HPI.       PAST MEDICAL HISTORY     Past Medical History:   Diagnosis Date    Anxiety and depression 11/2/2021    Pereyra esophagus     Chest pressure 10/22/2017    10/14/2014  SE Positive for clinical myocardial ischemia 10/14/2017  Cath  Moderate diffuse disease in mid and distal LAD, normal LVFX     GERD (gastroesophageal reflux disease)     Hyperlipidemia 11/2/2021    Hypertension     Obesity     Sleep apnea     sleeps with c pap         SURGICAL HISTORY       Past Surgical History:   Procedure Laterality Date    CARDIAC CATHETERIZATION  10/14/13  JDT    EF 50%    CHOLECYSTECTOMY      COLONOSCOPY  01/12/2016    Dr Person- normal, suspect IB    COLONOSCOPY N/A 01/25/2021    Dr ILSA Ames-[facility]-5 yr recall    EYE SURGERY      pciol ou    HYSTERECTOMY (CERVIX

## 2024-06-17 LAB
EKG P AXIS: 33 DEGREES
EKG P-R INTERVAL: 158 MS
EKG Q-T INTERVAL: 420 MS
EKG QRS DURATION: 90 MS
EKG QTC CALCULATION (BAZETT): 410 MS
EKG T AXIS: 20 DEGREES

## 2024-06-17 PROCEDURE — 93010 ELECTROCARDIOGRAM REPORT: CPT | Performed by: INTERNAL MEDICINE

## 2024-06-18 ENCOUNTER — OFFICE VISIT (OUTPATIENT)
Dept: FAMILY MEDICINE CLINIC | Age: 59
End: 2024-06-18

## 2024-06-18 VITALS
WEIGHT: 216 LBS | SYSTOLIC BLOOD PRESSURE: 134 MMHG | DIASTOLIC BLOOD PRESSURE: 76 MMHG | TEMPERATURE: 99 F | OXYGEN SATURATION: 95 % | BODY MASS INDEX: 35.94 KG/M2 | HEART RATE: 78 BPM

## 2024-06-18 DIAGNOSIS — J45.909 REACTIVE AIRWAY DISEASE WITHOUT COMPLICATION, UNSPECIFIED ASTHMA SEVERITY, UNSPECIFIED WHETHER PERSISTENT: Primary | ICD-10-CM

## 2024-06-18 DIAGNOSIS — G44.229 CHRONIC TENSION-TYPE HEADACHE, NOT INTRACTABLE: ICD-10-CM

## 2024-06-18 DIAGNOSIS — E78.5 HYPERLIPIDEMIA, UNSPECIFIED HYPERLIPIDEMIA TYPE: ICD-10-CM

## 2024-06-18 PROCEDURE — 99999 PR OFFICE/OUTPT VISIT,PROCEDURE ONLY: CPT | Performed by: FAMILY MEDICINE

## 2024-06-18 RX ORDER — EZETIMIBE 10 MG/1
10 TABLET ORAL DAILY
Qty: 30 TABLET | Refills: 3 | Status: SHIPPED | OUTPATIENT
Start: 2024-06-18

## 2024-06-18 RX ORDER — BUTALBITAL, ACETAMINOPHEN AND CAFFEINE 50; 325; 40 MG/1; MG/1; MG/1
1 TABLET ORAL EVERY 6 HOURS PRN
Qty: 60 TABLET | Refills: 0 | Status: SHIPPED | OUTPATIENT
Start: 2024-06-18

## 2024-06-18 SDOH — ECONOMIC STABILITY: HOUSING INSECURITY
IN THE LAST 12 MONTHS, WAS THERE A TIME WHEN YOU DID NOT HAVE A STEADY PLACE TO SLEEP OR SLEPT IN A SHELTER (INCLUDING NOW)?: NO

## 2024-06-18 SDOH — ECONOMIC STABILITY: FOOD INSECURITY: WITHIN THE PAST 12 MONTHS, THE FOOD YOU BOUGHT JUST DIDN'T LAST AND YOU DIDN'T HAVE MONEY TO GET MORE.: NEVER TRUE

## 2024-06-18 SDOH — ECONOMIC STABILITY: FOOD INSECURITY: WITHIN THE PAST 12 MONTHS, YOU WORRIED THAT YOUR FOOD WOULD RUN OUT BEFORE YOU GOT MONEY TO BUY MORE.: NEVER TRUE

## 2024-06-18 SDOH — ECONOMIC STABILITY: INCOME INSECURITY: HOW HARD IS IT FOR YOU TO PAY FOR THE VERY BASICS LIKE FOOD, HOUSING, MEDICAL CARE, AND HEATING?: NOT HARD AT ALL

## 2024-06-18 NOTE — PROGRESS NOTES
Patient here for hospital follow-up.  She appears to have an active exacerbation that responded to outpatient therapy will do a direct admission for observation

## 2024-06-21 ENCOUNTER — TELEPHONE (OUTPATIENT)
Dept: FAMILY MEDICINE CLINIC | Age: 59
End: 2024-06-21

## 2024-06-21 NOTE — TELEPHONE ENCOUNTER
Care Transitions Initial Follow Up Call    Outreach made within 2 business days of discharge: Yes    Patient: Sara Armstrong Patient : 1965   MRN: [id number]  Reason for Admission:  Bronchitis and Asthma  Discharge Date: 24       Spoke with: Patient    Discharge department/facility: [facility] Interactive Patient Contact:  Was patient able to fill all prescriptions: Yes  Was patient instructed to bring all medications to the follow-up visit: Yes  Is patient taking all medications as directed in the discharge summary? Yes  Does patient understand their discharge instructions: Yes  Does patient have questions or concerns that need addressed prior to 7-14 day follow up office visit: no    Scheduled appointment with PCP within 7-14 days    Follow Up  Future Appointments   Date Time Provider Department Center   2024  9:45 AM Jenni Constantino APRN [facility]   7/3/2024  3:30 PM Christiano Wiley MD [facility]       Kristy Lara LPN

## 2024-07-03 ENCOUNTER — OFFICE VISIT (OUTPATIENT)
Dept: FAMILY MEDICINE CLINIC | Age: 59
End: 2024-07-03
Payer: COMMERCIAL

## 2024-07-03 VITALS
OXYGEN SATURATION: 97 % | WEIGHT: 224 LBS | TEMPERATURE: 97.3 F | BODY MASS INDEX: 37.28 KG/M2 | HEART RATE: 73 BPM | DIASTOLIC BLOOD PRESSURE: 82 MMHG | SYSTOLIC BLOOD PRESSURE: 134 MMHG

## 2024-07-03 DIAGNOSIS — J45.909 REACTIVE AIRWAY DISEASE WITHOUT COMPLICATION, UNSPECIFIED ASTHMA SEVERITY, UNSPECIFIED WHETHER PERSISTENT: Primary | ICD-10-CM

## 2024-07-03 DIAGNOSIS — B37.2 SKIN YEAST INFECTION: ICD-10-CM

## 2024-07-03 PROCEDURE — 3075F SYST BP GE 130 - 139MM HG: CPT | Performed by: FAMILY MEDICINE

## 2024-07-03 PROCEDURE — 3079F DIAST BP 80-89 MM HG: CPT | Performed by: FAMILY MEDICINE

## 2024-07-03 PROCEDURE — 99214 OFFICE O/P EST MOD 30 MIN: CPT | Performed by: FAMILY MEDICINE

## 2024-07-03 RX ORDER — NYSTATIN 100000 [USP'U]/G
POWDER TOPICAL
Qty: 60 G | Refills: 1 | Status: SHIPPED | OUTPATIENT
Start: 2024-07-03

## 2024-07-03 RX ORDER — PREDNISONE 20 MG/1
20 TABLET ORAL 2 TIMES DAILY
Qty: 6 TABLET | Refills: 0 | Status: SHIPPED | OUTPATIENT
Start: 2024-07-03 | End: 2024-07-06

## 2024-07-03 SDOH — ECONOMIC STABILITY: FOOD INSECURITY: WITHIN THE PAST 12 MONTHS, THE FOOD YOU BOUGHT JUST DIDN'T LAST AND YOU DIDN'T HAVE MONEY TO GET MORE.: NEVER TRUE

## 2024-07-03 SDOH — ECONOMIC STABILITY: INCOME INSECURITY: HOW HARD IS IT FOR YOU TO PAY FOR THE VERY BASICS LIKE FOOD, HOUSING, MEDICAL CARE, AND HEATING?: NOT HARD AT ALL

## 2024-07-03 SDOH — ECONOMIC STABILITY: FOOD INSECURITY: WITHIN THE PAST 12 MONTHS, YOU WORRIED THAT YOUR FOOD WOULD RUN OUT BEFORE YOU GOT MONEY TO BUY MORE.: NEVER TRUE

## 2024-07-03 ASSESSMENT — ENCOUNTER SYMPTOMS
WHEEZING: 1
ALLERGIC/IMMUNOLOGIC NEGATIVE: 1
EYES NEGATIVE: 1
GASTROINTESTINAL NEGATIVE: 1
CHEST TIGHTNESS: 1
COUGH: 1

## 2024-07-03 NOTE — PROGRESS NOTES
SUBJECTIVE:    Patient ID: Sara Armstrong is a 59 y.o.female.    HPI:   Patient here for hospital follow-up  Patient is a 59-year-old female.  She was seen at [facility] for upper respiratory infection/reactive airway disease.  At that time she was not taking her Dulera.  She improved with antibiotics and steroids.  She has been doing well until the last day or so when she started coughing again.  She have some wheeze when she walked into the office.  She had not done the breathing treatment in a while.  She was seen by pulmonary before but no final diagnosis was ever determined.  She also complained of a yeast infection under her her right breast.  She would like to have some nystatin powder.  She have this before.         Past Medical History:   Diagnosis Date    Anxiety and depression 11/2/2021    Pereyra esophagus     Chest pressure 10/22/2017    10/14/2014  SE Positive for clinical myocardial ischemia 10/14/2017  Cath  Moderate diffuse disease in mid and distal LAD, normal LVFX     GERD (gastroesophageal reflux disease)     Hyperlipidemia 11/2/2021    Hypertension     Obesity     Reactive airway disease without complication 7/3/2024    Sleep apnea     sleeps with c pap      Current Outpatient Medications   Medication Sig Dispense Refill    nystatin (MYCOSTATIN) 193722 UNIT/GM powder Apply topically 4 times daily. 60 g 1    predniSONE (DELTASONE) 20 MG tablet Take 1 tablet by mouth 2 times daily for 3 days 6 tablet 0    mometasone-formoterol (DULERA) 200-5 MCG/ACT inhaler INHALE 2 PUFFS INTO THE LUNGS EVERY 12 HOURS. 13 each 5    ezetimibe (ZETIA) 10 MG tablet Take 1 tablet by mouth daily 30 tablet 3    butalbital-acetaminophen-caffeine (FIORICET, ESGIC) -40 MG per tablet Take 1 tablet by mouth every 6 hours as needed for Headaches 60 tablet 0    ondansetron (ZOFRAN-ODT) 4 MG disintegrating tablet Take 1 tablet by mouth 3 times daily as needed for Nausea or Vomiting 21 tablet 0    esomeprazole (NEXIUM) 40 MG delayed

## 2024-07-23 ENCOUNTER — PATIENT MESSAGE (OUTPATIENT)
Dept: FAMILY MEDICINE CLINIC | Age: 59
End: 2024-07-23

## 2024-07-24 RX ORDER — TIRZEPATIDE 2.5 MG/.5ML
2.5 INJECTION, SOLUTION SUBCUTANEOUS WEEKLY
Qty: 2 ML | Refills: 0 | Status: SHIPPED | OUTPATIENT
Start: 2024-07-24

## 2024-07-24 NOTE — TELEPHONE ENCOUNTER
From: Sara Armstrong  To: Dr. Christiano Wiley  Sent: 7/23/2024 3:12 PM CDT  Subject: Medication/referral    Can I please try to get back on Zepbound 2.5?  And I was wondering about the pulmonologist referral, I have not heard from them. I woke up this morning wheezing again but I’m thinking maybe it’s the weather change.

## 2024-07-30 ENCOUNTER — TELEPHONE (OUTPATIENT)
Dept: FAMILY MEDICINE CLINIC | Age: 59
End: 2024-07-30

## 2024-07-30 NOTE — TELEPHONE ENCOUNTER
[facility] PA for Zepbound 2.5 mg. Weekly. PA through Cover my meds says \"Clinical Override not needed\"     [facility] [employer] [facility] says the PA says plan limit exceeded, yet the PA from was approved from 1/22/24 to 10/18/24.     Approved   2/21/2024 10:04 AM  Case ID: [id number] Appeal supported: No  Note from payer: CaseId:[id number];Status:Approved;Review Type:Prior Auth;Coverage Start Date:01/22/2024;Coverage End Date:10/18/2024;       They are stating the pharmacy needs to call the help desk. It is not anything with our office that needs to be done. I spoke with CODY ROSENBAUM and relayed that information to Jessica. She will call them.

## 2024-08-20 ENCOUNTER — PATIENT MESSAGE (OUTPATIENT)
Dept: FAMILY MEDICINE CLINIC | Age: 59
End: 2024-08-20

## 2024-08-20 DIAGNOSIS — E78.5 HYPERLIPIDEMIA, UNSPECIFIED HYPERLIPIDEMIA TYPE: ICD-10-CM

## 2024-08-20 DIAGNOSIS — G89.29 CHRONIC LOW BACK PAIN, UNSPECIFIED BACK PAIN LATERALITY, UNSPECIFIED WHETHER SCIATICA PRESENT: ICD-10-CM

## 2024-08-20 DIAGNOSIS — F32.A ANXIETY AND DEPRESSION: ICD-10-CM

## 2024-08-20 DIAGNOSIS — F41.9 ANXIETY AND DEPRESSION: ICD-10-CM

## 2024-08-20 DIAGNOSIS — M54.50 CHRONIC LOW BACK PAIN, UNSPECIFIED BACK PAIN LATERALITY, UNSPECIFIED WHETHER SCIATICA PRESENT: ICD-10-CM

## 2024-08-20 DIAGNOSIS — J45.909 REACTIVE AIRWAY DISEASE WITHOUT COMPLICATION, UNSPECIFIED ASTHMA SEVERITY, UNSPECIFIED WHETHER PERSISTENT: ICD-10-CM

## 2024-08-20 DIAGNOSIS — F41.8 SITUATIONAL ANXIETY: ICD-10-CM

## 2024-08-20 DIAGNOSIS — J18.9 PNEUMONIA DUE TO INFECTIOUS ORGANISM, UNSPECIFIED LATERALITY, UNSPECIFIED PART OF LUNG: Primary | ICD-10-CM

## 2024-08-20 DIAGNOSIS — I10 HYPERTENSION, UNSPECIFIED TYPE: ICD-10-CM

## 2024-08-20 DIAGNOSIS — J18.9 PNEUMONIA DUE TO INFECTIOUS ORGANISM, UNSPECIFIED LATERALITY, UNSPECIFIED PART OF LUNG: ICD-10-CM

## 2024-08-20 DIAGNOSIS — R06.2 WHEEZING: ICD-10-CM

## 2024-08-20 RX ORDER — TIZANIDINE 4 MG/1
4 TABLET ORAL EVERY 8 HOURS PRN
Qty: 60 TABLET | Refills: 3 | Status: SHIPPED | OUTPATIENT
Start: 2024-08-20

## 2024-08-21 RX ORDER — BUSPIRONE HYDROCHLORIDE 10 MG/1
10 TABLET ORAL 2 TIMES DAILY PRN
Qty: 180 TABLET | Refills: 3 | Status: SHIPPED | OUTPATIENT
Start: 2024-08-21 | End: 2025-08-21

## 2024-08-21 RX ORDER — EZETIMIBE 10 MG/1
10 TABLET ORAL DAILY
Qty: 90 TABLET | Refills: 3 | Status: SHIPPED | OUTPATIENT
Start: 2024-08-21 | End: 2025-08-21

## 2024-08-21 RX ORDER — AMLODIPINE BESYLATE 5 MG/1
5 TABLET ORAL DAILY
Qty: 90 TABLET | Refills: 3 | Status: SHIPPED | OUTPATIENT
Start: 2024-08-21

## 2024-08-21 RX ORDER — ESCITALOPRAM OXALATE 20 MG/1
20 TABLET ORAL DAILY
Qty: 90 TABLET | Refills: 3 | Status: SHIPPED | OUTPATIENT
Start: 2024-08-21 | End: 2025-08-21

## 2024-08-21 RX ORDER — ESOMEPRAZOLE MAGNESIUM 40 MG/1
40 CAPSULE, DELAYED RELEASE ORAL
Qty: 90 CAPSULE | Refills: 3 | Status: SHIPPED | OUTPATIENT
Start: 2024-08-21 | End: 2025-08-21

## 2024-08-21 RX ORDER — BACLOFEN 10 MG/1
10 TABLET ORAL 3 TIMES DAILY
Qty: 270 TABLET | Refills: 3 | Status: SHIPPED | OUTPATIENT
Start: 2024-08-21 | End: 2025-08-21

## 2024-08-21 RX ORDER — ERGOCALCIFEROL 1.25 MG/1
50000 CAPSULE ORAL WEEKLY
Qty: 12 CAPSULE | Refills: 3 | Status: SHIPPED | OUTPATIENT
Start: 2024-08-21

## 2024-08-21 RX ORDER — IPRATROPIUM BROMIDE AND ALBUTEROL SULFATE 2.5; .5 MG/3ML; MG/3ML
1 SOLUTION RESPIRATORY (INHALATION) EVERY 6 HOURS PRN
Qty: 360 EACH | Refills: 3 | Status: SHIPPED | OUTPATIENT
Start: 2024-08-21 | End: 2025-08-21

## 2024-08-21 NOTE — TELEPHONE ENCOUNTER
Received fax from pharmacy requesting refill on pts medication(s). Pt was last seen in office on 7/3/2024  and has a follow up scheduled for Visit date not found. Will send request to  Erica Montenegro  for authorization.     Requested Prescriptions     Pending Prescriptions Disp Refills    vitamin D (ERGOCALCIFEROL) 1.25 MG (48880 UT) CAPS capsule [Pharmacy Med Name: VIT D-2 CAP(ERGOCAL)1.25MG 50,000U] 12 capsule 3     Sig: Take 1 capsule by mouth once a week    amLODIPine (NORVASC) 5 MG tablet [Pharmacy Med Name: AMLODIPINE BESYLATE TABS 5MG] 90 tablet 3     Sig: Take 1 tablet by mouth daily

## 2024-08-22 RX ORDER — TIRZEPATIDE 2.5 MG/.5ML
2.5 INJECTION, SOLUTION SUBCUTANEOUS WEEKLY
Qty: 2 ML | Refills: 5 | Status: SHIPPED | OUTPATIENT
Start: 2024-08-22

## 2024-08-28 ENCOUNTER — OFFICE VISIT (OUTPATIENT)
Dept: GASTROENTEROLOGY | Age: 59
End: 2024-08-28
Payer: COMMERCIAL

## 2024-08-28 VITALS
BODY MASS INDEX: 37.32 KG/M2 | OXYGEN SATURATION: 97 % | HEART RATE: 68 BPM | WEIGHT: 224 LBS | SYSTOLIC BLOOD PRESSURE: 118 MMHG | DIASTOLIC BLOOD PRESSURE: 80 MMHG | HEIGHT: 65 IN

## 2024-08-28 DIAGNOSIS — K22.70 BARRETT'S ESOPHAGUS WITHOUT DYSPLASIA: ICD-10-CM

## 2024-08-28 DIAGNOSIS — K21.9 GASTROESOPHAGEAL REFLUX DISEASE, UNSPECIFIED WHETHER ESOPHAGITIS PRESENT: ICD-10-CM

## 2024-08-28 DIAGNOSIS — K59.00 CONSTIPATION, UNSPECIFIED CONSTIPATION TYPE: Primary | ICD-10-CM

## 2024-08-28 PROCEDURE — 3074F SYST BP LT 130 MM HG: CPT | Performed by: NURSE PRACTITIONER

## 2024-08-28 PROCEDURE — 3079F DIAST BP 80-89 MM HG: CPT | Performed by: NURSE PRACTITIONER

## 2024-08-28 PROCEDURE — 99213 OFFICE O/P EST LOW 20 MIN: CPT | Performed by: NURSE PRACTITIONER

## 2024-08-28 NOTE — PROGRESS NOTES
Subjective:     Patient ID: Sara Armstrong is a 59 y.o. female  PCP: Christiano Wiley MD  Referring Provider: No ref. provider found    HPI  Patient presents to the office today with the following complaints: Follow-up      Patient seen in the office today for follow up after EGD and Colonoscopy   EGD, Colonoscopy and pathology reports reviewed and discussed with the patient and all questions answered   Reports are in Epic  Denies any postprocedure complications       Reports she has vomiting with bowel movements occasionally, states this does not happen with every bowel movement     Assessment:     1. Constipation, unspecified constipation type  2. Pereyra's esophagus without dysplasia  3. Gastroesophageal reflux disease, unspecified whether esophagitis present       Review of Systems   Constitutional:  Negative for activity change, appetite change, fatigue, fever and unexpected weight change.   HENT:  Negative for trouble swallowing.    Respiratory:  Negative for cough, choking and shortness of breath.    Cardiovascular:  Negative for chest pain.   Gastrointestinal:  Positive for constipation. Negative for abdominal distention, abdominal pain, anal bleeding, blood in stool, diarrhea, nausea, rectal pain and vomiting.        GERD     Allergic/Immunologic: Negative for food allergies.   All other systems reviewed and are negative.      Plan:   May add MiraLAX 17 g once or twice daily for constipation to be adjusted to desired effect of 1-2 soft stools not requiring excessive straining.  Will consider other prescription laxatives later if necessary  Follow up in 3 months     Orders  No orders of the defined types were placed in this encounter.    Medications  No orders of the defined types were placed in this encounter.        Patient History:     Past Medical History:   Diagnosis Date    Anxiety and depression 11/2/2021    Pereyra esophagus     Chest pressure 10/22/2017    10/14/2014  SE Positive for clinical myocardial

## 2024-09-03 ENCOUNTER — OFFICE VISIT (OUTPATIENT)
Dept: PULMONOLOGY | Age: 59
End: 2024-09-03
Payer: COMMERCIAL

## 2024-09-03 VITALS
SYSTOLIC BLOOD PRESSURE: 138 MMHG | TEMPERATURE: 97.5 F | BODY MASS INDEX: 37.45 KG/M2 | HEART RATE: 70 BPM | WEIGHT: 224.8 LBS | HEIGHT: 65 IN | DIASTOLIC BLOOD PRESSURE: 75 MMHG | OXYGEN SATURATION: 97 %

## 2024-09-03 DIAGNOSIS — J45.20 MILD INTERMITTENT REACTIVE AIRWAY DISEASE WITHOUT COMPLICATION: ICD-10-CM

## 2024-09-03 DIAGNOSIS — G47.33 OSA (OBSTRUCTIVE SLEEP APNEA): ICD-10-CM

## 2024-09-03 DIAGNOSIS — K22.70 BARRETT'S ESOPHAGUS DETERMINED BY ENDOSCOPY: ICD-10-CM

## 2024-09-03 DIAGNOSIS — J45.20 MILD INTERMITTENT REACTIVE AIRWAY DISEASE WITHOUT COMPLICATION: Primary | ICD-10-CM

## 2024-09-03 DIAGNOSIS — K21.9 GASTROESOPHAGEAL REFLUX DISEASE WITHOUT ESOPHAGITIS: ICD-10-CM

## 2024-09-03 DIAGNOSIS — R06.83 SNORING: ICD-10-CM

## 2024-09-03 DIAGNOSIS — G47.30 MILD SLEEP APNEA: ICD-10-CM

## 2024-09-03 DIAGNOSIS — G47.8 NON-RESTORATIVE SLEEP: ICD-10-CM

## 2024-09-03 LAB
BASOPHILS # BLD: 0.1 K/UL (ref 0–0.2)
BASOPHILS NFR BLD: 1.3 % (ref 0–1)
EOSINOPHIL # BLD: 0.2 K/UL (ref 0–0.6)
EOSINOPHIL NFR BLD: 3.2 % (ref 0–5)
ERYTHROCYTE [DISTWIDTH] IN BLOOD BY AUTOMATED COUNT: 13.4 % (ref 11.5–14.5)
ERYTHROCYTE [SEDIMENTATION RATE] IN BLOOD BY WESTERGREN METHOD: 11 MM/HR (ref 0–25)
HCT VFR BLD AUTO: 37.5 % (ref 37–47)
HGB BLD-MCNC: 12 G/DL (ref 12–16)
IGG SERPL-MCNC: 967 MG/DL (ref 700–1600)
IGM SERPL-MCNC: 137 MG/DL (ref 40–230)
IMM GRANULOCYTES # BLD: 0 K/UL
LYMPHOCYTES # BLD: 2.1 K/UL (ref 1.1–4.5)
LYMPHOCYTES NFR BLD: 39.3 % (ref 20–40)
MCH RBC QN AUTO: 30.2 PG (ref 27–31)
MCHC RBC AUTO-ENTMCNC: 32 G/DL (ref 33–37)
MCV RBC AUTO: 94.5 FL (ref 81–99)
MONOCYTES # BLD: 0.4 K/UL (ref 0–0.9)
MONOCYTES NFR BLD: 7.9 % (ref 0–10)
NEUTROPHILS # BLD: 2.6 K/UL (ref 1.5–7.5)
NEUTS SEG NFR BLD: 47.9 % (ref 50–65)
PLATELET # BLD AUTO: 205 K/UL (ref 130–400)
PMV BLD AUTO: 10.9 FL (ref 9.4–12.3)
RBC # BLD AUTO: 3.97 M/UL (ref 4.2–5.4)
RHEUMATOID FACT SER NEPH-ACNC: 11 IU/ML
WBC # BLD AUTO: 5.3 K/UL (ref 4.8–10.8)

## 2024-09-03 PROCEDURE — 3075F SYST BP GE 130 - 139MM HG: CPT | Performed by: INTERNAL MEDICINE

## 2024-09-03 PROCEDURE — 3078F DIAST BP <80 MM HG: CPT | Performed by: INTERNAL MEDICINE

## 2024-09-03 PROCEDURE — 99204 OFFICE O/P NEW MOD 45 MIN: CPT | Performed by: INTERNAL MEDICINE

## 2024-09-03 ASSESSMENT — ENCOUNTER SYMPTOMS
SHORTNESS OF BREATH: 1
SHORTNESS OF BREATH: 0
CONSTIPATION: 1
BLOOD IN STOOL: 0
VOMITING: 0
COUGH: 0
CHEST TIGHTNESS: 0
DIARRHEA: 0
APNEA: 1
ABDOMINAL DISTENTION: 0
ANAL BLEEDING: 0
RHINORRHEA: 0
TROUBLE SWALLOWING: 0
WHEEZING: 1
BACK PAIN: 0
ABDOMINAL DISTENTION: 0
ABDOMINAL PAIN: 0
NAUSEA: 0
CHOKING: 0
ABDOMINAL PAIN: 0
ANAL BLEEDING: 0
RECTAL PAIN: 0
COUGH: 0

## 2024-09-03 NOTE — PROGRESS NOTES
capsule Take 1 capsule by mouth once a week Yes Erica Montenegro APRN   amLODIPine (NORVASC) 5 MG tablet Take 1 tablet by mouth daily Yes Erica Montenegro APRN   esomeprazole (NEXIUM) 40 MG delayed release capsule Take 1 capsule by mouth every morning (before breakfast) Yes Christiano Wiley MD   busPIRone (BUSPAR) 10 MG tablet Take 1 tablet by mouth 2 times daily as needed (anxiety) Yes Christiano Wiley MD   nystatin (MYCOSTATIN) 538909 UNIT/GM powder Apply topically 4 times daily. Yes Christiano Wiley MD   butalbital-acetaminophen-caffeine (FIORICET, ESGIC) -40 MG per tablet Take 1 tablet by mouth every 6 hours as needed for Headaches Yes Christiano Wiley MD   ondansetron (ZOFRAN-ODT) 4 MG disintegrating tablet Take 1 tablet by mouth 3 times daily as needed for Nausea or Vomiting Yes Jorge Garza Jr., MD   albuterol sulfate HFA (PROVENTIL HFA) 108 (90 Base) MCG/ACT inhaler Inhale 2 puffs into the lungs every 6 hours as needed for Wheezing Yes Christiano Wiley MD   Respiratory Therapy Supplies DANA Renew cpap and supplies for continuation of HS use. Yes Nahed Weiss APRN   CPAP Machine MISC by Does not apply route  Patient not taking: Reported on 9/3/2024  Karley Israel APRN        Review of Systems   Constitutional:  Negative for activity change, appetite change, chills, diaphoresis and fatigue.   HENT:  Negative for congestion, dental problem, drooling, ear discharge, postnasal drip and rhinorrhea.    Eyes:  Negative for visual disturbance.   Respiratory:  Positive for apnea, shortness of breath and wheezing. Negative for cough and chest tightness.    Gastrointestinal:  Negative for abdominal distention, abdominal pain and anal bleeding.   Endocrine: Negative for cold intolerance, heat intolerance and polydipsia.   Genitourinary:  Negative for difficulty urinating, dysuria, enuresis and flank pain.   Musculoskeletal:  Negative for arthralgias, back pain and gait problem.   Allergic/Immunologic: Negative for environmental

## 2024-09-05 LAB
IGE SERPL-ACNC: 14 KU/L
NUCLEAR IGG SER QL IA: NORMAL

## 2024-09-06 LAB
A1AT PHENOTYP SERPL-IMP: NORMAL
A1AT SERPL-MCNC: 159 MG/DL (ref 90–200)
STRONGYLOIDES IGG SER IA-ACNC: 0.2 IV

## 2024-10-07 ENCOUNTER — HOSPITAL ENCOUNTER (OUTPATIENT)
Dept: SLEEP CENTER | Age: 59
Discharge: HOME | End: 2024-10-09
Payer: COMMERCIAL

## 2024-10-07 DIAGNOSIS — R06.83 SNORING: ICD-10-CM

## 2024-10-07 DIAGNOSIS — G47.33 OSA (OBSTRUCTIVE SLEEP APNEA): ICD-10-CM

## 2024-10-07 DIAGNOSIS — G47.8 NON-RESTORATIVE SLEEP: ICD-10-CM

## 2024-10-07 PROCEDURE — G0399 HOME SLEEP TEST/TYPE 3 PORTA: HCPCS

## 2024-10-09 PROCEDURE — G0399 HOME SLEEP TEST/TYPE 3 PORTA: HCPCS

## 2024-10-10 NOTE — PROGRESS NOTES
[facility]  [address] [place] [address]  Phone [phone] Fax [phone]       Patient Name Sara Armstrong Account Number [id number]    1965 Referring Provider Anastasiia Garcia M.D., ALLAN, SURESH   Age/ Gender 59 years/F Interpreting physician Anastasiia Garcia M.D., [facility], SURESH   Neck circumference Unavailable Device Es LombardoOne   Mallampati classification Unavailable Scoring Technician Gabby Dominguez, CRT, RPSGT   Columbia score 15/24 Indications for the test excessive daytime somnolence   Height 65.0 inches Test Home Sleep Apnea Test   Weight 224.0 lbs Date of test 10/9/2024   BMI 37.3 Time in Bed (TIB) 323.5 minutes     Events   Index (#/hr) Total # Events Mean Duration (sec) Max Duration (sec) Events by Position        Supine        # Index   Central Apneas 0.0 0 0.0 0.0 0 0.0   Obstructive Apneas 8.7 47 12.5 19.0 29 11.3   Mixed Apneas 0.0 0 0.0 0.0 0 0.0   Hypopneas 35.4 191 18.5 87.5 135 52.7   Estimated AHI  #events/TIB (hrs) 44.1 238 17.3 87.5 64.0   Time in Position (minutes) 153.7     Snoring  TST with snoring 252.9   % of TST with snoring 78.2     Oximetry distribution  <90 %  (minutes) 22.0   <85 %  (minutes) 1.9   <80 %  (minutes) 0.3   <75 %  (minutes) 0.0   <70 %  (minutes) 0.0   <60 %  (minutes) 0.0   <50 %  (minutes) 0.0   Average (%) 92   Desat Index (#/hour) 47.3   Desat Max (%) 16   Desat Max dur (sec) 105.5   Lowest SpO2 (³ 2 sec) (%) 72         Heart Rate  Mean HR (BPM) 66.4   # of LHR 18   LHR min (BPM) 50   # of HHR 0   HHR max (BPM) 94         Interpretation:      Severe obstructive sleep apnea.   Obesity.   Subjective hypersomnia.      Recommendations:     Consider Auto CPAP to titrate between 8cm water pressure and 20cm water pressure.   Weight loss and exercise.   Avoid risky activity such as driving if sleepy.   Discuss sleep hygiene with the patient.   Monitor PAP use and effectiveness.        Anastasiia Garcia MD, 
Pt in the office to  HST monitor.  Pt was educated on how to use equipment properly and instructed to wear for 2 nights and to return on Wednesday.  Pt states she understood.    
FCCP, DABSM

## 2024-10-13 DIAGNOSIS — G47.33 OSA (OBSTRUCTIVE SLEEP APNEA): Primary | ICD-10-CM

## 2024-10-15 ENCOUNTER — FOLLOWUP TELEPHONE ENCOUNTER (OUTPATIENT)
Dept: SLEEP CENTER | Age: 59
End: 2024-10-15

## 2024-10-22 ENCOUNTER — HOSPITAL ENCOUNTER (OUTPATIENT)
Dept: PULMONOLOGY | Age: 59
Discharge: HOME | End: 2024-10-22
Attending: INTERNAL MEDICINE
Payer: COMMERCIAL

## 2024-10-22 VITALS — WEIGHT: 224 LBS | OXYGEN SATURATION: 99 % | BODY MASS INDEX: 37.32 KG/M2 | RESPIRATION RATE: 14 BRPM | HEIGHT: 65 IN

## 2024-10-22 DIAGNOSIS — J45.20 MILD INTERMITTENT REACTIVE AIRWAY DISEASE WITHOUT COMPLICATION: ICD-10-CM

## 2024-10-22 PROCEDURE — 6360000002 HC RX W HCPCS: Performed by: INTERNAL MEDICINE

## 2024-10-22 PROCEDURE — 94060 EVALUATION OF WHEEZING: CPT

## 2024-10-22 PROCEDURE — 94729 DIFFUSING CAPACITY: CPT

## 2024-10-22 PROCEDURE — 94726 PLETHYSMOGRAPHY LUNG VOLUMES: CPT

## 2024-10-22 RX ORDER — ALBUTEROL SULFATE 0.83 MG/ML
2.5 SOLUTION RESPIRATORY (INHALATION) 2 TIMES DAILY PRN
Status: DISCONTINUED | OUTPATIENT
Start: 2024-10-22 | End: 2024-10-24 | Stop reason: HOSPADM

## 2024-10-22 RX ADMIN — ALBUTEROL SULFATE 2.5 MG: 2.5 SOLUTION RESPIRATORY (INHALATION) at 08:40

## 2024-10-22 NOTE — PROCEDURES
Media Information          Pulmonary Function Study    Interpretation:    The FVC is normal. FEV1 is Normal. FEV1/FVC ratio is Normal. After bronchodilator therapy there was no significant improvement in FEV1.     Total lung capacity is Normal. Residual volume is Normal.      Diffusing lung capacity when corrected for alveolar volume is Normal.         Impression:    Normal pulmonary function.        Anastasiia Garcia MD, FCCP, [facility]

## 2024-12-03 ENCOUNTER — OFFICE VISIT (OUTPATIENT)
Dept: FAMILY MEDICINE CLINIC | Age: 59
End: 2024-12-03

## 2024-12-03 VITALS
BODY MASS INDEX: 37.32 KG/M2 | DIASTOLIC BLOOD PRESSURE: 88 MMHG | SYSTOLIC BLOOD PRESSURE: 134 MMHG | WEIGHT: 224 LBS | OXYGEN SATURATION: 98 % | HEART RATE: 76 BPM | TEMPERATURE: 98.2 F | HEIGHT: 65 IN

## 2024-12-03 DIAGNOSIS — J40 BRONCHITIS: ICD-10-CM

## 2024-12-03 DIAGNOSIS — J40 BRONCHITIS: Primary | ICD-10-CM

## 2024-12-03 LAB
INFLUENZA A ANTIBODY: NORMAL
INFLUENZA B ANTIBODY: NORMAL

## 2024-12-03 RX ORDER — ONDANSETRON 4 MG/1
4 TABLET, FILM COATED ORAL EVERY 8 HOURS PRN
Qty: 30 TABLET | Refills: 0 | Status: SHIPPED | OUTPATIENT
Start: 2024-12-03

## 2024-12-03 SDOH — ECONOMIC STABILITY: FOOD INSECURITY: WITHIN THE PAST 12 MONTHS, THE FOOD YOU BOUGHT JUST DIDN'T LAST AND YOU DIDN'T HAVE MONEY TO GET MORE.: NEVER TRUE

## 2024-12-03 SDOH — ECONOMIC STABILITY: INCOME INSECURITY: HOW HARD IS IT FOR YOU TO PAY FOR THE VERY BASICS LIKE FOOD, HOUSING, MEDICAL CARE, AND HEATING?: NOT HARD AT ALL

## 2024-12-03 SDOH — ECONOMIC STABILITY: FOOD INSECURITY: WITHIN THE PAST 12 MONTHS, YOU WORRIED THAT YOUR FOOD WOULD RUN OUT BEFORE YOU GOT MONEY TO BUY MORE.: NEVER TRUE

## 2024-12-03 ASSESSMENT — ENCOUNTER SYMPTOMS
COUGH: 1
ALLERGIC/IMMUNOLOGIC NEGATIVE: 1
EYES NEGATIVE: 1
GASTROINTESTINAL NEGATIVE: 1

## 2024-12-03 NOTE — PROGRESS NOTES
SUBJECTIVE:    Patient ID: Sara Armstrong is a 59 y.o.female.    HPI:   Patient here for evaluation of upper respiratory infection  Patient is a 59-year-old female patient complaining of a 2-day history of cough congestion general malaise sore throat.  Headaches.  Nausea.  Just not feeling well.  She have history of recurrent pneumonias.         Past Medical History:   Diagnosis Date    Anxiety and depression 11/2/2021    Pereyra esophagus     Chest pressure 10/22/2017    10/14/2014  SE Positive for clinical myocardial ischemia 10/14/2017  Cath  Moderate diffuse disease in mid and distal LAD, normal LVFX     GERD (gastroesophageal reflux disease)     Hyperlipidemia 11/2/2021    Hypertension     Obesity     Reactive airway disease without complication 7/3/2024    Sleep apnea     sleeps with c pap      Current Outpatient Medications   Medication Sig Dispense Refill    amoxicillin-clavulanate (AUGMENTIN) 875-125 MG per tablet Take 1 tablet by mouth 2 times daily for 10 days 20 tablet 0    ipratropium 0.5 mg-albuterol 2.5 mg (DUONEB) 0.5-2.5 (3) MG/3ML SOLN nebulizer solution Take 3 mLs by nebulization every 6 hours as needed for Shortness of Breath or Wheezing 360 each 3    mometasone-formoterol (DULERA) 200-5 MCG/ACT inhaler Inhale 2 puffs into the lungs in the morning and at bedtime (Patient taking differently: Inhale 2 puffs into the lungs in the morning and at bedtime Doesn't take as prescribed) 3 each 3    baclofen (LIORESAL) 10 MG tablet Take 1 tablet by mouth 3 times daily (Patient taking differently: Take 1 tablet by mouth as needed) 270 tablet 3    ezetimibe (ZETIA) 10 MG tablet Take 1 tablet by mouth daily 90 tablet 3    escitalopram (LEXAPRO) 20 MG tablet Take 1 tablet by mouth daily 90 tablet 3    vitamin D (ERGOCALCIFEROL) 1.25 MG (35310 UT) CAPS capsule Take 1 capsule by mouth once a week 12 capsule 3    amLODIPine (NORVASC) 5 MG tablet Take 1 tablet by mouth daily 90 tablet 3    esomeprazole (NEXIUM) 40 MG

## 2024-12-04 LAB — SARS-COV-2 N GENE RESP QL NAA+PROBE: NOT DETECTED

## 2025-01-23 ENCOUNTER — PATIENT MESSAGE (OUTPATIENT)
Age: 60
End: 2025-01-23

## 2025-01-23 DIAGNOSIS — F41.9 ANXIETY AND DEPRESSION: ICD-10-CM

## 2025-01-23 DIAGNOSIS — E78.5 HYPERLIPIDEMIA, UNSPECIFIED HYPERLIPIDEMIA TYPE: ICD-10-CM

## 2025-01-23 DIAGNOSIS — F32.A ANXIETY AND DEPRESSION: ICD-10-CM

## 2025-01-23 DIAGNOSIS — J40 BRONCHITIS: ICD-10-CM

## 2025-01-23 DIAGNOSIS — G44.229 CHRONIC TENSION-TYPE HEADACHE, NOT INTRACTABLE: ICD-10-CM

## 2025-01-23 DIAGNOSIS — B37.2 SKIN YEAST INFECTION: ICD-10-CM

## 2025-01-23 RX ORDER — ONDANSETRON 4 MG/1
4 TABLET, FILM COATED ORAL EVERY 8 HOURS PRN
Qty: 30 TABLET | Refills: 0 | Status: SHIPPED | OUTPATIENT
Start: 2025-01-23

## 2025-01-23 RX ORDER — EZETIMIBE 10 MG/1
10 TABLET ORAL DAILY
Qty: 90 TABLET | Refills: 3 | Status: SHIPPED | OUTPATIENT
Start: 2025-01-23 | End: 2026-01-23

## 2025-01-23 RX ORDER — NYSTATIN 100000 [USP'U]/G
POWDER TOPICAL
Qty: 60 G | Refills: 1 | Status: SHIPPED | OUTPATIENT
Start: 2025-01-23

## 2025-01-23 RX ORDER — ESOMEPRAZOLE MAGNESIUM 40 MG/1
40 CAPSULE, DELAYED RELEASE ORAL
Qty: 90 CAPSULE | Refills: 3 | Status: SHIPPED | OUTPATIENT
Start: 2025-01-23 | End: 2026-01-23

## 2025-01-23 RX ORDER — ESCITALOPRAM OXALATE 20 MG/1
20 TABLET ORAL DAILY
Qty: 90 TABLET | Refills: 3 | Status: SHIPPED | OUTPATIENT
Start: 2025-01-23 | End: 2026-01-23

## 2025-01-23 RX ORDER — ERGOCALCIFEROL 1.25 MG/1
50000 CAPSULE, LIQUID FILLED ORAL WEEKLY
Qty: 12 CAPSULE | Refills: 3 | Status: SHIPPED | OUTPATIENT
Start: 2025-01-23

## 2025-01-23 RX ORDER — BUTALBITAL, ACETAMINOPHEN AND CAFFEINE 50; 325; 40 MG/1; MG/1; MG/1
1 TABLET ORAL EVERY 6 HOURS PRN
Qty: 60 TABLET | Refills: 0 | Status: SHIPPED | OUTPATIENT
Start: 2025-01-23

## 2025-01-27 ENCOUNTER — TELEPHONE (OUTPATIENT)
Age: 60
End: 2025-01-27

## 2025-01-27 DIAGNOSIS — J40 BRONCHITIS: Primary | ICD-10-CM

## 2025-01-27 RX ORDER — PREDNISONE 20 MG/1
20 TABLET ORAL 2 TIMES DAILY
Qty: 8 TABLET | Refills: 0 | Status: SHIPPED | OUTPATIENT
Start: 2025-01-27 | End: 2025-01-30 | Stop reason: SDUPTHER

## 2025-01-27 RX ORDER — LEVOFLOXACIN 500 MG/1
500 TABLET, FILM COATED ORAL DAILY
Qty: 7 TABLET | Refills: 0 | Status: SHIPPED | OUTPATIENT
Start: 2025-01-27 | End: 2025-02-03

## 2025-01-27 NOTE — TELEPHONE ENCOUNTER
Patient called saying she has cough and sore throat and headache. Patient started feeling bad on Saturday. Patient wants medication sent to [facility] in [place]. Patient tested neg for covis at home.

## 2025-01-27 NOTE — TELEPHONE ENCOUNTER
Levaquin 500 daily for 7 and prednisone 20 twice daily for 4 days prescription was sent to the pharmacy.

## 2025-01-30 ENCOUNTER — OFFICE VISIT (OUTPATIENT)
Age: 60
End: 2025-01-30
Payer: COMMERCIAL

## 2025-01-30 ENCOUNTER — HOSPITAL ENCOUNTER (OUTPATIENT)
Dept: GENERAL RADIOLOGY | Age: 60
Discharge: HOME | End: 2025-01-30
Payer: COMMERCIAL

## 2025-01-30 VITALS
HEIGHT: 65 IN | SYSTOLIC BLOOD PRESSURE: 136 MMHG | HEART RATE: 64 BPM | BODY MASS INDEX: 36.65 KG/M2 | OXYGEN SATURATION: 94 % | WEIGHT: 220 LBS | DIASTOLIC BLOOD PRESSURE: 88 MMHG | TEMPERATURE: 97.2 F

## 2025-01-30 DIAGNOSIS — J40 BRONCHITIS: ICD-10-CM

## 2025-01-30 PROCEDURE — 3075F SYST BP GE 130 - 139MM HG: CPT | Performed by: FAMILY MEDICINE

## 2025-01-30 PROCEDURE — 99213 OFFICE O/P EST LOW 20 MIN: CPT | Performed by: FAMILY MEDICINE

## 2025-01-30 PROCEDURE — 3079F DIAST BP 80-89 MM HG: CPT | Performed by: FAMILY MEDICINE

## 2025-01-30 PROCEDURE — 71046 X-RAY EXAM CHEST 2 VIEWS: CPT

## 2025-01-30 RX ORDER — IPRATROPIUM BROMIDE 42 UG/1
2 SPRAY, METERED NASAL 4 TIMES DAILY
Qty: 15 ML | Refills: 3 | Status: SHIPPED | OUTPATIENT
Start: 2025-01-30

## 2025-01-30 RX ORDER — METFORMIN HYDROCHLORIDE 500 MG/1
1000 TABLET, EXTENDED RELEASE ORAL 2 TIMES DAILY
COMMUNITY
Start: 2025-01-24

## 2025-01-30 RX ORDER — LISINOPRIL 5 MG/1
5 TABLET ORAL DAILY
COMMUNITY
Start: 2025-01-24

## 2025-01-30 RX ORDER — ESTRADIOL 0.5 MG/1
0.5 TABLET ORAL DAILY
COMMUNITY
Start: 2025-01-24

## 2025-01-30 RX ORDER — FLUTICASONE PROPIONATE 50 MCG
2 SPRAY, SUSPENSION (ML) NASAL DAILY PRN
Qty: 16 G | Refills: 3 | Status: SHIPPED | OUTPATIENT
Start: 2025-01-30

## 2025-01-30 RX ORDER — PREDNISONE 20 MG/1
20 TABLET ORAL 2 TIMES DAILY
Qty: 8 TABLET | Refills: 0 | Status: SHIPPED | OUTPATIENT
Start: 2025-01-30 | End: 2025-02-03

## 2025-01-30 SDOH — ECONOMIC STABILITY: FOOD INSECURITY: WITHIN THE PAST 12 MONTHS, THE FOOD YOU BOUGHT JUST DIDN'T LAST AND YOU DIDN'T HAVE MONEY TO GET MORE.: NEVER TRUE

## 2025-01-30 SDOH — ECONOMIC STABILITY: FOOD INSECURITY: WITHIN THE PAST 12 MONTHS, YOU WORRIED THAT YOUR FOOD WOULD RUN OUT BEFORE YOU GOT MONEY TO BUY MORE.: NEVER TRUE

## 2025-01-30 ASSESSMENT — ENCOUNTER SYMPTOMS
EYES NEGATIVE: 1
COUGH: 1
ALLERGIC/IMMUNOLOGIC NEGATIVE: 1
GASTROINTESTINAL NEGATIVE: 1
WHEEZING: 1

## 2025-01-30 NOTE — PROGRESS NOTES
Negative.    Respiratory:  Positive for cough and wheezing.    Cardiovascular: Negative.    Gastrointestinal: Negative.    Endocrine: Negative.    Genitourinary: Negative.    Musculoskeletal: Negative.    Skin: Negative.    Allergic/Immunologic: Negative.    Neurological: Negative.    Hematological: Negative.    Psychiatric/Behavioral: Negative.         OBJECTIVE:    Physical Exam  Constitutional:       Appearance: Normal appearance. She is well-developed and well-groomed.   HENT:      Head: Normocephalic and atraumatic.      Right Ear: Tympanic membrane, ear canal and external ear normal. There is no impacted cerumen.      Left Ear: Tympanic membrane, ear canal and external ear normal. There is no impacted cerumen.      Nose: Nose normal.      Mouth/Throat:      Lips: Pink.      Mouth: Mucous membranes are moist.      Dentition: Normal dentition.      Pharynx: Oropharynx is clear. Uvula midline.   Eyes:      General: Lids are normal.         Right eye: No discharge.         Left eye: No discharge.      Extraocular Movements: Extraocular movements intact.      Conjunctiva/sclera: Conjunctivae normal.      Right eye: Right conjunctiva is not injected.      Left eye: Left conjunctiva is not injected.      Pupils: Pupils are equal, round, and reactive to light.   Neck:      Thyroid: No thyromegaly.      Vascular: No carotid bruit or JVD.   Cardiovascular:      Rate and Rhythm: Normal rate and regular rhythm.      Pulses: Normal pulses.           Carotid pulses are 2+ on the right side and 2+ on the left side.       Radial pulses are 2+ on the right side and 2+ on the left side.      Heart sounds: Normal heart sounds, S1 normal and S2 normal. No murmur heard.  Pulmonary:      Effort: Pulmonary effort is normal.      Breath sounds: Normal breath sounds.   Abdominal:      General: Bowel sounds are normal. There is no distension or abdominal bruit.      Palpations: Abdomen is soft. There is no mass.      Hernia: No hernia

## 2025-02-04 ENCOUNTER — OFFICE VISIT (OUTPATIENT)
Dept: PULMONOLOGY | Age: 60
End: 2025-02-04
Payer: COMMERCIAL

## 2025-02-04 VITALS
WEIGHT: 226.4 LBS | RESPIRATION RATE: 20 BRPM | DIASTOLIC BLOOD PRESSURE: 74 MMHG | TEMPERATURE: 97.6 F | OXYGEN SATURATION: 95 % | BODY MASS INDEX: 37.72 KG/M2 | SYSTOLIC BLOOD PRESSURE: 129 MMHG | HEART RATE: 66 BPM | HEIGHT: 65 IN

## 2025-02-04 DIAGNOSIS — J45.20 MILD INTERMITTENT REACTIVE AIRWAY DISEASE WITHOUT COMPLICATION: ICD-10-CM

## 2025-02-04 DIAGNOSIS — K21.9 GASTROESOPHAGEAL REFLUX DISEASE WITHOUT ESOPHAGITIS: ICD-10-CM

## 2025-02-04 DIAGNOSIS — G47.33 SEVERE OBSTRUCTIVE SLEEP APNEA: Primary | ICD-10-CM

## 2025-02-04 DIAGNOSIS — G47.8 NON-RESTORATIVE SLEEP: ICD-10-CM

## 2025-02-04 PROCEDURE — 3078F DIAST BP <80 MM HG: CPT | Performed by: INTERNAL MEDICINE

## 2025-02-04 PROCEDURE — 99214 OFFICE O/P EST MOD 30 MIN: CPT | Performed by: INTERNAL MEDICINE

## 2025-02-04 PROCEDURE — 3074F SYST BP LT 130 MM HG: CPT | Performed by: INTERNAL MEDICINE

## 2025-02-04 ASSESSMENT — ENCOUNTER SYMPTOMS
ABDOMINAL DISTENTION: 0
RHINORRHEA: 0
ABDOMINAL PAIN: 0
WHEEZING: 1
ANAL BLEEDING: 0
SHORTNESS OF BREATH: 0
BACK PAIN: 0
APNEA: 0
CHEST TIGHTNESS: 0
COUGH: 0

## 2025-02-04 NOTE — PROGRESS NOTES
Pulmonary and Sleep Medicine    Sara Armstrong (:  1965) is a 60 y.o. female,Established patient, here for evaluation of the following chief complaint(s):  Follow-up (6 week follow up-/PFT-10/22/2024./S.S.-10/07/2024./Compliance report uploaded-2024. )      Referring physician:  No referring provider defined for this encounter.     ASSESSMENT/PLAN:  1. Severe obstructive sleep apnea. AHI 44 events per hour on HST done Sep 2024.  2. Mild intermittent reactive airway disease without complication  3. Gastroesophageal reflux disease without esophagitis  4. Non-restorative sleep        Continue current management with the CPAP she is compliant with the CPAP she feels the CPAP helps.  Continue current management with her inhalers.       Anastasiia Garcia MD, [facility], [facility]    Return in about 6 months (around 2025).    SUBJECTIVE/OBJECTIVE:        Patient is here for follow-up on obstructive sleep apnea and asthma.  Her asthma control is adequate.  Her allergic workup is negative.  She did have a sleep study that showed severe obstructive sleep apnea.  She is currently on auto CPAP.  Her CPAP compliance data was reviewed.  My interpretation of the download is that she is using the CPAP on average about 5 and half hours a night.  Her apnea-hypopnea index while on the CPAP is less than 5 events per hour.  She feels that the CPAP helps.  She is having difficulty with the headgear.          Continue the following medications as reported by the patient:    Prior to Visit Medications    Medication Sig Taking? Authorizing Provider   estradiol (ESTRACE) 0.5 MG tablet Take 1 tablet by mouth daily Yes ProviderTressa MD   lisinopril (PRINIVIL;ZESTRIL) 5 MG tablet Take 1 tablet by mouth daily Yes Tressa Bailon MD   metFORMIN (GLUCOPHAGE-XR) 500 MG extended release tablet Take 2 tablets by mouth in the morning and at bedtime 500mg in the morning an d 500 at night. One tablet in the AM and 1 tablet in

## 2025-03-21 LAB
BUN BLDV-MCNC: 18 MG/DL
CALCIUM SERPL-MCNC: 8.7 MG/DL
CHLORIDE BLD-SCNC: 106 MMOL/L
CO2: 29.3 MMOL/L
CREAT SERPL-MCNC: 0.91 MG/DL
EGFR: >=60
GLUCOSE BLD-MCNC: 95 MG/DL
POTASSIUM SERPL-SCNC: 4.4 MMOL/L
SODIUM BLD-SCNC: 141 MMOL/L

## 2025-05-12 ENCOUNTER — PATIENT MESSAGE (OUTPATIENT)
Age: 60
End: 2025-05-12

## 2025-05-12 DIAGNOSIS — M54.50 CHRONIC LOW BACK PAIN, UNSPECIFIED BACK PAIN LATERALITY, UNSPECIFIED WHETHER SCIATICA PRESENT: Primary | ICD-10-CM

## 2025-05-12 DIAGNOSIS — G89.29 CHRONIC LOW BACK PAIN, UNSPECIFIED BACK PAIN LATERALITY, UNSPECIFIED WHETHER SCIATICA PRESENT: Primary | ICD-10-CM

## 2025-05-13 RX ORDER — GABAPENTIN 100 MG/1
100 CAPSULE ORAL 3 TIMES DAILY
Qty: 270 CAPSULE | Refills: 1 | Status: SHIPPED | OUTPATIENT
Start: 2025-05-13 | End: 2025-11-09

## 2025-06-02 ENCOUNTER — PATIENT MESSAGE (OUTPATIENT)
Age: 60
End: 2025-06-02

## 2025-06-02 DIAGNOSIS — G44.229 CHRONIC TENSION-TYPE HEADACHE, NOT INTRACTABLE: ICD-10-CM

## 2025-06-02 NOTE — TELEPHONE ENCOUNTER
Patient is due to come into the office.  is currently out of the office, patient is going to schedule with provider when he returns to the office.

## 2025-06-03 RX ORDER — BUTALBITAL, ACETAMINOPHEN AND CAFFEINE 50; 325; 40 MG/1; MG/1; MG/1
1 TABLET ORAL EVERY 6 HOURS PRN
Qty: 60 TABLET | Refills: 0 | Status: SHIPPED | OUTPATIENT
Start: 2025-06-03

## 2025-06-19 ENCOUNTER — PATIENT MESSAGE (OUTPATIENT)
Age: 60
End: 2025-06-19

## 2025-06-19 DIAGNOSIS — Z12.31 ENCOUNTER FOR SCREENING MAMMOGRAM FOR MALIGNANT NEOPLASM OF BREAST: Primary | ICD-10-CM

## 2025-07-01 DIAGNOSIS — Z12.31 ENCOUNTER FOR SCREENING MAMMOGRAM FOR MALIGNANT NEOPLASM OF BREAST: ICD-10-CM

## (undated) DEVICE — CANNULA NSL AD L7FT DIV O2 CO2 W/ M LUERLOCK TRMPT CONN

## (undated) DEVICE — CLEANING SPONGE: Brand: KOALA™

## (undated) DEVICE — SINGLE PORT MANIFOLD: Brand: NEPTUNE 2

## (undated) DEVICE — COLON KIT WITH 1.1 OZ ORCA HYDRA SEAL 2 GOWN

## (undated) DEVICE — BITE BLOCK ENDOSCP AD 60 FR W/ ADJ STRP PLAS GRN BLOX

## (undated) DEVICE — FORCEPS BX 240CM 2.4MM L NDL RAD JAW 4 M00513334

## (undated) DEVICE — ADAPTER CLEANING PORPOISE CLEANING

## (undated) DEVICE — ENDO KIT,LOURDES HOSPITAL: Brand: MEDLINE INDUSTRIES, INC.

## (undated) DEVICE — BRUSH ENDOSCP 2 END CHN HEDGEHOG